# Patient Record
Sex: FEMALE | Race: OTHER | HISPANIC OR LATINO
[De-identification: names, ages, dates, MRNs, and addresses within clinical notes are randomized per-mention and may not be internally consistent; named-entity substitution may affect disease eponyms.]

---

## 2017-01-06 ENCOUNTER — APPOINTMENT (OUTPATIENT)
Dept: ORTHOPEDIC SURGERY | Facility: HOSPITAL | Age: 62
End: 2017-01-06

## 2017-01-23 ENCOUNTER — APPOINTMENT (OUTPATIENT)
Dept: ORTHOPEDIC SURGERY | Facility: CLINIC | Age: 62
End: 2017-01-23

## 2017-01-30 ENCOUNTER — LABORATORY RESULT (OUTPATIENT)
Age: 62
End: 2017-01-30

## 2017-01-30 ENCOUNTER — APPOINTMENT (OUTPATIENT)
Dept: RHEUMATOLOGY | Facility: CLINIC | Age: 62
End: 2017-01-30

## 2017-01-31 ENCOUNTER — APPOINTMENT (OUTPATIENT)
Dept: OPHTHALMOLOGY | Facility: CLINIC | Age: 62
End: 2017-01-31

## 2017-01-31 ENCOUNTER — OUTPATIENT (OUTPATIENT)
Dept: OUTPATIENT SERVICES | Facility: HOSPITAL | Age: 62
LOS: 1 days | End: 2017-01-31

## 2017-03-24 ENCOUNTER — APPOINTMENT (OUTPATIENT)
Dept: OPHTHALMOLOGY | Facility: CLINIC | Age: 62
End: 2017-03-24

## 2017-04-03 ENCOUNTER — APPOINTMENT (OUTPATIENT)
Dept: RHEUMATOLOGY | Facility: CLINIC | Age: 62
End: 2017-04-03

## 2017-04-06 ENCOUNTER — OUTPATIENT (OUTPATIENT)
Dept: OUTPATIENT SERVICES | Facility: HOSPITAL | Age: 62
LOS: 1 days | End: 2017-04-06

## 2017-04-07 ENCOUNTER — APPOINTMENT (OUTPATIENT)
Dept: OPHTHALMOLOGY | Facility: CLINIC | Age: 62
End: 2017-04-07

## 2017-04-13 ENCOUNTER — LABORATORY RESULT (OUTPATIENT)
Age: 62
End: 2017-04-13

## 2017-04-13 ENCOUNTER — APPOINTMENT (OUTPATIENT)
Dept: RHEUMATOLOGY | Facility: CLINIC | Age: 62
End: 2017-04-13

## 2017-05-08 ENCOUNTER — APPOINTMENT (OUTPATIENT)
Dept: OPHTHALMOLOGY | Facility: CLINIC | Age: 62
End: 2017-05-08

## 2017-05-18 ENCOUNTER — RX RENEWAL (OUTPATIENT)
Age: 62
End: 2017-05-18

## 2017-06-08 ENCOUNTER — APPOINTMENT (OUTPATIENT)
Dept: RHEUMATOLOGY | Facility: CLINIC | Age: 62
End: 2017-06-08

## 2017-06-08 ENCOUNTER — LABORATORY RESULT (OUTPATIENT)
Age: 62
End: 2017-06-08

## 2017-06-13 ENCOUNTER — LABORATORY RESULT (OUTPATIENT)
Age: 62
End: 2017-06-13

## 2017-07-18 DIAGNOSIS — H04.123 DRY EYE SYNDROME OF BILATERAL LACRIMAL GLANDS: ICD-10-CM

## 2017-08-03 ENCOUNTER — APPOINTMENT (OUTPATIENT)
Dept: RHEUMATOLOGY | Facility: CLINIC | Age: 62
End: 2017-08-03
Payer: MEDICAID

## 2017-08-03 PROCEDURE — 96415 CHEMO IV INFUSION ADDL HR: CPT

## 2017-08-03 PROCEDURE — 96375 TX/PRO/DX INJ NEW DRUG ADDON: CPT

## 2017-08-03 PROCEDURE — 96413 CHEMO IV INFUSION 1 HR: CPT

## 2017-08-22 ENCOUNTER — OUTPATIENT (OUTPATIENT)
Dept: OUTPATIENT SERVICES | Facility: HOSPITAL | Age: 62
LOS: 1 days | End: 2017-08-22
Payer: MEDICAID

## 2017-08-22 ENCOUNTER — APPOINTMENT (OUTPATIENT)
Dept: MAMMOGRAPHY | Facility: IMAGING CENTER | Age: 62
End: 2017-08-22
Payer: MEDICAID

## 2017-08-22 ENCOUNTER — APPOINTMENT (OUTPATIENT)
Dept: RADIOLOGY | Facility: IMAGING CENTER | Age: 62
End: 2017-08-22
Payer: MEDICAID

## 2017-08-22 DIAGNOSIS — Z00.8 ENCOUNTER FOR OTHER GENERAL EXAMINATION: ICD-10-CM

## 2017-08-22 PROCEDURE — 77063 BREAST TOMOSYNTHESIS BI: CPT | Mod: 26

## 2017-08-22 PROCEDURE — 77063 BREAST TOMOSYNTHESIS BI: CPT

## 2017-08-22 PROCEDURE — 77080 DXA BONE DENSITY AXIAL: CPT | Mod: 26

## 2017-08-22 PROCEDURE — G0202: CPT | Mod: 26

## 2017-08-22 PROCEDURE — 77067 SCR MAMMO BI INCL CAD: CPT

## 2017-08-22 PROCEDURE — 77080 DXA BONE DENSITY AXIAL: CPT

## 2017-09-20 ENCOUNTER — EMERGENCY (EMERGENCY)
Facility: HOSPITAL | Age: 62
LOS: 1 days | Discharge: HOME | End: 2017-09-20
Attending: EMERGENCY MEDICINE | Admitting: EMERGENCY MEDICINE
Payer: MEDICAID

## 2017-09-20 VITALS
OXYGEN SATURATION: 96 % | SYSTOLIC BLOOD PRESSURE: 131 MMHG | DIASTOLIC BLOOD PRESSURE: 87 MMHG | HEART RATE: 100 BPM | RESPIRATION RATE: 18 BRPM | WEIGHT: 151.9 LBS | TEMPERATURE: 99 F

## 2017-09-20 VITALS
TEMPERATURE: 98 F | HEART RATE: 98 BPM | DIASTOLIC BLOOD PRESSURE: 75 MMHG | RESPIRATION RATE: 18 BRPM | OXYGEN SATURATION: 98 % | SYSTOLIC BLOOD PRESSURE: 112 MMHG

## 2017-09-20 LAB
ALBUMIN SERPL ELPH-MCNC: 4.4 G/DL — SIGNIFICANT CHANGE UP (ref 3.3–5)
ALP SERPL-CCNC: 78 U/L — SIGNIFICANT CHANGE UP (ref 40–120)
ALT FLD-CCNC: 12 U/L RC — SIGNIFICANT CHANGE UP (ref 10–45)
ANION GAP SERPL CALC-SCNC: 15 MMOL/L — SIGNIFICANT CHANGE UP (ref 5–17)
APTT BLD: 28.7 SEC — SIGNIFICANT CHANGE UP (ref 27.5–37.4)
AST SERPL-CCNC: 18 U/L — SIGNIFICANT CHANGE UP (ref 10–40)
BASOPHILS # BLD AUTO: 0.1 K/UL — SIGNIFICANT CHANGE UP (ref 0–0.2)
BASOPHILS NFR BLD AUTO: 0.7 % — SIGNIFICANT CHANGE UP (ref 0–2)
BILIRUB SERPL-MCNC: 0.3 MG/DL — SIGNIFICANT CHANGE UP (ref 0.2–1.2)
BUN SERPL-MCNC: 14 MG/DL — SIGNIFICANT CHANGE UP (ref 7–23)
CALCIUM SERPL-MCNC: 10.2 MG/DL — SIGNIFICANT CHANGE UP (ref 8.4–10.5)
CHLORIDE SERPL-SCNC: 100 MMOL/L — SIGNIFICANT CHANGE UP (ref 96–108)
CO2 SERPL-SCNC: 26 MMOL/L — SIGNIFICANT CHANGE UP (ref 22–31)
CREAT SERPL-MCNC: 0.81 MG/DL — SIGNIFICANT CHANGE UP (ref 0.5–1.3)
EOSINOPHIL # BLD AUTO: 0.2 K/UL — SIGNIFICANT CHANGE UP (ref 0–0.5)
EOSINOPHIL NFR BLD AUTO: 3.3 % — SIGNIFICANT CHANGE UP (ref 0–6)
GAS PNL BLDV: SIGNIFICANT CHANGE UP
GLUCOSE SERPL-MCNC: 87 MG/DL — SIGNIFICANT CHANGE UP (ref 70–99)
HCT VFR BLD CALC: 42.3 % — SIGNIFICANT CHANGE UP (ref 34.5–45)
HGB BLD-MCNC: 14.2 G/DL — SIGNIFICANT CHANGE UP (ref 11.5–15.5)
INR BLD: 1.05 RATIO — SIGNIFICANT CHANGE UP (ref 0.88–1.16)
LYMPHOCYTES # BLD AUTO: 2.3 K/UL — SIGNIFICANT CHANGE UP (ref 1–3.3)
LYMPHOCYTES # BLD AUTO: 31.2 % — SIGNIFICANT CHANGE UP (ref 13–44)
MCHC RBC-ENTMCNC: 30.3 PG — SIGNIFICANT CHANGE UP (ref 27–34)
MCHC RBC-ENTMCNC: 33.5 GM/DL — SIGNIFICANT CHANGE UP (ref 32–36)
MCV RBC AUTO: 90.3 FL — SIGNIFICANT CHANGE UP (ref 80–100)
MONOCYTES # BLD AUTO: 0.4 K/UL — SIGNIFICANT CHANGE UP (ref 0–0.9)
MONOCYTES NFR BLD AUTO: 5.5 % — SIGNIFICANT CHANGE UP (ref 2–14)
NEUTROPHILS # BLD AUTO: 4.4 K/UL — SIGNIFICANT CHANGE UP (ref 1.8–7.4)
NEUTROPHILS NFR BLD AUTO: 59.2 % — SIGNIFICANT CHANGE UP (ref 43–77)
PLATELET # BLD AUTO: 245 K/UL — SIGNIFICANT CHANGE UP (ref 150–400)
POTASSIUM SERPL-MCNC: 3.8 MMOL/L — SIGNIFICANT CHANGE UP (ref 3.5–5.3)
POTASSIUM SERPL-SCNC: 3.8 MMOL/L — SIGNIFICANT CHANGE UP (ref 3.5–5.3)
PROT SERPL-MCNC: 8.7 G/DL — HIGH (ref 6–8.3)
PROTHROM AB SERPL-ACNC: 11.5 SEC — SIGNIFICANT CHANGE UP (ref 9.8–12.7)
RAPID RVP RESULT: SIGNIFICANT CHANGE UP
RBC # BLD: 4.69 M/UL — SIGNIFICANT CHANGE UP (ref 3.8–5.2)
RBC # FLD: 11.4 % — SIGNIFICANT CHANGE UP (ref 10.3–14.5)
SODIUM SERPL-SCNC: 141 MMOL/L — SIGNIFICANT CHANGE UP (ref 135–145)
WBC # BLD: 7.5 K/UL — SIGNIFICANT CHANGE UP (ref 3.8–10.5)
WBC # FLD AUTO: 7.5 K/UL — SIGNIFICANT CHANGE UP (ref 3.8–10.5)

## 2017-09-20 PROCEDURE — 82435 ASSAY OF BLOOD CHLORIDE: CPT

## 2017-09-20 PROCEDURE — 87633 RESP VIRUS 12-25 TARGETS: CPT

## 2017-09-20 PROCEDURE — 84295 ASSAY OF SERUM SODIUM: CPT

## 2017-09-20 PROCEDURE — 87798 DETECT AGENT NOS DNA AMP: CPT

## 2017-09-20 PROCEDURE — 99284 EMERGENCY DEPT VISIT MOD MDM: CPT | Mod: 25

## 2017-09-20 PROCEDURE — 87486 CHLMYD PNEUM DNA AMP PROBE: CPT

## 2017-09-20 PROCEDURE — 87581 M.PNEUMON DNA AMP PROBE: CPT

## 2017-09-20 PROCEDURE — 85027 COMPLETE CBC AUTOMATED: CPT

## 2017-09-20 PROCEDURE — 71046 X-RAY EXAM CHEST 2 VIEWS: CPT

## 2017-09-20 PROCEDURE — 83605 ASSAY OF LACTIC ACID: CPT

## 2017-09-20 PROCEDURE — 85610 PROTHROMBIN TIME: CPT

## 2017-09-20 PROCEDURE — 80053 COMPREHEN METABOLIC PANEL: CPT

## 2017-09-20 PROCEDURE — 71020: CPT | Mod: 26

## 2017-09-20 PROCEDURE — 82330 ASSAY OF CALCIUM: CPT

## 2017-09-20 PROCEDURE — 82947 ASSAY GLUCOSE BLOOD QUANT: CPT

## 2017-09-20 PROCEDURE — 99284 EMERGENCY DEPT VISIT MOD MDM: CPT

## 2017-09-20 PROCEDURE — 84132 ASSAY OF SERUM POTASSIUM: CPT

## 2017-09-20 PROCEDURE — 85730 THROMBOPLASTIN TIME PARTIAL: CPT

## 2017-09-20 PROCEDURE — 82803 BLOOD GASES ANY COMBINATION: CPT

## 2017-09-20 PROCEDURE — 85014 HEMATOCRIT: CPT

## 2017-09-20 RX ORDER — AZITHROMYCIN 500 MG/1
1 TABLET, FILM COATED ORAL
Qty: 1 | Refills: 0 | OUTPATIENT
Start: 2017-09-20

## 2017-09-20 RX ORDER — SODIUM CHLORIDE 9 MG/ML
1000 INJECTION INTRAMUSCULAR; INTRAVENOUS; SUBCUTANEOUS ONCE
Qty: 0 | Refills: 0 | Status: COMPLETED | OUTPATIENT
Start: 2017-09-20 | End: 2017-09-20

## 2017-09-20 RX ORDER — AZITHROMYCIN 500 MG/1
500 TABLET, FILM COATED ORAL ONCE
Qty: 0 | Refills: 0 | Status: COMPLETED | OUTPATIENT
Start: 2017-09-20 | End: 2017-09-20

## 2017-09-20 RX ADMIN — SODIUM CHLORIDE 1000 MILLILITER(S): 9 INJECTION INTRAMUSCULAR; INTRAVENOUS; SUBCUTANEOUS at 17:29

## 2017-09-20 RX ADMIN — AZITHROMYCIN 500 MILLIGRAM(S): 500 TABLET, FILM COATED ORAL at 19:31

## 2017-09-20 NOTE — ED PROVIDER NOTE - MEDICAL DECISION MAKING DETAILS
61 y/o F pmhx RA on immunosuppression therapy presenting with worsening cough x1mo with mucous production now subjective fevers, chills and myalgias. Sent in by PMD for infectious work-up. PE unremarkable. Will obtain labs, cxr, rvp and reassess. 63 y/o F pmhx RA on immunosuppression therapy presenting with worsening cough x1mo with mucous production now subjective fevers, chills and myalgias. Sent in by PMD for infectious work-up. PE unremarkable. Will obtain labs, cxr, rvp and IVF,  reassess.

## 2017-09-20 NOTE — ED ADULT NURSE NOTE - OBJECTIVE STATEMENT
63 y/o female PMH RA on methotrexate, prednisone, remicade presents to ED c/o cough x 1 month, worsening since Friday. Pt had fever and chills since Friday as well (did not take temp but felt hot). Pt c/o headache and left sided chest discomfort that radiates to back when she coughs. Pt saw primary care today who told her to come to ED for labs and chest x ray. Pt states she has green phlegm when she coughs. Denies n/v/d. Pt lungs clear b/l. Breathing even, unlabored. Skin warm, dry, intact. Gross motor and neuro intact. Ambulatory without assistance. Pt safety and comfort provided. 63 y/o female PMH RA on methotrexate, prednisone, remicade presents to ED c/o cough x 1 month, worsening since Friday. Pt had fever and chills since Friday as well (did not take temp but felt hot). Pt c/o headache and left sided chest discomfort that radiates to back when she coughs. Pt saw primary care today who told her to come to ED for labs and chest x ray. Pt states she has green phlegm when she coughs. Pt coughs upon deep inspiration and c/o MCGINNIS. Denies n/v/d. Pt lungs clear b/l. Breathing even. Skin warm, dry, intact. Gross motor and neuro intact. Ambulatory without assistance. Pt safety and comfort provided.

## 2017-09-20 NOTE — ED PROVIDER NOTE - ATTENDING CONTRIBUTION TO CARE
63 y/o F pmhx RA on immunosuppression therapy presenting with worsening cough x1mo with mucous production now subjective fevers, chills and myalgias. Sent in by PMD for infectious work-up. Nontoxic on exam, PE unremarkable. Will obtain labs, cxr, rvp and IVF,  reassess.

## 2017-09-20 NOTE — ED PROVIDER NOTE - PROGRESS NOTE DETAILS
xray negative for pneumonia. given patient's immunocompromised status will rx zpak and d/c with instructions on importance of follow-up. -Eli Stoner PA-C

## 2017-09-20 NOTE — ED PROVIDER NOTE - OBJECTIVE STATEMENT
63 y/o F pmhx RA on infliximab, methotrexate and prednisone, presenting to the ED for cough worsening for the last month. Pt states that her cough has been getting progressively more severe, now with mucous production for the last week. She states that she has been having intermittent chills for the last 2 days and last night felt very warm. Did not check her temperature. Reports that she went to her PMD this morning as she has been having body aches along with these chills/feeling warm and was concerned. She reports that her PMD sent her in because he is concerned that she is immunocompromised secondary to her RA and the associated treatments. She states she has some shortness of breath associated with her cough, but denies any chest pain, nausea, vomiting, recent travel.

## 2017-09-20 NOTE — ED PROVIDER NOTE - CARE PLAN
Principal Discharge DX:	Bronchitis  Secondary Diagnosis:	Rheumatoid arthritis Principal Discharge DX:	Bronchitis  Instructions for follow-up, activity and diet:	1. Take Z-arianna as directed to completion   2. Take over the counter cough medicine as needed for cough   3. Follow-up with your primary physician this week for reevaluation   4. Return to the ER for any new or worsening symptoms.  Secondary Diagnosis:	Rheumatoid arthritis

## 2017-09-20 NOTE — ED PROVIDER NOTE - PLAN OF CARE
1. Take Z-arianna as directed to completion   2. Take over the counter cough medicine as needed for cough   3. Follow-up with your primary physician this week for reevaluation   4. Return to the ER for any new or worsening symptoms.

## 2017-09-25 ENCOUNTER — APPOINTMENT (OUTPATIENT)
Dept: RHEUMATOLOGY | Facility: CLINIC | Age: 62
End: 2017-09-25

## 2017-09-26 ENCOUNTER — APPOINTMENT (OUTPATIENT)
Dept: RADIOLOGY | Facility: CLINIC | Age: 62
End: 2017-09-26
Payer: MEDICAID

## 2017-09-26 ENCOUNTER — OUTPATIENT (OUTPATIENT)
Dept: OUTPATIENT SERVICES | Facility: HOSPITAL | Age: 62
LOS: 1 days | End: 2017-09-26
Payer: MEDICAID

## 2017-09-26 DIAGNOSIS — H04.123 DRY EYE SYNDROME OF BILATERAL LACRIMAL GLANDS: ICD-10-CM

## 2017-09-26 DIAGNOSIS — M25.561 PAIN IN RIGHT KNEE: ICD-10-CM

## 2017-09-26 PROCEDURE — 73522 X-RAY EXAM HIPS BI 3-4 VIEWS: CPT | Mod: 26

## 2017-09-26 PROCEDURE — 73564 X-RAY EXAM KNEE 4 OR MORE: CPT

## 2017-09-26 PROCEDURE — 73564 X-RAY EXAM KNEE 4 OR MORE: CPT | Mod: 26,50

## 2017-09-26 PROCEDURE — 73522 X-RAY EXAM HIPS BI 3-4 VIEWS: CPT

## 2017-10-02 ENCOUNTER — APPOINTMENT (OUTPATIENT)
Dept: RHEUMATOLOGY | Facility: CLINIC | Age: 62
End: 2017-10-02
Payer: MEDICAID

## 2017-10-02 VITALS
SYSTOLIC BLOOD PRESSURE: 132 MMHG | HEART RATE: 73 BPM | BODY MASS INDEX: 27.19 KG/M2 | WEIGHT: 144 LBS | DIASTOLIC BLOOD PRESSURE: 84 MMHG | OXYGEN SATURATION: 98 % | TEMPERATURE: 98.2 F | HEIGHT: 61 IN

## 2017-10-02 DIAGNOSIS — Z79.899 OTHER LONG TERM (CURRENT) DRUG THERAPY: ICD-10-CM

## 2017-10-02 PROCEDURE — 99214 OFFICE O/P EST MOD 30 MIN: CPT

## 2017-10-06 ENCOUNTER — LABORATORY RESULT (OUTPATIENT)
Age: 62
End: 2017-10-06

## 2017-10-06 ENCOUNTER — APPOINTMENT (OUTPATIENT)
Dept: RHEUMATOLOGY | Facility: CLINIC | Age: 62
End: 2017-10-06
Payer: MEDICAID

## 2017-10-06 PROCEDURE — 96413 CHEMO IV INFUSION 1 HR: CPT

## 2017-10-06 PROCEDURE — 96415 CHEMO IV INFUSION ADDL HR: CPT

## 2017-10-06 PROCEDURE — 36415 COLL VENOUS BLD VENIPUNCTURE: CPT

## 2017-12-01 ENCOUNTER — LABORATORY RESULT (OUTPATIENT)
Age: 62
End: 2017-12-01

## 2017-12-01 ENCOUNTER — APPOINTMENT (OUTPATIENT)
Dept: RHEUMATOLOGY | Facility: CLINIC | Age: 62
End: 2017-12-01
Payer: MEDICAID

## 2017-12-01 PROCEDURE — 96415 CHEMO IV INFUSION ADDL HR: CPT

## 2017-12-01 PROCEDURE — 96413 CHEMO IV INFUSION 1 HR: CPT

## 2017-12-01 PROCEDURE — 96375 TX/PRO/DX INJ NEW DRUG ADDON: CPT

## 2018-01-17 ENCOUNTER — APPOINTMENT (OUTPATIENT)
Dept: RHEUMATOLOGY | Facility: CLINIC | Age: 63
End: 2018-01-17

## 2018-01-24 ENCOUNTER — LABORATORY RESULT (OUTPATIENT)
Age: 63
End: 2018-01-24

## 2018-01-24 ENCOUNTER — APPOINTMENT (OUTPATIENT)
Dept: RHEUMATOLOGY | Facility: CLINIC | Age: 63
End: 2018-01-24
Payer: MEDICAID

## 2018-01-24 PROCEDURE — 96415 CHEMO IV INFUSION ADDL HR: CPT

## 2018-01-24 PROCEDURE — 96375 TX/PRO/DX INJ NEW DRUG ADDON: CPT

## 2018-01-24 PROCEDURE — 96413 CHEMO IV INFUSION 1 HR: CPT

## 2018-01-24 PROCEDURE — 36415 COLL VENOUS BLD VENIPUNCTURE: CPT

## 2018-03-01 ENCOUNTER — APPOINTMENT (OUTPATIENT)
Dept: RHEUMATOLOGY | Facility: CLINIC | Age: 63
End: 2018-03-01
Payer: MEDICAID

## 2018-03-01 ENCOUNTER — LABORATORY RESULT (OUTPATIENT)
Age: 63
End: 2018-03-01

## 2018-03-01 PROCEDURE — 36415 COLL VENOUS BLD VENIPUNCTURE: CPT

## 2018-03-01 PROCEDURE — 96413 CHEMO IV INFUSION 1 HR: CPT

## 2018-03-28 ENCOUNTER — LABORATORY RESULT (OUTPATIENT)
Age: 63
End: 2018-03-28

## 2018-03-28 ENCOUNTER — APPOINTMENT (OUTPATIENT)
Dept: RADIOLOGY | Facility: CLINIC | Age: 63
End: 2018-03-28
Payer: MEDICAID

## 2018-03-28 ENCOUNTER — OUTPATIENT (OUTPATIENT)
Dept: OUTPATIENT SERVICES | Facility: HOSPITAL | Age: 63
LOS: 1 days | End: 2018-03-28
Payer: MEDICAID

## 2018-03-28 ENCOUNTER — APPOINTMENT (OUTPATIENT)
Dept: RHEUMATOLOGY | Facility: CLINIC | Age: 63
End: 2018-03-28
Payer: MEDICAID

## 2018-03-28 DIAGNOSIS — M06.9 RHEUMATOID ARTHRITIS, UNSPECIFIED: ICD-10-CM

## 2018-03-28 PROCEDURE — 71046 X-RAY EXAM CHEST 2 VIEWS: CPT | Mod: 26

## 2018-03-28 PROCEDURE — 71046 X-RAY EXAM CHEST 2 VIEWS: CPT

## 2018-03-28 PROCEDURE — 36415 COLL VENOUS BLD VENIPUNCTURE: CPT

## 2018-03-28 PROCEDURE — 96413 CHEMO IV INFUSION 1 HR: CPT

## 2018-05-23 ENCOUNTER — APPOINTMENT (OUTPATIENT)
Dept: RHEUMATOLOGY | Facility: CLINIC | Age: 63
End: 2018-05-23

## 2018-06-01 ENCOUNTER — OUTPATIENT (OUTPATIENT)
Dept: OUTPATIENT SERVICES | Facility: HOSPITAL | Age: 63
LOS: 1 days | End: 2018-06-01
Payer: MEDICAID

## 2018-06-01 PROCEDURE — G9001: CPT

## 2018-06-07 DIAGNOSIS — R69 ILLNESS, UNSPECIFIED: ICD-10-CM

## 2018-06-12 ENCOUNTER — FORM ENCOUNTER (OUTPATIENT)
Age: 63
End: 2018-06-12

## 2018-06-13 ENCOUNTER — APPOINTMENT (OUTPATIENT)
Dept: RADIOLOGY | Facility: CLINIC | Age: 63
End: 2018-06-13
Payer: MEDICAID

## 2018-06-13 ENCOUNTER — APPOINTMENT (OUTPATIENT)
Dept: RHEUMATOLOGY | Facility: CLINIC | Age: 63
End: 2018-06-13
Payer: MEDICAID

## 2018-06-13 ENCOUNTER — OUTPATIENT (OUTPATIENT)
Dept: OUTPATIENT SERVICES | Facility: HOSPITAL | Age: 63
LOS: 1 days | End: 2018-06-13
Payer: MEDICAID

## 2018-06-13 DIAGNOSIS — Z00.8 ENCOUNTER FOR OTHER GENERAL EXAMINATION: ICD-10-CM

## 2018-06-13 PROCEDURE — 71046 X-RAY EXAM CHEST 2 VIEWS: CPT | Mod: 26

## 2018-06-13 PROCEDURE — 73110 X-RAY EXAM OF WRIST: CPT

## 2018-06-13 PROCEDURE — 73610 X-RAY EXAM OF ANKLE: CPT | Mod: 26,LT,RT

## 2018-06-13 PROCEDURE — 71046 X-RAY EXAM CHEST 2 VIEWS: CPT

## 2018-06-13 PROCEDURE — 73630 X-RAY EXAM OF FOOT: CPT

## 2018-06-13 PROCEDURE — 73110 X-RAY EXAM OF WRIST: CPT | Mod: 26,LT,RT

## 2018-06-13 PROCEDURE — 73630 X-RAY EXAM OF FOOT: CPT | Mod: 26,LT,RT

## 2018-06-13 PROCEDURE — 99215 OFFICE O/P EST HI 40 MIN: CPT | Mod: 25

## 2018-06-13 PROCEDURE — 73130 X-RAY EXAM OF HAND: CPT | Mod: 26,LT,RT

## 2018-06-13 PROCEDURE — 73610 X-RAY EXAM OF ANKLE: CPT

## 2018-06-13 PROCEDURE — 73130 X-RAY EXAM OF HAND: CPT

## 2018-06-13 PROCEDURE — 96372 THER/PROPH/DIAG INJ SC/IM: CPT

## 2018-06-13 RX ORDER — METHYLPRED ACET/NACL,ISO-OS/PF 40 MG/ML
40 VIAL (ML) INJECTION
Qty: 1 | Refills: 0 | Status: COMPLETED | OUTPATIENT
Start: 2018-06-13

## 2018-06-13 RX ORDER — AMPICILLIN TRIHYDRATE 500 MG
25 MCG CAPSULE ORAL DAILY
Qty: 90 | Refills: 3 | Status: DISCONTINUED | COMMUNITY
Start: 2017-05-18 | End: 2018-06-13

## 2018-06-13 RX ORDER — PRAVASTATIN SODIUM 20 MG/1
20 TABLET ORAL
Qty: 30 | Refills: 0 | Status: COMPLETED | COMMUNITY
Start: 2018-02-27

## 2018-06-13 RX ORDER — CELECOXIB 200 MG/1
200 CAPSULE ORAL
Qty: 30 | Refills: 0 | Status: COMPLETED | COMMUNITY
Start: 2018-03-15

## 2018-06-13 RX ADMIN — METHYLPREDNISOLONE ACETATE 0 MG/ML: 40 INJECTION, SUSPENSION INTRA-ARTICULAR; INTRALESIONAL; INTRAMUSCULAR; SOFT TISSUE at 00:00

## 2018-06-18 LAB
25(OH)D3 SERPL-MCNC: 45.9 NG/ML
ADJUSTED MITOGEN: 0.76 IU/ML
ADJUSTED TB AG: 0.02 IU/ML
ALBUMIN SERPL ELPH-MCNC: 4 G/DL
ALP BLD-CCNC: 67 U/L
ALT SERPL-CCNC: 10 U/L
ANION GAP SERPL CALC-SCNC: 17 MMOL/L
AST SERPL-CCNC: 15 U/L
BASOPHILS # BLD AUTO: 0.03 K/UL
BASOPHILS NFR BLD AUTO: 0.4 %
BILIRUB SERPL-MCNC: 0.3 MG/DL
BUN SERPL-MCNC: 13 MG/DL
CALCIUM SERPL-MCNC: 9.7 MG/DL
CHLORIDE SERPL-SCNC: 101 MMOL/L
CO2 SERPL-SCNC: 24 MMOL/L
CREAT SERPL-MCNC: 0.71 MG/DL
CRP SERPL-MCNC: 1.3 MG/DL
EOSINOPHIL # BLD AUTO: 0.22 K/UL
EOSINOPHIL NFR BLD AUTO: 2.8 %
ERYTHROCYTE [SEDIMENTATION RATE] IN BLOOD BY WESTERGREN METHOD: 63 MM/HR
GLUCOSE SERPL-MCNC: 96 MG/DL
HAV IGM SER QL: NONREACTIVE
HBV CORE IGG+IGM SER QL: NONREACTIVE
HBV CORE IGM SER QL: NONREACTIVE
HBV SURFACE AG SER QL: NONREACTIVE
HCT VFR BLD CALC: 39.7 %
HCV AB SER QL: NONREACTIVE
HCV S/CO RATIO: 0.18 S/CO
HGB BLD-MCNC: 13.3 G/DL
IMM GRANULOCYTES NFR BLD AUTO: 0.3 %
LYMPHOCYTES # BLD AUTO: 1.85 K/UL
LYMPHOCYTES NFR BLD AUTO: 23.5 %
M TB IFN-G BLD-IMP: NEGATIVE
MAN DIFF?: NORMAL
MCHC RBC-ENTMCNC: 28.7 PG
MCHC RBC-ENTMCNC: 33.5 GM/DL
MCV RBC AUTO: 85.6 FL
MONOCYTES # BLD AUTO: 0.62 K/UL
MONOCYTES NFR BLD AUTO: 7.9 %
NEUTROPHILS # BLD AUTO: 5.13 K/UL
NEUTROPHILS NFR BLD AUTO: 65.1 %
PLATELET # BLD AUTO: 281 K/UL
POTASSIUM SERPL-SCNC: 4.1 MMOL/L
PROT SERPL-MCNC: 7.9 G/DL
QUANTIFERON GOLD NIL: 0.01 IU/ML
RBC # BLD: 4.64 M/UL
RBC # FLD: 13.2 %
SODIUM SERPL-SCNC: 142 MMOL/L
WBC # FLD AUTO: 7.87 K/UL

## 2018-07-01 ENCOUNTER — OUTPATIENT (OUTPATIENT)
Dept: OUTPATIENT SERVICES | Facility: HOSPITAL | Age: 63
LOS: 1 days | End: 2018-07-01

## 2018-07-06 ENCOUNTER — CLINICAL ADVICE (OUTPATIENT)
Age: 63
End: 2018-07-06

## 2018-07-18 DIAGNOSIS — Z71.89 OTHER SPECIFIED COUNSELING: ICD-10-CM

## 2018-07-27 ENCOUNTER — APPOINTMENT (OUTPATIENT)
Dept: RHEUMATOLOGY | Facility: CLINIC | Age: 63
End: 2018-07-27
Payer: MEDICAID

## 2018-07-27 VITALS
BODY MASS INDEX: 25.49 KG/M2 | WEIGHT: 135 LBS | TEMPERATURE: 98 F | OXYGEN SATURATION: 98 % | RESPIRATION RATE: 16 BRPM | HEIGHT: 61 IN | DIASTOLIC BLOOD PRESSURE: 76 MMHG | HEART RATE: 70 BPM | SYSTOLIC BLOOD PRESSURE: 107 MMHG

## 2018-07-27 PROCEDURE — 99214 OFFICE O/P EST MOD 30 MIN: CPT

## 2018-08-05 ENCOUNTER — FORM ENCOUNTER (OUTPATIENT)
Age: 63
End: 2018-08-05

## 2018-08-06 ENCOUNTER — OUTPATIENT (OUTPATIENT)
Dept: OUTPATIENT SERVICES | Facility: HOSPITAL | Age: 63
LOS: 1 days | End: 2018-08-06
Payer: MEDICAID

## 2018-08-06 ENCOUNTER — APPOINTMENT (OUTPATIENT)
Dept: ULTRASOUND IMAGING | Facility: IMAGING CENTER | Age: 63
End: 2018-08-06
Payer: MEDICAID

## 2018-08-06 DIAGNOSIS — M85.80 OTHER SPECIFIED DISORDERS OF BONE DENSITY AND STRUCTURE, UNSPECIFIED SITE: ICD-10-CM

## 2018-08-06 DIAGNOSIS — M06.30 RHEUMATOID NODULE, UNSPECIFIED SITE: ICD-10-CM

## 2018-08-06 DIAGNOSIS — M06.9 RHEUMATOID ARTHRITIS, UNSPECIFIED: ICD-10-CM

## 2018-08-06 DIAGNOSIS — R13.10 DYSPHAGIA, UNSPECIFIED: ICD-10-CM

## 2018-08-06 LAB
ALBUMIN SERPL ELPH-MCNC: 4 G/DL
ALP BLD-CCNC: 62 U/L
ALT SERPL-CCNC: 7 U/L
ANION GAP SERPL CALC-SCNC: 16 MMOL/L
AST SERPL-CCNC: 13 U/L
BASOPHILS # BLD AUTO: 0.03 K/UL
BASOPHILS NFR BLD AUTO: 0.4 %
BILIRUB SERPL-MCNC: 0.2 MG/DL
BUN SERPL-MCNC: 12 MG/DL
CALCIUM SERPL-MCNC: 9.3 MG/DL
CHLORIDE SERPL-SCNC: 103 MMOL/L
CO2 SERPL-SCNC: 24 MMOL/L
CREAT SERPL-MCNC: 0.76 MG/DL
EOSINOPHIL # BLD AUTO: 0.27 K/UL
EOSINOPHIL NFR BLD AUTO: 3.3 %
ERYTHROCYTE [SEDIMENTATION RATE] IN BLOOD BY WESTERGREN METHOD: 44 MM/HR
GLUCOSE SERPL-MCNC: 106 MG/DL
HCT VFR BLD CALC: 39 %
HGB BLD-MCNC: 12.6 G/DL
IMM GRANULOCYTES NFR BLD AUTO: 0.2 %
LYMPHOCYTES # BLD AUTO: 2.37 K/UL
LYMPHOCYTES NFR BLD AUTO: 28.8 %
MAN DIFF?: NORMAL
MCHC RBC-ENTMCNC: 28.4 PG
MCHC RBC-ENTMCNC: 32.3 GM/DL
MCV RBC AUTO: 87.8 FL
MONOCYTES # BLD AUTO: 0.71 K/UL
MONOCYTES NFR BLD AUTO: 8.6 %
NEUTROPHILS # BLD AUTO: 4.82 K/UL
NEUTROPHILS NFR BLD AUTO: 58.7 %
PLATELET # BLD AUTO: 306 K/UL
POTASSIUM SERPL-SCNC: 3.8 MMOL/L
PROT SERPL-MCNC: 7.4 G/DL
RBC # BLD: 4.44 M/UL
RBC # FLD: 13.6 %
SODIUM SERPL-SCNC: 143 MMOL/L
WBC # FLD AUTO: 8.22 K/UL

## 2018-08-06 PROCEDURE — 76536 US EXAM OF HEAD AND NECK: CPT | Mod: 26

## 2018-08-06 PROCEDURE — 76536 US EXAM OF HEAD AND NECK: CPT

## 2018-08-07 ENCOUNTER — FORM ENCOUNTER (OUTPATIENT)
Age: 63
End: 2018-08-07

## 2018-08-08 ENCOUNTER — OUTPATIENT (OUTPATIENT)
Dept: OUTPATIENT SERVICES | Facility: HOSPITAL | Age: 63
LOS: 1 days | End: 2018-08-08
Payer: MEDICAID

## 2018-08-08 ENCOUNTER — APPOINTMENT (OUTPATIENT)
Dept: MRI IMAGING | Facility: IMAGING CENTER | Age: 63
End: 2018-08-08
Payer: MEDICAID

## 2018-08-08 DIAGNOSIS — M85.80 OTHER SPECIFIED DISORDERS OF BONE DENSITY AND STRUCTURE, UNSPECIFIED SITE: ICD-10-CM

## 2018-08-08 DIAGNOSIS — R13.10 DYSPHAGIA, UNSPECIFIED: ICD-10-CM

## 2018-08-08 PROCEDURE — 72141 MRI NECK SPINE W/O DYE: CPT

## 2018-08-08 PROCEDURE — 72141 MRI NECK SPINE W/O DYE: CPT | Mod: 26

## 2018-08-23 ENCOUNTER — OUTPATIENT (OUTPATIENT)
Dept: OUTPATIENT SERVICES | Facility: HOSPITAL | Age: 63
LOS: 1 days | End: 2018-08-23
Payer: MEDICAID

## 2018-08-23 ENCOUNTER — APPOINTMENT (OUTPATIENT)
Dept: MAMMOGRAPHY | Facility: IMAGING CENTER | Age: 63
End: 2018-08-23
Payer: MEDICAID

## 2018-08-23 ENCOUNTER — APPOINTMENT (OUTPATIENT)
Dept: RADIOLOGY | Facility: IMAGING CENTER | Age: 63
End: 2018-08-23
Payer: MEDICAID

## 2018-08-23 DIAGNOSIS — Z00.8 ENCOUNTER FOR OTHER GENERAL EXAMINATION: ICD-10-CM

## 2018-08-23 PROCEDURE — 77067 SCR MAMMO BI INCL CAD: CPT | Mod: 26

## 2018-08-23 PROCEDURE — 77063 BREAST TOMOSYNTHESIS BI: CPT | Mod: 26

## 2018-08-23 PROCEDURE — 77080 DXA BONE DENSITY AXIAL: CPT | Mod: 26

## 2018-08-23 PROCEDURE — 77063 BREAST TOMOSYNTHESIS BI: CPT

## 2018-08-23 PROCEDURE — 77067 SCR MAMMO BI INCL CAD: CPT

## 2018-08-23 PROCEDURE — 77080 DXA BONE DENSITY AXIAL: CPT

## 2018-09-04 ENCOUNTER — APPOINTMENT (OUTPATIENT)
Dept: GASTROENTEROLOGY | Facility: CLINIC | Age: 63
End: 2018-09-04
Payer: MEDICAID

## 2018-09-04 VITALS
TEMPERATURE: 98.4 F | SYSTOLIC BLOOD PRESSURE: 116 MMHG | HEIGHT: 61 IN | DIASTOLIC BLOOD PRESSURE: 70 MMHG | BODY MASS INDEX: 27 KG/M2 | WEIGHT: 143 LBS

## 2018-09-04 PROCEDURE — 99214 OFFICE O/P EST MOD 30 MIN: CPT

## 2018-09-25 ENCOUNTER — APPOINTMENT (OUTPATIENT)
Dept: RHEUMATOLOGY | Facility: CLINIC | Age: 63
End: 2018-09-25
Payer: MEDICAID

## 2018-09-25 VITALS
SYSTOLIC BLOOD PRESSURE: 114 MMHG | TEMPERATURE: 98.1 F | BODY MASS INDEX: 27 KG/M2 | DIASTOLIC BLOOD PRESSURE: 72 MMHG | WEIGHT: 143 LBS | HEART RATE: 74 BPM | RESPIRATION RATE: 16 BRPM | HEIGHT: 61 IN | OXYGEN SATURATION: 98 %

## 2018-09-25 DIAGNOSIS — S62.366A NONDISPLACED FRACTURE OF NECK OF FIFTH METACARPAL BONE, RIGHT HAND, INITIAL ENCOUNTER FOR CLOSED FRACTURE: ICD-10-CM

## 2018-09-25 PROCEDURE — 99215 OFFICE O/P EST HI 40 MIN: CPT

## 2018-09-26 LAB
25(OH)D3 SERPL-MCNC: 27.3 NG/ML
ALBUMIN SERPL ELPH-MCNC: 3.9 G/DL
ALP BLD-CCNC: 62 U/L
ALT SERPL-CCNC: 11 U/L
ANION GAP SERPL CALC-SCNC: 13 MMOL/L
AST SERPL-CCNC: 16 U/L
BASOPHILS # BLD AUTO: 0.02 K/UL
BASOPHILS NFR BLD AUTO: 0.2 %
BILIRUB SERPL-MCNC: 0.2 MG/DL
BUN SERPL-MCNC: 10 MG/DL
CALCIUM SERPL-MCNC: 9 MG/DL
CHLORIDE SERPL-SCNC: 103 MMOL/L
CO2 SERPL-SCNC: 26 MMOL/L
CREAT SERPL-MCNC: 0.73 MG/DL
CRP SERPL-MCNC: 1.5 MG/DL
EOSINOPHIL # BLD AUTO: 0.28 K/UL
EOSINOPHIL NFR BLD AUTO: 3.4 %
ERYTHROCYTE [SEDIMENTATION RATE] IN BLOOD BY WESTERGREN METHOD: 48 MM/HR
GLUCOSE SERPL-MCNC: 81 MG/DL
HCT VFR BLD CALC: 38.6 %
HGB BLD-MCNC: 12.7 G/DL
IMM GRANULOCYTES NFR BLD AUTO: 0.2 %
LYMPHOCYTES # BLD AUTO: 1.17 K/UL
LYMPHOCYTES NFR BLD AUTO: 14.4 %
MAN DIFF?: NORMAL
MCHC RBC-ENTMCNC: 29.3 PG
MCHC RBC-ENTMCNC: 32.9 GM/DL
MCV RBC AUTO: 89.1 FL
MONOCYTES # BLD AUTO: 0.75 K/UL
MONOCYTES NFR BLD AUTO: 9.2 %
NEUTROPHILS # BLD AUTO: 5.9 K/UL
NEUTROPHILS NFR BLD AUTO: 72.6 %
PLATELET # BLD AUTO: 218 K/UL
POTASSIUM SERPL-SCNC: 4.3 MMOL/L
PROT SERPL-MCNC: 7.3 G/DL
RBC # BLD: 4.33 M/UL
RBC # FLD: 14.8 %
SODIUM SERPL-SCNC: 142 MMOL/L
WBC # FLD AUTO: 8.14 K/UL

## 2018-10-02 ENCOUNTER — LABORATORY RESULT (OUTPATIENT)
Age: 63
End: 2018-10-02

## 2018-10-02 ENCOUNTER — APPOINTMENT (OUTPATIENT)
Dept: GASTROENTEROLOGY | Facility: CLINIC | Age: 63
End: 2018-10-02
Payer: MEDICAID

## 2018-10-02 PROCEDURE — 45380 COLONOSCOPY AND BIOPSY: CPT

## 2018-10-02 PROCEDURE — 43239 EGD BIOPSY SINGLE/MULTIPLE: CPT

## 2018-11-01 ENCOUNTER — OUTPATIENT (OUTPATIENT)
Dept: OUTPATIENT SERVICES | Facility: HOSPITAL | Age: 63
LOS: 1 days | End: 2018-11-01
Payer: MEDICAID

## 2018-11-26 ENCOUNTER — EMERGENCY (EMERGENCY)
Facility: HOSPITAL | Age: 63
LOS: 1 days | End: 2018-11-26
Attending: EMERGENCY MEDICINE
Payer: MEDICAID

## 2018-11-26 ENCOUNTER — APPOINTMENT (OUTPATIENT)
Dept: RHEUMATOLOGY | Facility: CLINIC | Age: 63
End: 2018-11-26
Payer: MEDICAID

## 2018-11-26 VITALS
WEIGHT: 141.98 LBS | DIASTOLIC BLOOD PRESSURE: 61 MMHG | TEMPERATURE: 98 F | HEIGHT: 64 IN | OXYGEN SATURATION: 97 % | HEART RATE: 75 BPM | SYSTOLIC BLOOD PRESSURE: 147 MMHG | RESPIRATION RATE: 18 BRPM

## 2018-11-26 VITALS
OXYGEN SATURATION: 98 % | TEMPERATURE: 98.4 F | RESPIRATION RATE: 16 BRPM | DIASTOLIC BLOOD PRESSURE: 79 MMHG | HEIGHT: 61 IN | BODY MASS INDEX: 26.81 KG/M2 | WEIGHT: 142 LBS | SYSTOLIC BLOOD PRESSURE: 120 MMHG | HEART RATE: 70 BPM

## 2018-11-26 LAB
ALBUMIN SERPL ELPH-MCNC: 4.1 G/DL — SIGNIFICANT CHANGE UP (ref 3.3–5)
ALP SERPL-CCNC: 63 U/L — SIGNIFICANT CHANGE UP (ref 40–120)
ALT FLD-CCNC: 9 U/L — LOW (ref 10–45)
ANION GAP SERPL CALC-SCNC: 13 MMOL/L — SIGNIFICANT CHANGE UP (ref 5–17)
APTT BLD: 28.6 SEC — SIGNIFICANT CHANGE UP (ref 27.5–36.3)
AST SERPL-CCNC: 17 U/L — SIGNIFICANT CHANGE UP (ref 10–40)
BASOPHILS # BLD AUTO: 0 K/UL — SIGNIFICANT CHANGE UP (ref 0–0.2)
BASOPHILS NFR BLD AUTO: 0.5 % — SIGNIFICANT CHANGE UP (ref 0–2)
BILIRUB SERPL-MCNC: 0.2 MG/DL — SIGNIFICANT CHANGE UP (ref 0.2–1.2)
BUN SERPL-MCNC: 15 MG/DL — SIGNIFICANT CHANGE UP (ref 7–23)
CALCIUM SERPL-MCNC: 9.6 MG/DL — SIGNIFICANT CHANGE UP (ref 8.4–10.5)
CHLORIDE SERPL-SCNC: 102 MMOL/L — SIGNIFICANT CHANGE UP (ref 96–108)
CO2 SERPL-SCNC: 26 MMOL/L — SIGNIFICANT CHANGE UP (ref 22–31)
CREAT SERPL-MCNC: 0.79 MG/DL — SIGNIFICANT CHANGE UP (ref 0.5–1.3)
EOSINOPHIL # BLD AUTO: 0.2 K/UL — SIGNIFICANT CHANGE UP (ref 0–0.5)
EOSINOPHIL NFR BLD AUTO: 2.9 % — SIGNIFICANT CHANGE UP (ref 0–6)
GLUCOSE SERPL-MCNC: 92 MG/DL — SIGNIFICANT CHANGE UP (ref 70–99)
HCT VFR BLD CALC: 37.4 % — SIGNIFICANT CHANGE UP (ref 34.5–45)
HGB BLD-MCNC: 12.5 G/DL — SIGNIFICANT CHANGE UP (ref 11.5–15.5)
INR BLD: 1.03 RATIO — SIGNIFICANT CHANGE UP (ref 0.88–1.16)
LYMPHOCYTES # BLD AUTO: 2.6 K/UL — SIGNIFICANT CHANGE UP (ref 1–3.3)
LYMPHOCYTES # BLD AUTO: 35.5 % — SIGNIFICANT CHANGE UP (ref 13–44)
MCHC RBC-ENTMCNC: 28.8 PG — SIGNIFICANT CHANGE UP (ref 27–34)
MCHC RBC-ENTMCNC: 33.3 GM/DL — SIGNIFICANT CHANGE UP (ref 32–36)
MCV RBC AUTO: 86.5 FL — SIGNIFICANT CHANGE UP (ref 80–100)
MONOCYTES # BLD AUTO: 0.5 K/UL — SIGNIFICANT CHANGE UP (ref 0–0.9)
MONOCYTES NFR BLD AUTO: 6.8 % — SIGNIFICANT CHANGE UP (ref 2–14)
NEUTROPHILS # BLD AUTO: 3.9 K/UL — SIGNIFICANT CHANGE UP (ref 1.8–7.4)
NEUTROPHILS NFR BLD AUTO: 54.3 % — SIGNIFICANT CHANGE UP (ref 43–77)
PLATELET # BLD AUTO: 242 K/UL — SIGNIFICANT CHANGE UP (ref 150–400)
POTASSIUM SERPL-MCNC: 3.6 MMOL/L — SIGNIFICANT CHANGE UP (ref 3.5–5.3)
POTASSIUM SERPL-SCNC: 3.6 MMOL/L — SIGNIFICANT CHANGE UP (ref 3.5–5.3)
PROT SERPL-MCNC: 7.6 G/DL — SIGNIFICANT CHANGE UP (ref 6–8.3)
PROTHROM AB SERPL-ACNC: 11.8 SEC — SIGNIFICANT CHANGE UP (ref 10–12.9)
RBC # BLD: 4.33 M/UL — SIGNIFICANT CHANGE UP (ref 3.8–5.2)
RBC # FLD: 12.7 % — SIGNIFICANT CHANGE UP (ref 10.3–14.5)
SODIUM SERPL-SCNC: 141 MMOL/L — SIGNIFICANT CHANGE UP (ref 135–145)
TROPONIN T, HIGH SENSITIVITY RESULT: <6 NG/L — SIGNIFICANT CHANGE UP (ref 0–51)
TROPONIN T, HIGH SENSITIVITY RESULT: <6 NG/L — SIGNIFICANT CHANGE UP (ref 0–51)
WBC # BLD: 7.2 K/UL — SIGNIFICANT CHANGE UP (ref 3.8–10.5)
WBC # FLD AUTO: 7.2 K/UL — SIGNIFICANT CHANGE UP (ref 3.8–10.5)

## 2018-11-26 PROCEDURE — 99218: CPT

## 2018-11-26 PROCEDURE — 99215 OFFICE O/P EST HI 40 MIN: CPT

## 2018-11-26 PROCEDURE — 71046 X-RAY EXAM CHEST 2 VIEWS: CPT | Mod: 26

## 2018-11-26 PROCEDURE — 93010 ELECTROCARDIOGRAM REPORT: CPT

## 2018-11-26 RX ORDER — AZITHROMYCIN 250 MG/1
250 TABLET, FILM COATED ORAL
Qty: 1 | Refills: 0 | Status: DISCONTINUED | COMMUNITY
Start: 2018-09-25 | End: 2018-11-26

## 2018-11-26 RX ORDER — ASPIRIN/CALCIUM CARB/MAGNESIUM 324 MG
325 TABLET ORAL DAILY
Qty: 0 | Refills: 0 | Status: DISCONTINUED | OUTPATIENT
Start: 2018-11-26 | End: 2018-11-27

## 2018-11-26 RX ORDER — PANTOPRAZOLE SODIUM 20 MG/1
40 TABLET, DELAYED RELEASE ORAL
Qty: 0 | Refills: 0 | Status: DISCONTINUED | OUTPATIENT
Start: 2018-11-26 | End: 2018-11-27

## 2018-11-26 RX ORDER — ATORVASTATIN CALCIUM 80 MG/1
20 TABLET, FILM COATED ORAL AT BEDTIME
Qty: 0 | Refills: 0 | Status: DISCONTINUED | OUTPATIENT
Start: 2018-11-26 | End: 2018-11-30

## 2018-11-26 RX ORDER — CHOLECALCIFEROL (VITAMIN D3) 125 MCG
1000 CAPSULE ORAL DAILY
Qty: 0 | Refills: 0 | Status: DISCONTINUED | OUTPATIENT
Start: 2018-11-26 | End: 2018-11-30

## 2018-11-26 RX ORDER — RANITIDINE HYDROCHLORIDE 150 MG/1
300 TABLET, FILM COATED ORAL AT BEDTIME
Qty: 0 | Refills: 0 | Status: DISCONTINUED | OUTPATIENT
Start: 2018-11-26 | End: 2018-11-27

## 2018-11-26 RX ORDER — FOLIC ACID 0.8 MG
1 TABLET ORAL DAILY
Qty: 0 | Refills: 0 | Status: DISCONTINUED | OUTPATIENT
Start: 2018-11-26 | End: 2018-11-30

## 2018-11-26 RX ORDER — SODIUM CHLORIDE 9 MG/ML
3 INJECTION INTRAMUSCULAR; INTRAVENOUS; SUBCUTANEOUS EVERY 8 HOURS
Qty: 0 | Refills: 0 | Status: DISCONTINUED | OUTPATIENT
Start: 2018-11-26 | End: 2018-11-30

## 2018-11-26 RX ORDER — ASPIRIN/CALCIUM CARB/MAGNESIUM 324 MG
81 TABLET ORAL DAILY
Qty: 0 | Refills: 0 | Status: DISCONTINUED | OUTPATIENT
Start: 2018-11-26 | End: 2018-11-30

## 2018-11-26 RX ADMIN — SODIUM CHLORIDE 3 MILLILITER(S): 9 INJECTION INTRAMUSCULAR; INTRAVENOUS; SUBCUTANEOUS at 22:24

## 2018-11-26 RX ADMIN — Medication 325 MILLIGRAM(S): at 19:20

## 2018-11-26 NOTE — ED ADULT NURSE NOTE - NSIMPLEMENTINTERV_GEN_ALL_ED
Implemented All Universal Safety Interventions:  [place] to call system. Call bell, personal items and telephone within reach. Instruct patient to call for assistance. Room bathroom lighting operational. Non-slip footwear when patient is off stretcher. Physically safe environment: no spills, clutter or unnecessary equipment. Stretcher in lowest position, wheels locked, appropriate side rails in place.

## 2018-11-26 NOTE — ED PROVIDER NOTE - MEDICAL DECISION MAKING DETAILS
63 year old female with longstanding RA currently on methotrexate and simponi, prednisone 10 mg presented today with chest pain, probably musculoskeletal, EKG no acute changes, will obtain CE, chest xray and possibly CDU admission for stress test. ZR

## 2018-11-26 NOTE — ED ADULT NURSE NOTE - OBJECTIVE STATEMENT
pt has been having these symptoms for 2 to 3 months.  Pt has epigastric pan radiating to the left arm and left upper back Pt when to her arthritis md and was refereed to come to the ed for evaluation Pt states the pain is when she takes a deep breath and the are left shoulder and arm pina also for 3 months.  Pt today felt dizzy and has a headache.  IVL placed and pending md evaluation Sunday

## 2018-11-26 NOTE — ED PROVIDER NOTE - OBJECTIVE STATEMENT
63 year old Iraqi speaking female sent from Rheumatology clinic today for chest pain, which increased to touch and back pain. She also c/o B/L shoulder pain feeling weak and dizzy. Last night ,she had SOB and 1 episode of dizziness. PMHx of RA since 89 with treatment of DMARD, methotrexate- last time took 2 months ago, remicade and now currently on xelijanz. No history of heart disease.

## 2018-11-26 NOTE — ED ADULT TRIAGE NOTE - CHIEF COMPLAINT QUOTE
Pt woke up feeling dizzy today, later on in the morning developed headache, weakness, abdominal pain, nausea.  Pt has hx bronchitis, has had a mild cough x 3 weeks that worsened today with associated intermittent chest pain, worse at night when laying down.  Pt currently reports mild chest discomfort radiating from lower epigastric area through to back, pt coughing intermittently in Triage. Pt denies cardiac hx.

## 2018-11-26 NOTE — ED ADULT TRIAGE NOTE - PAIN RATING/NUMBER SCALE (0-10): REST
[id number] [address] [place] Patient: Peter Gray MRN: [id number] MGQ:6[birth year]6/[id number] About your hospitalization You were admitted on:  July 9, 2018 You last received care in the:  [facility] You were discharged on:  July 12, 2018 Why you were hospitalized Your primary diagnosis was:  Hyponatremia Follow-up Information Follow up With Details Comments Contact Info Joan Rg MD In 1 week Patient being discharged to SNF. [address] [facility] [address] [facility] [address] [phone] [facility]   [address] [place] [address] 
[phone] Your Scheduled Appointments Monday July 30, 2018 10:30 AM EDT  
POST HOSPITAL with Michelle Dowling NP [facility] ([facility]) [place] [id number] [phone] Discharge Orders None A check juan manuel indicates which time of day the medication should be taken. My Medications START taking these medications Instructions Each Dose to Equal  
 Morning Noon Evening Bedtime  
 lisinopril 20 mg tablet Commonly known as:  Alysa [place] Start taking on:  7/13/2018 Your last dose was: Your next dose is: Take 1 Tab by mouth daily. 20 mg CHANGE how you take these medications Instructions Each Dose to Equal  
 Morning Noon Evening Bedtime  
 warfarin 2.5 mg tablet Commonly known as:  COUMADIN What changed:  additional instructions Your last dose was: Your next dose is: Take 1 Tab by mouth daily. Take 2 pills (5 mg) Mon, Tues, Wed, Thur, Sat, Sun; and 1 pill only (2.5 mg) Fri  
 2.5 mg  
    
   
   
   
  
  
CONTINUE taking these medications  Instructions Each Dose to Equal  
 Morning Noon Evening Bedtime  
 albuterol 2.5 mg /3 mL (0.083 %) nebulizer solution Commonly known as:  PROVENTIL VENTOLIN Your last dose was: Your next dose is:    
   
   
 3 mL by Nebulization route every four (4) hours as needed for Wheezing. 2.5 mg  
    
   
   
   
  
 amLODIPine 10 mg tablet Commonly known as:  Avelino Garner Your last dose was: Your next dose is: Take 10 mg by mouth daily. 10 mg  
    
   
   
   
  
 aspirin delayed-release 81 mg tablet Your last dose was: Your next dose is: Take 81 mg by mouth daily. 81 mg  
    
   
   
   
  
 COLACE 100 mg capsule Generic drug:  docusate sodium Your last dose was: Your next dose is: Take 100 mg by mouth two (2) times daily as needed for Constipation. 100 mg  
    
   
   
   
  
 digoxin 0.125 mg tablet Commonly known as:  Brandijanice Walsh Your last dose was: Your next dose is:    
   
   
 take 1 tablet by mouth once daily  
     
   
   
   
  
 melatonin 3 mg tablet Your last dose was: Your next dose is: Take 1 Tab by mouth nightly as needed. 3 mg MIRALAX 17 gram packet Generic drug:  polyethylene glycol Your last dose was: Your next dose is: Take 17 g by mouth daily as needed. 17 g OXYGEN-AIR DELIVERY SYSTEMS Your last dose was: Your next dose is:    
   
   
 2 L/min by Does Not Apply route daily. Continuous. Company is [employer] 2 L/min  
    
   
   
   
  
 pravastatin 40 mg tablet Commonly known as:  PRAVACHOL Your last dose was: Your next dose is: Take 40 mg by mouth nightly. 40 mg  
    
   
   
   
  
 tiotropium-olodaterol 2.5-2.5 mcg/actuation Mist  
Commonly known as:  Brunilda Gilbert Your last dose was: Your next dose is: Take 2 Inhalation by inhalation daily. 2 Inhalation STOP taking these medications   
 chlorthalidone 25 mg tablet Commonly known as:  HYGROTEN  
   
  
 levoFLOXacin 250 mg tablet Commonly known as:  LEVAQUIN  
   
  
 ondansetron hcl 4 mg tablet Commonly known as:  ZOFRAN  
   
  
 potassium chloride 20 mEq packet Commonly known as:  KLOR-CON Where to Get Your Medications Information on where to get these meds will be given to you by the nurse or doctor. ! Ask your nurse or doctor about these medications  
  lisinopril 20 mg tablet Discharge Instructions MyChart Activation Thank you for requesting access to [facility]. Please follow the instructions below to securely access and download your online medical record. [facility] allows you to send messages to your doctor, view your test results, renew your prescriptions, schedule appointments, and more. How Do I Sign Up? 1. In your internet browser, go to [email or web address] 
2. Click on the First Time User? Click Here link in the Sign In box. You will be redirect to the New Member Sign Up page. 3. Enter your [facility] Access Code exactly as it appears below. You will not need to use this code after youve completed the sign-up process. If you do not sign up before the expiration date, you must request a new code. [facility] Access Code: [email or web address] Expires: 2018 11:42 AM (This is the date your [facility] access code will ) 4. Enter the last four digits of your Social Security Number (xxxx) and Date of Birth (mm/dd/yyyy) as indicated and click Submit. You will be taken to the next sign-up page. 5. Create a Petcube ID. This will be your [facility] login ID and cannot be changed, so think of one that is secure and easy to remember. 6. Create a Petcube password. You can change your password at any time. 7. Enter your Password Reset Question and Answer. This can be used at a later time if you forget your password. 8. Enter your e-mail address. You will receive e-mail notification when new information is available in [address] 9. Click Sign Up. You can now view and download portions of your medical record. 10. Click the Download Summary menu link to download a portable copy of your medical information. Additional Information If you have questions, please visit the Frequently Asked Questions section of the [facility] website at [email or web address] Remember, [facility] is NOT to be used for urgent needs. For medical emergencies, dial 911. MyChart Activation Thank you for requesting access to [facility]. Please follow the instructions below to securely access and download your online medical record. [facility] allows you to send messages to your doctor, view your test results, renew your prescriptions, schedule appointments, and more. How Do I Sign Up? 
 
11. In your internet browser, go to [email or web address] 
12. Click on the First Time User? Click Here link in the Sign In box. You will be redirect to the New Member Sign Up page. 15. Enter your [facility] Access Code exactly as it appears below. You will not need to use this code after youve completed the sign-up process. If you do not sign up before the expiration date, you must request a new code. [facility] Access Code: [email or web address] Expires: 2018 11:42 AM (This is the date your [facility] access code will ) 14. Enter the last four digits of your Social Security Number (xxxx) and Date of Birth (mm/dd/yyyy) as indicated and click Submit. You will be taken to the next sign-up page. 15. Create a [facility] ID. This will be your [facility] login ID and cannot be changed, so think of one that is secure and easy to remember. 12. Create a [facility] password. You can change your password at any time. 16. Enter your Password Reset Question and Answer. This can be used at a later time if you forget your password. 25. Enter your e-mail address. You will receive e-mail notification when new information is available in [address] 19. Click Sign Up. You can now view and download portions of your medical record. 20. Click the Download Summary menu link to download a portable copy of your medical information. Additional Information If you have questions, please visit the Frequently Asked Questions section of the [facility] website at [email or web address] Remember, [facility] is NOT to be used for urgent needs. For medical emergencies, dial 911. Patient armband removed and shredded Hypokalemia: Care Instructions Your Care Instructions Hypokalemia (say \"lh-fg-rzh-LUTHER-leonid-uh\") is a low level of potassium. The heart, muscles, kidneys, and nervous system all need potassium to work well. This problem has many different causes. Kidney problems, diet, and medicines like diuretics and laxatives can cause it. So can vomiting or diarrhea. In some cases, cancer is the cause. Your doctor may do tests to find the cause of your low potassium levels. You may need medicines to bring your potassium levels back to normal. You may also need regular blood tests to check your potassium. If you have very low potassium, you may need intravenous (IV) medicines. You also may need tests to check the electrical activity of your heart. Heart problems caused by low potassium levels can be very serious. Follow-up care is a key part of your treatment and safety. Be sure to make and go to all appointments, and call your doctor if you are having problems. It's also a good idea to know your test results and keep a list of the medicines you take. How can you care for yourself at home? · If your doctor recommends it, eat foods that have a lot of potassium. These include fresh fruits, juices, and vegetables. They also include nuts, beans, and milk. · Be safe with medicines. If your doctor prescribes medicines or potassium supplements, take them exactly as directed. Call your doctor if you have any problems with your medicines. · Get your potassium levels tested as often as your doctor tells you. When should you call for help? Call 911 anytime you think you may need emergency care. For example, call if: 
  · You feel like your heart is missing beats. Heart problems caused by low potassium can cause death.  
  · You passed out (lost consciousness).  
  · You have a seizure.  
 Call your doctor now or seek immediate medical care if: 
  · You feel weak or unusually tired.  
  · You have severe arm or leg cramps.  
  · You have tingling or numbness.  
  · You feel sick to your stomach, or you vomit.  
  · You have belly cramps.  
  · You feel bloated or constipated.  
  · You have to urinate a lot.  
  · You feel very thirsty most of the time.  
  · You are dizzy or lightheaded, or you feel like you may faint.  
  · You feel depressed, or you lose touch with reality.  
 Watch closely for changes in your health, and be sure to contact your doctor if: 
  · You do not get better as expected. Where can you learn more? Go to http://brittney-eliseo.info/. Enter G358 in the search box to learn more about \"Hypokalemia: Care Instructions. \" Current as of: May 12, 2017 Content Version: 11.7 © 9728-1261 Healthwise, Incorporated. Care instructions adapted under license by [employer] (which disclaims liability or warranty for this information). If you have questions about a medical condition or this instruction, always ask your healthcare professional. [place] [address] any warranty or liability for your use of this information. RouterShare Announcement We are excited to announce that we are making your provider's discharge notes available to you in Riidr. You will see these notes when they are completed and signed by the physician that discharged you from your recent hospital stay. If you have any questions or concerns about any information you see in Riidr, please call the Health Information Department where you were seen or reach out to your Primary Care Provider for more information about your plan of care. Introducing [facility]! [employer] introduces Riidr patient portal. Now you can access parts of your medical record, email your doctor's office, and request medication refills online. 1. In your internet browser, go to [email or web address] 2. Click on the First Time User? Click Here link in the Sign In box. You will see the New Member Sign Up page. 3. Enter your Riidr Access Code exactly as it appears below. You will not need to use this code after youve completed the sign-up process. If you do not sign up before the expiration date, you must request a new code. · Riidr Access Code: [email or web address] Expires: 9/4/2018 11:42 AM 
 
4. Enter the last four digits of your Social Security Number (xxxx) and Date of Birth (mm/dd/yyyy) as indicated and click Submit. You will be taken to the next sign-up page. 5. Create a Riidr ID. This will be your Riidr login ID and cannot be changed, so think of one that is secure and easy to remember. 6. Create a Riidr password. You can change your password at any time. 7. Enter your Password Reset Question and Answer. This can be used at a later time if you forget your password. 8. Enter your e-mail address. You will receive e-mail notification when new information is available in [address] 9. Click Sign Up. You can now view and download portions of your medical record.  
10. Click the Download Summary menu link to download a portable copy of your medical information. If you have questions, please visit the Frequently Asked Questions section of the FluTrends Internationalhart website. Remember, [employer] is NOT to be used for urgent needs. For medical emergencies, dial 911. Now available from your iPhone and Android! Introducing Ryan Still As a [employer] patient, I wanted to make you aware of our electronic visit tool called Ryan Still. [employer] 24/7 allows you to connect within minutes with a medical provider 24 hours a day, seven days a week via a mobile device or tablet or logging into a secure website from your computer. You can access Ryan Still from anywhere in the [place]. A virtual visit might be right for you when you have a simple condition and feel like you just dont want to get out of bed, or cant get away from work for an appointment, when your regular [employer] provider is not available (evenings, weekends or holidays), or when youre out of town and need minor care. Electronic visits cost only $49 and if the [employer] 24/7 provider determines a prescription is needed to treat your condition, one can be electronically transmitted to a nearby pharmacy*. Please take a moment to enroll today if you have not already done so. The enrollment process is free and takes just a few minutes. To enroll, please download the [employer] 24/7 todd to your tablet or phone, or visit [email or web address] to enroll on your computer. And, as an [address] [place] [address] patient with a [employer] account, the results of your visits will be scanned into your electronic medical record and your primary care provider will be able to view the scanned results. We urge you to continue to see your regular [employer] provider for your ongoing medical care.   And while your primary care provider may not be the one available when you seek a Ryan Still virtual visit, the peace of mind you get from getting a real diagnosis real time can be priceless. For more information on Ryan Bipinsabine, view our Frequently Asked Questions (FAQs) at [email or web address]. Sincerely, 
 
Preethi Kerr MD 
Chief Medical Officer [employer] *:  certain medications cannot be prescribed via Ryan Still Providers Seen During Your Hospitalization Provider Specialty Primary office phone Mammie Felty, DO Emergency Medicine [phone] Jesus Lechuga MD Family Practice [phone] Your Primary Care Physician (PCP) Primary Care Physician Office Phone Office Fax Nuris Harrischeryle [phone] You are allergic to the following Allergen Reactions Codeine Swelling Morphine Itching Sulfa (Sulfonamide Antibiotics) Other (comments) Kidney problems. Recent Documentation Height Weight BMI Smoking Status 1.676 m 74.6 kg 26.55 kg/m2 Former Smoker Emergency Contacts Name Discharge Info Relation Home Work Mobile [address] [facility] CAREGIVER [3] Daughter [21] [phone] [facility] DISCHARGE CAREGIVER [3] Daughter [21] [phone] [facility] New  Child [2] [phone] Patient Belongings The following personal items are in your possession at time of discharge: 
  Dental Appliances: None         Home Medications: None   Jewelry: None  Clothing: At bedside    Other Valuables: None Please provide this summary of care documentation to your next provider. Signatures-by signing, you are acknowledging that this After Visit Summary has been reviewed with you and you have received a copy. Patient Signature:  ____________________________________________________________ Date:  ____________________________________________________________  
  
Regina Wen  Provider Signature: ____________________________________________________________ Date:  ____________________________________________________________ 7

## 2018-11-27 VITALS
OXYGEN SATURATION: 98 % | TEMPERATURE: 98 F | HEART RATE: 62 BPM | RESPIRATION RATE: 18 BRPM | SYSTOLIC BLOOD PRESSURE: 121 MMHG | DIASTOLIC BLOOD PRESSURE: 77 MMHG

## 2018-11-27 LAB
CHOLEST SERPL-MCNC: 221 MG/DL — HIGH (ref 10–199)
HBA1C BLD-MCNC: 5.8 % — HIGH (ref 4–5.6)
HDLC SERPL-MCNC: 67 MG/DL — SIGNIFICANT CHANGE UP
LIPID PNL WITH DIRECT LDL SERPL: 133 MG/DL — HIGH
TOTAL CHOLESTEROL/HDL RATIO MEASUREMENT: 3.3 RATIO — SIGNIFICANT CHANGE UP (ref 3.3–7.1)
TRIGL SERPL-MCNC: 106 MG/DL — SIGNIFICANT CHANGE UP (ref 10–149)

## 2018-11-27 PROCEDURE — G0378: CPT

## 2018-11-27 PROCEDURE — 80053 COMPREHEN METABOLIC PANEL: CPT

## 2018-11-27 PROCEDURE — 85027 COMPLETE CBC AUTOMATED: CPT

## 2018-11-27 PROCEDURE — A9500: CPT

## 2018-11-27 PROCEDURE — 75571 CT HRT W/O DYE W/CA TEST: CPT

## 2018-11-27 PROCEDURE — 78452 HT MUSCLE IMAGE SPECT MULT: CPT

## 2018-11-27 PROCEDURE — 93018 CV STRESS TEST I&R ONLY: CPT

## 2018-11-27 PROCEDURE — 99217: CPT

## 2018-11-27 PROCEDURE — 93017 CV STRESS TEST TRACING ONLY: CPT

## 2018-11-27 PROCEDURE — 96374 THER/PROPH/DIAG INJ IV PUSH: CPT | Mod: XU

## 2018-11-27 PROCEDURE — 96375 TX/PRO/DX INJ NEW DRUG ADDON: CPT

## 2018-11-27 PROCEDURE — 93016 CV STRESS TEST SUPVJ ONLY: CPT

## 2018-11-27 PROCEDURE — 93005 ELECTROCARDIOGRAM TRACING: CPT | Mod: 76

## 2018-11-27 PROCEDURE — 83036 HEMOGLOBIN GLYCOSYLATED A1C: CPT

## 2018-11-27 PROCEDURE — 71046 X-RAY EXAM CHEST 2 VIEWS: CPT

## 2018-11-27 PROCEDURE — 99285 EMERGENCY DEPT VISIT HI MDM: CPT | Mod: 25

## 2018-11-27 PROCEDURE — 85610 PROTHROMBIN TIME: CPT

## 2018-11-27 PROCEDURE — 75571 CT HRT W/O DYE W/CA TEST: CPT | Mod: 26

## 2018-11-27 PROCEDURE — 84484 ASSAY OF TROPONIN QUANT: CPT

## 2018-11-27 PROCEDURE — 80061 LIPID PANEL: CPT

## 2018-11-27 PROCEDURE — 85730 THROMBOPLASTIN TIME PARTIAL: CPT

## 2018-11-27 PROCEDURE — 78452 HT MUSCLE IMAGE SPECT MULT: CPT | Mod: 26

## 2018-11-27 RX ORDER — ACETAMINOPHEN 500 MG
500 TABLET ORAL ONCE
Qty: 0 | Refills: 0 | Status: COMPLETED | OUTPATIENT
Start: 2018-11-27 | End: 2018-11-27

## 2018-11-27 RX ORDER — DIPHENHYDRAMINE HCL 50 MG
25 CAPSULE ORAL ONCE
Qty: 0 | Refills: 0 | Status: COMPLETED | OUTPATIENT
Start: 2018-11-27 | End: 2018-11-27

## 2018-11-27 RX ORDER — INFLIXIMAB-DYYB 120 MG/ML
0 INJECTION SUBCUTANEOUS
Qty: 0 | Refills: 0 | COMMUNITY

## 2018-11-27 RX ORDER — METOCLOPRAMIDE HCL 10 MG
10 TABLET ORAL ONCE
Qty: 0 | Refills: 0 | Status: COMPLETED | OUTPATIENT
Start: 2018-11-27 | End: 2018-11-27

## 2018-11-27 RX ADMIN — Medication 500 MILLIGRAM(S): at 05:05

## 2018-11-27 RX ADMIN — SODIUM CHLORIDE 3 MILLILITER(S): 9 INJECTION INTRAMUSCULAR; INTRAVENOUS; SUBCUTANEOUS at 05:49

## 2018-11-27 RX ADMIN — Medication 25 MILLIGRAM(S): at 08:57

## 2018-11-27 RX ADMIN — Medication 10 MILLIGRAM(S): at 08:57

## 2018-11-27 NOTE — ED CDU PROVIDER INITIAL DAY NOTE - OBJECTIVE STATEMENT
Patient is 63 year old female hx of RA, HLD presented to ED sent from Rheumatology clinic today for chest pain which increased radiating towards the back pain. She also c/o B/L shoulder pain feeling weak and dizzy. Last night ,she had SOB and 1 episode of dizziness. Patient reported that at night she wakes up feeling she can't breath and she has tightness in the center of the chest. PMHx of RA since 89 with treatment of DMARD, methotrexate- last time took 2 months ago, remicade and now currently on xelijanz. No history of heart disease.  In ED, Patient had Troponin x 2 negative, EKG no signs of acute ischemia and chest x ray no signs of acute pathology. Pt was sent to CDU for telemetry monitoring, frequent evaluations, and Nuclear stress testing.

## 2018-11-27 NOTE — ED ADULT NURSE REASSESSMENT NOTE - NS ED NURSE REASSESS COMMENT FT1
17.30 hrs Pt was reviewed by DR Owen SCHWARZ MD & FERNANDO Ramos with all the  results . Pt is Discharged Ml Out . Has stable vitals  A&OX4 PERRLA Steady gait  Pt denies N/V/D fever chills CP SOB Stable to go home  FERNANDO Ramos Explained the follow up care & gave the discharge summary  Pt verbalized the understanding on follow up care pt stable to go home
1906 Labs sent as ordered Oscar
Pt received from SAVANAH Murry. Pt oriented to CDU & plan of care was discussed. Pt A&O x 4. Pt in CDU for cardiac monitoring. Pt denies chest pain as of now. Pt c/o headache 7/10, but states she just got pain medications before she came over from the ED. Pt on cardiac monitor in NSR, HR in 70's. Safety & comfort measures maintained. Call bell in reach. Will continue to monitor.
07.00 am   received the patient from  SAVANAH Briones  Pt is observed for CP Awaiting for Nuc stress test   07.30  Am reassessment  Pt is A&OX 4 Resting Pt denies CP N/V/D fever chills CP SOB  Pt A&O x 4. C/O Headache Pt has already received tylenol @ 5 am  Pt denies any chest pain, SOB, or palpations as of now. Pt on a cardiac monitor in NSR, HR in 60's.Safety & comfort measures maintained. Call bell in reach. IV site looks clean & dry No signs of infiltration Noted  Pt denies pain at IV site Will continue to monitor.

## 2018-11-27 NOTE — ED CDU PROVIDER INITIAL DAY NOTE - DETAILS
CHEST PAIN R/O ACS  -[facility]  -[facility]  -[facility] EVAL  -CT CORONARY OR STRESS TEST  -CASE D/W ATTENDING DR CEDEÑO CHEST PAIN R/O ACS  -TELE  -[facility]AMI  -FREQ EVAL  -STRESS TEST  -CASE D/W ATTENDING DR CEDEÑO

## 2018-11-27 NOTE — ED CDU PROVIDER SUBSEQUENT DAY NOTE - MEDICAL DECISION MAKING DETAILS
Attending Shon Weaver DO: 64 yo female admitted to observation for chest pain pending CT coronary. Pt denies chest pain at present but endorses left sided neck pain radiating up to left side of head, worse with head movement. No vision changes. headache came on gradually. No acute events overnight on telemetry. Pt in mild discomfort due to headache but nontoxic appearing, A&Ox3, neck supple, no focal neuro deficits. Pt being given reglan and benadryl for headache, pending CTC and reevaluation for disposition.

## 2018-11-27 NOTE — ED CDU PROVIDER INITIAL DAY NOTE - MEDICAL DECISION MAKING DETAILS
63 year old female with longstanding RA currently on methotrexate and simponi, prednisone 10 mg presented today with chest pain, probably musculoskeletal, EKG no acute changes, troponin neg in ED, will admit to CDU for stress thallium test tomorrow . ZR

## 2018-11-27 NOTE — ED ADULT NURSE REASSESSMENT NOTE - NSIMPLEMENTINTERV_GEN_ALL_ED
Implemented All Universal Safety Interventions:  [place] to call system. Call bell, personal items and telephone within reach. Instruct patient to call for assistance. Room bathroom lighting operational. Non-slip footwear when patient is off stretcher. Physically safe environment: no spills, clutter or unnecessary equipment. Stretcher in lowest position, wheels locked, appropriate side rails in place.

## 2018-11-27 NOTE — ED CDU PROVIDER INITIAL DAY NOTE - MUSCULOSKELETAL, MLM
(+) Multiple deformity of joints, of wrist, MCP and PAPs, + nodules in left achilles and left olecranon. Spine appears normal

## 2018-11-27 NOTE — ED CDU PROVIDER DISPOSITION NOTE - PLAN OF CARE
1. Follow up with your PMD within 48-72 hours. You may call to schedule appointment this week in Cardiology clinic by calling [phone].  2. Show copies of your reports given to you. Recommend Aspirin 81mg over the counter daily until further evaluation.  Take all of your other medications as previously prescribed.   3. Worsening or continued chest pain, shortness of breath, weakness, return to ED. 1. Stay hydrated.  2. Continue Current Home Medications, including Aspirin 81mg daily.   3. Follow up with your PCP Dr. Pacheco in 1-2 days, your cholesterol and Hgb A1c are high, as well as abnormality in lungs found on CT scan, please have followed up by your Doctor. Also, follow up with Cardiology #[phone] in 3-5 days. (Bring printed results to your doctor visits).  4. Return if symptoms, worsen, fever, weakness, chest pain, difficulty breathing, dizziness and all other concerns.

## 2018-11-27 NOTE — ED CDU PROVIDER SUBSEQUENT DAY NOTE - MUSCULOSKELETAL, MLM
pt has bony deformities of hands consistent with her RA. chest wall- ttp, no rash. back- paraspinal L side ttp, no rash. no spinal tenderness. no ecchymosis. L side straight leg raise elicits pain to L side lower back.

## 2018-11-27 NOTE — ED CDU PROVIDER DISPOSITION NOTE - CLINICAL COURSE
Patient is 63 year old female hx of RA, HLD presented to ED sent from Rheumatology clinic today for chest pain which increased radiating towards the back pain. She also c/o B/L shoulder pain feeling weak and dizzy. Last night ,she had SOB and 1 episode of dizziness. Patient reported that at night she wakes up feeling she can't breath and she has tightness in the center of the chest. PMHx of RA since 89 with treatment of DMARD, methotrexate- last time took 2 months ago, remicade and now currently on xelijanz. No history of heart disease.  In ED, Patient had Troponin x 2 negative, EKG no signs of acute ischemia and chest x ray no signs of acute pathology. Pt was sent to CDU for telemetry monitoring, frequent evaluations, and Nuclear stress testing. Patient is 63 year old female hx of RA, HLD presented to ED sent from Rheumatology clinic today for chest pain which increased radiating towards the back pain. She also c/o B/L shoulder pain feeling weak and dizzy. Last night ,she had SOB and 1 episode of dizziness. Patient reported that at night she wakes up feeling she can't breath and she has tightness in the center of the chest. PMHx of RA since 89 with treatment of DMARD, methotrexate- last time took 2 months ago, remicade and now currently on xelijanz. No history of heart disease.  In ED, Patient had Troponin x 2 negative, EKG no signs of acute ischemia and chest x ray no signs of acute pathology. Pt was sent to CDU for telemetry monitoring, frequent evaluations, and Nuclear stress testing. pt was switched to CTC but calcium score high- test equivocal. pt then had Nuc stress test- no evidence of ischemia. pt d/c to f/up outpt.

## 2018-11-27 NOTE — ED CDU PROVIDER SUBSEQUENT DAY NOTE - PROGRESS NOTE DETAILS
CDU PROGRESS NOTE FERNANDO FRYE: Pt resting comfortably, feeling well without complaint. NAD, VSS. No events on telemetry. Patient states her  will bring her Xeljanz xr 11mg in the morning to take. CDU PROGRESS NOTE FERNANDO FRYE: Pt c/o headache, requesting medication for pain. Pt is aox3, nad, vss, no neuro deficit. Will order Tylenol 500mg po and continue to monitor. CDU NOTE FERNANDO Roberts: pt resting comfortably, feels well except for HA-moderate despite Tylenol, no other complaints. no other neurologic complaints. pt still reports mild weakness to b/l legs. NAD recent VSS. no events on tele. reglan and benadryl ordered. denies numbness, bowel/urine incontinence. chest wall- ttp L side, no rash. back- L side paraspinal tenderness, no midline tenderness. +straight leg raise to ipsilateral L side back. pt with deformities to hands/fingers consistent with RA, but strength intact x 4, sensory intact x 4   pt good candidate for CTC, stress testing is backed up with multiple cases, will switch to CTC. CDU NOTE FERNANDO Roberts: pt's calcium score high, so Cardiologist did not inject with contrast. recommend pt have cardiology evaluation to discuss next step. CDU NOTE FERNANDO Roberts: Cardiologist Dr. Rain will come evaluate pt. CDU NOTE FERNANDO Roberts: per Dr. Rain, will get nuc stress test. CDU NOTE PA Armando: pt in stress test. CDU NOTE FERNANDO Roberts: pt resting comfortably, feels well without complaint. NAD recent VSS. no events on tele. nuc stress- no evidence of ischemia.   discussed with Dr. Rain and Dr. Weaver, pt stable for d/c

## 2018-11-27 NOTE — ED CDU PROVIDER SUBSEQUENT DAY NOTE - HISTORY
CDU PROGRESS NOTE FERNANDO FRYE: No interval change from previous note. Pt resting comfortably, feeling well without complaint. NAD, VSS. No events on telemetry.

## 2018-11-27 NOTE — ED CDU PROVIDER DISPOSITION NOTE - ATTENDING CONTRIBUTION TO CARE
Attending Shon Weaver DO: 64 yo female admitted to observation for chest pain pending CT coronary. Pt denies chest pain at present but endorses left sided neck pain radiating up to left side of head, worse with head movement. No vision changes. headache came on gradually. No acute events overnight on telemetry. Pt in mild discomfort due to headache but nontoxic appearing, A&Ox3, neck supple, no focal neuro deficits. CTC with high calcium score, stress performed, no ischemic areas, possible scar. Pt asymptomatic at this time, headache improved. Pt to be discharged with outpatient follow up.

## 2018-11-27 NOTE — ED CDU PROVIDER INITIAL DAY NOTE - PROGRESS NOTE DETAILS
CDU PROGRESS NOTE PA MELVA: Pt resting comfortably, feeling well without complaint. NAD, VSS. No events on telemetry.

## 2018-12-03 DIAGNOSIS — Z71.89 OTHER SPECIFIED COUNSELING: ICD-10-CM

## 2018-12-03 DIAGNOSIS — Z76.89 PERSONS ENCOUNTERING HEALTH SERVICES IN OTHER SPECIFIED CIRCUMSTANCES: ICD-10-CM

## 2018-12-11 ENCOUNTER — RX RENEWAL (OUTPATIENT)
Age: 63
End: 2018-12-11

## 2019-02-06 ENCOUNTER — APPOINTMENT (OUTPATIENT)
Dept: RHEUMATOLOGY | Facility: CLINIC | Age: 64
End: 2019-02-06
Payer: MEDICAID

## 2019-02-06 VITALS
DIASTOLIC BLOOD PRESSURE: 71 MMHG | BODY MASS INDEX: 26.06 KG/M2 | HEIGHT: 61 IN | OXYGEN SATURATION: 98 % | WEIGHT: 138 LBS | HEART RATE: 73 BPM | TEMPERATURE: 98.1 F | SYSTOLIC BLOOD PRESSURE: 108 MMHG

## 2019-02-06 DIAGNOSIS — M54.5 LOW BACK PAIN: ICD-10-CM

## 2019-02-06 PROCEDURE — 99215 OFFICE O/P EST HI 40 MIN: CPT

## 2019-02-08 LAB
25(OH)D3 SERPL-MCNC: 27 NG/ML
ALBUMIN SERPL ELPH-MCNC: 4 G/DL
ALP BLD-CCNC: 67 U/L
ALT SERPL-CCNC: 10 U/L
ANION GAP SERPL CALC-SCNC: 15 MMOL/L
AST SERPL-CCNC: 17 U/L
BASOPHILS # BLD AUTO: 0.02 K/UL
BASOPHILS NFR BLD AUTO: 0.3 %
BILIRUB SERPL-MCNC: 0.2 MG/DL
BUN SERPL-MCNC: 13 MG/DL
CALCIUM SERPL-MCNC: 9.2 MG/DL
CHLORIDE SERPL-SCNC: 103 MMOL/L
CO2 SERPL-SCNC: 24 MMOL/L
CREAT SERPL-MCNC: 0.76 MG/DL
CRP SERPL-MCNC: 0.37 MG/DL
EOSINOPHIL # BLD AUTO: 0.21 K/UL
EOSINOPHIL NFR BLD AUTO: 3.1 %
ERYTHROCYTE [SEDIMENTATION RATE] IN BLOOD BY WESTERGREN METHOD: 48 MM/HR
GLUCOSE SERPL-MCNC: 124 MG/DL
HCT VFR BLD CALC: 38 %
HGB BLD-MCNC: 12 G/DL
IMM GRANULOCYTES NFR BLD AUTO: 0.1 %
LYMPHOCYTES # BLD AUTO: 1.42 K/UL
LYMPHOCYTES NFR BLD AUTO: 20.9 %
MAN DIFF?: NORMAL
MCHC RBC-ENTMCNC: 28.2 PG
MCHC RBC-ENTMCNC: 31.6 GM/DL
MCV RBC AUTO: 89.2 FL
MONOCYTES # BLD AUTO: 0.45 K/UL
MONOCYTES NFR BLD AUTO: 6.6 %
NEUTROPHILS # BLD AUTO: 4.68 K/UL
NEUTROPHILS NFR BLD AUTO: 69 %
PLATELET # BLD AUTO: 257 K/UL
POTASSIUM SERPL-SCNC: 3.9 MMOL/L
PROT SERPL-MCNC: 7.3 G/DL
RBC # BLD: 4.26 M/UL
RBC # FLD: 13.7 %
SODIUM SERPL-SCNC: 142 MMOL/L
WBC # FLD AUTO: 6.79 K/UL

## 2019-02-21 ENCOUNTER — FORM ENCOUNTER (OUTPATIENT)
Age: 64
End: 2019-02-21

## 2019-02-22 ENCOUNTER — APPOINTMENT (OUTPATIENT)
Dept: RADIOLOGY | Facility: CLINIC | Age: 64
End: 2019-02-22

## 2019-02-22 ENCOUNTER — OUTPATIENT (OUTPATIENT)
Dept: OUTPATIENT SERVICES | Facility: HOSPITAL | Age: 64
LOS: 1 days | End: 2019-02-22
Payer: MEDICAID

## 2019-02-22 ENCOUNTER — APPOINTMENT (OUTPATIENT)
Dept: ULTRASOUND IMAGING | Facility: CLINIC | Age: 64
End: 2019-02-22
Payer: MEDICAID

## 2019-02-22 DIAGNOSIS — R10.9 UNSPECIFIED ABDOMINAL PAIN: ICD-10-CM

## 2019-02-22 PROCEDURE — 76700 US EXAM ABDOM COMPLETE: CPT

## 2019-02-22 PROCEDURE — 72120 X-RAY BEND ONLY L-S SPINE: CPT | Mod: 26

## 2019-02-22 PROCEDURE — 76700 US EXAM ABDOM COMPLETE: CPT | Mod: 26

## 2019-02-22 PROCEDURE — 72070 X-RAY EXAM THORAC SPINE 2VWS: CPT | Mod: 26

## 2019-02-22 PROCEDURE — 72070 X-RAY EXAM THORAC SPINE 2VWS: CPT

## 2019-02-22 PROCEDURE — 72120 X-RAY BEND ONLY L-S SPINE: CPT

## 2019-03-01 PROCEDURE — G9005: CPT

## 2019-03-05 ENCOUNTER — NON-APPOINTMENT (OUTPATIENT)
Age: 64
End: 2019-03-05

## 2019-03-05 ENCOUNTER — APPOINTMENT (OUTPATIENT)
Dept: CARDIOLOGY | Facility: HOSPITAL | Age: 64
End: 2019-03-05

## 2019-03-05 VITALS
WEIGHT: 139.25 LBS | DIASTOLIC BLOOD PRESSURE: 82 MMHG | HEART RATE: 64 BPM | HEIGHT: 61 IN | OXYGEN SATURATION: 99 % | SYSTOLIC BLOOD PRESSURE: 126 MMHG | BODY MASS INDEX: 26.29 KG/M2

## 2019-03-05 NOTE — PHYSICAL EXAM
[General Appearance - Well Developed] : well developed [Normal Appearance] : normal appearance [General Appearance - Well Nourished] : well nourished [Normal Oral Mucosa] : normal oral mucosa [Normal Oropharynx] : normal oropharynx [] : no respiratory distress [Respiration, Rhythm And Depth] : normal respiratory rhythm and effort [Auscultation Breath Sounds / Voice Sounds] : lungs were clear to auscultation bilaterally [Heart Rate And Rhythm] : heart rate and rhythm were normal [Heart Sounds] : normal S1 and S2 [Murmurs] : no murmurs present [Arterial Pulses Normal] : the arterial pulses were normal [Edema] : no peripheral edema present [Bowel Sounds] : normal bowel sounds [Skin Color & Pigmentation] : normal skin color and pigmentation [No Venous Stasis] : no venous stasis [Affect] : the affect was normal [FreeTextEntry1] : Joint deformities noted in bilateral hands

## 2019-03-05 NOTE — DISCUSSION/SUMMARY
[FreeTextEntry1] : A/P: 64 year old F pt with PMH of long-standing RA p/w mild chest discomfort starting 10/2018.\par \par - coronary calcium score elevated followed by nuclear stress test showing attenuation with possible apical scar\par - clinical presentation stable since the onset of sx in 10/2018 per pt\par - check 2D echo to assess for wall motion abnormalities\par - pt already on aspirin 81 and atorvastatin 40, c/w both meds\par - RTC in 3 months, will contact pt earlier if echo abnormal\par \par [place] Loera

## 2019-03-05 NOTE — HISTORY OF PRESENT ILLNESS
[FreeTextEntry1] : 64 year old F pt with PMH of long-standing RA p/w mild chest discomfort starting 10/2018. Pt was evaluated at [facility] ED with coronary CT, which showed calcium score of 470. She then had nuclear stress test, which showed perfusion defect with normal wall motion suggestive of attenuation with possible apical scar. Pt did not see a cardiologist during the hospital stay and was discharged. Her sx persisted and she was referred from rheumatology office for further cardiac evaluation.\par \par Pt describes her chest discomfort as pressure-like sensation across her chest with no radiation. Each episode lasts few seconds at a time and occurs randomly whether she's exerting herself or not. She has mild SOB as well with each episode but resting makes her sx better. Pt has been compliant with her RA medications and otherwise denied any new onset of symptoms. She also denied any recent changes in meds, recent travels, and sick contacts.\par \par Cardiac studies:\par 11/27/2018 - Coronary CT\par Coronary arteries: \par Coronary artery calcium Agatston score:  \par Left main (LM) coronary artery:  0\par Left anterior descending (LAD) coronary artery:  371\par Left circumflex (LCX) coronary artery:  47\par Right coronary artery (RCA):  0\par Total:  418\par \par 11/27/2018 - Nuc stress test\par The left ventricle was small in size. There are mild\par defects in the distal anterior, apical, and distal\par inferolateral walls that are fixed and demonstrate\par preserved wall motion suggestive of attenuation artifact.\par As these defects did not correct with prone imaging, an\par area of scar in this territory cannot be excluded.\par Post-stress gated wall motion analysis was performed\par (LVEF > 70%;LVEDV = 49 ml.), revealing normal LV function.\par

## 2019-03-18 ENCOUNTER — APPOINTMENT (OUTPATIENT)
Dept: PULMONOLOGY | Facility: CLINIC | Age: 64
End: 2019-03-18
Payer: MEDICAID

## 2019-03-18 VITALS
WEIGHT: 144 LBS | OXYGEN SATURATION: 99 % | BODY MASS INDEX: 27.19 KG/M2 | DIASTOLIC BLOOD PRESSURE: 68 MMHG | TEMPERATURE: 97.5 F | HEART RATE: 64 BPM | HEIGHT: 61 IN | SYSTOLIC BLOOD PRESSURE: 110 MMHG | RESPIRATION RATE: 18 BRPM

## 2019-03-18 PROCEDURE — ZZZZZ: CPT

## 2019-03-18 PROCEDURE — 99204 OFFICE O/P NEW MOD 45 MIN: CPT | Mod: 25

## 2019-03-18 PROCEDURE — 94726 PLETHYSMOGRAPHY LUNG VOLUMES: CPT

## 2019-03-18 PROCEDURE — 94060 EVALUATION OF WHEEZING: CPT

## 2019-03-18 PROCEDURE — 94729 DIFFUSING CAPACITY: CPT

## 2019-03-18 NOTE — HISTORY OF PRESENT ILLNESS
[FreeTextEntry1] : Patient with hx of RA x 19 years, one year of progressive increase in dyspnea. She notes when she climbs flight of steps or walks quickly, she develops dyspnea. She also notes chronic cough. She notes occasional GERD symptoms, and has been evaluated for atypical chest pain by cardiology. No pets at home, no birds. Worked as .

## 2019-03-18 NOTE — REVIEW OF SYSTEMS
[Postnasal Drip] : postnasal drip [As Noted in HPI] : as noted in HPI [Heartburn] : heartburn [Myalgias] : myalgias [Arthralgias] : arthralgias [Headache] : headache [Depression] : depression [Negative] : Dermatologic

## 2019-03-18 NOTE — ASSESSMENT
[FreeTextEntry1] : 1. ILD: Coronary artery scan showed evidence of ILD, although PFTs are normal. Will obtain HRCT of chest. Probably secondary to RA, and would not offer any specific treatment at this time in view of normal PFTs. Will reconsider if symptoms, PFTS or imaging worsen in future. HRCT now, and repeat PFTs in 6 months. Protonix for possible GERD symptoms and to possibly help ILD.

## 2019-03-19 ENCOUNTER — RX RENEWAL (OUTPATIENT)
Age: 64
End: 2019-03-19

## 2019-03-21 ENCOUNTER — APPOINTMENT (OUTPATIENT)
Dept: CV DIAGNOSITCS | Facility: HOSPITAL | Age: 64
End: 2019-03-21

## 2019-03-22 ENCOUNTER — APPOINTMENT (OUTPATIENT)
Dept: RHEUMATOLOGY | Facility: CLINIC | Age: 64
End: 2019-03-22
Payer: MEDICAID

## 2019-03-22 VITALS
HEART RATE: 68 BPM | BODY MASS INDEX: 27.19 KG/M2 | DIASTOLIC BLOOD PRESSURE: 70 MMHG | HEIGHT: 61 IN | SYSTOLIC BLOOD PRESSURE: 112 MMHG | TEMPERATURE: 97.8 F | RESPIRATION RATE: 16 BRPM | WEIGHT: 144 LBS | OXYGEN SATURATION: 99 %

## 2019-03-22 LAB
ALBUMIN SERPL ELPH-MCNC: 4.2 G/DL
ALP BLD-CCNC: 62 U/L
ALT SERPL-CCNC: 10 U/L
ANION GAP SERPL CALC-SCNC: 12 MMOL/L
AST SERPL-CCNC: 15 U/L
BASOPHILS # BLD AUTO: 0.03 K/UL
BASOPHILS NFR BLD AUTO: 0.5 %
BILIRUB SERPL-MCNC: 0.3 MG/DL
BUN SERPL-MCNC: 13 MG/DL
CALCIUM SERPL-MCNC: 9.5 MG/DL
CHLORIDE SERPL-SCNC: 103 MMOL/L
CO2 SERPL-SCNC: 28 MMOL/L
CREAT SERPL-MCNC: 0.73 MG/DL
EOSINOPHIL # BLD AUTO: 0.18 K/UL
EOSINOPHIL NFR BLD AUTO: 2.8 %
ERYTHROCYTE [SEDIMENTATION RATE] IN BLOOD BY WESTERGREN METHOD: 47 MM/HR
GLUCOSE SERPL-MCNC: 107 MG/DL
HCT VFR BLD CALC: 41 %
HGB BLD-MCNC: 12.9 G/DL
IMM GRANULOCYTES NFR BLD AUTO: 0.2 %
LYMPHOCYTES # BLD AUTO: 1.75 K/UL
LYMPHOCYTES NFR BLD AUTO: 27 %
MAN DIFF?: NORMAL
MCHC RBC-ENTMCNC: 28.6 PG
MCHC RBC-ENTMCNC: 31.5 GM/DL
MCV RBC AUTO: 90.9 FL
MONOCYTES # BLD AUTO: 0.45 K/UL
MONOCYTES NFR BLD AUTO: 6.9 %
NEUTROPHILS # BLD AUTO: 4.06 K/UL
NEUTROPHILS NFR BLD AUTO: 62.6 %
PLATELET # BLD AUTO: 238 K/UL
POTASSIUM SERPL-SCNC: 3.8 MMOL/L
PROT SERPL-MCNC: 7.4 G/DL
RBC # BLD: 4.51 M/UL
RBC # FLD: 13.5 %
SODIUM SERPL-SCNC: 142 MMOL/L
WBC # FLD AUTO: 6.48 K/UL

## 2019-03-22 PROCEDURE — 99214 OFFICE O/P EST MOD 30 MIN: CPT

## 2019-03-27 NOTE — ED PROVIDER NOTE - TOBACCO USE
DVT PPX:HSQ    - Advanced Directive: Pt is DNR/DNI, MOLST in chart    The patient is considered to have High risk for a intermediate risk surgery  pt is optimized as much possible for this time sensitive surgery and may proceed to OR Unknown if ever smoked

## 2019-04-03 ENCOUNTER — FORM ENCOUNTER (OUTPATIENT)
Age: 64
End: 2019-04-03

## 2019-04-04 ENCOUNTER — OUTPATIENT (OUTPATIENT)
Dept: OUTPATIENT SERVICES | Facility: HOSPITAL | Age: 64
LOS: 1 days | End: 2019-04-04
Payer: MEDICAID

## 2019-04-04 ENCOUNTER — APPOINTMENT (OUTPATIENT)
Dept: CT IMAGING | Facility: CLINIC | Age: 64
End: 2019-04-04
Payer: MEDICAID

## 2019-04-04 ENCOUNTER — APPOINTMENT (OUTPATIENT)
Dept: CT IMAGING | Facility: CLINIC | Age: 64
End: 2019-04-04

## 2019-04-04 DIAGNOSIS — J84.9 INTERSTITIAL PULMONARY DISEASE, UNSPECIFIED: ICD-10-CM

## 2019-04-04 DIAGNOSIS — M06.9 RHEUMATOID ARTHRITIS, UNSPECIFIED: ICD-10-CM

## 2019-04-04 PROCEDURE — 74176 CT ABD & PELVIS W/O CONTRAST: CPT | Mod: 26

## 2019-04-04 PROCEDURE — 71250 CT THORAX DX C-: CPT | Mod: 26

## 2019-04-04 PROCEDURE — 71250 CT THORAX DX C-: CPT

## 2019-04-04 PROCEDURE — 74176 CT ABD & PELVIS W/O CONTRAST: CPT

## 2019-06-05 ENCOUNTER — APPOINTMENT (OUTPATIENT)
Dept: PULMONOLOGY | Facility: CLINIC | Age: 64
End: 2019-06-05

## 2019-06-11 ENCOUNTER — APPOINTMENT (OUTPATIENT)
Dept: CARDIOLOGY | Facility: HOSPITAL | Age: 64
End: 2019-06-11

## 2019-06-21 ENCOUNTER — APPOINTMENT (OUTPATIENT)
Dept: RHEUMATOLOGY | Facility: CLINIC | Age: 64
End: 2019-06-21
Payer: MEDICAID

## 2019-06-21 VITALS
RESPIRATION RATE: 16 BRPM | BODY MASS INDEX: 28.13 KG/M2 | HEART RATE: 70 BPM | HEIGHT: 61 IN | TEMPERATURE: 98 F | DIASTOLIC BLOOD PRESSURE: 72 MMHG | WEIGHT: 149 LBS | OXYGEN SATURATION: 99 % | SYSTOLIC BLOOD PRESSURE: 116 MMHG

## 2019-06-21 DIAGNOSIS — M25.562 PAIN IN LEFT KNEE: ICD-10-CM

## 2019-06-21 PROCEDURE — 20610 DRAIN/INJ JOINT/BURSA W/O US: CPT | Mod: LT

## 2019-06-21 PROCEDURE — 99215 OFFICE O/P EST HI 40 MIN: CPT | Mod: 25

## 2019-06-21 RX ORDER — CHOLECALCIFEROL (VITAMIN D3) 1250 MCG
1.25 MG CAPSULE ORAL
Qty: 4 | Refills: 0 | Status: DISCONTINUED | COMMUNITY
Start: 2017-10-10 | End: 2019-06-21

## 2019-06-21 RX ORDER — METHYLPRED ACET/NACL,ISO-OS/PF 40 MG/ML
40 VIAL (ML) INJECTION
Qty: 1 | Refills: 0 | Status: COMPLETED | OUTPATIENT
Start: 2019-06-21

## 2019-06-21 RX ORDER — LIDOCAINE HYDROCHLORIDE 10 MG/ML
1 INJECTION, SOLUTION INFILTRATION; PERINEURAL
Refills: 0 | Status: COMPLETED | OUTPATIENT
Start: 2019-06-21

## 2019-06-21 RX ADMIN — LIDOCAINE HYDROCHLORIDE %: 10 INJECTION, SOLUTION INFILTRATION; PERINEURAL at 00:00

## 2019-06-21 RX ADMIN — METHYLPREDNISOLONE ACETATE 1 MG/ML: 40 INJECTION, SUSPENSION INTRA-ARTICULAR; INTRALESIONAL; INTRAMUSCULAR; SOFT TISSUE at 00:00

## 2019-06-24 LAB
25(OH)D3 SERPL-MCNC: 24.2 NG/ML
ALBUMIN SERPL ELPH-MCNC: 4.1 G/DL
ALP BLD-CCNC: 71 U/L
ALT SERPL-CCNC: 11 U/L
ANION GAP SERPL CALC-SCNC: 12 MMOL/L
AST SERPL-CCNC: 16 U/L
BASOPHILS # BLD AUTO: 0.03 K/UL
BASOPHILS NFR BLD AUTO: 0.5 %
BILIRUB SERPL-MCNC: 0.2 MG/DL
BUN SERPL-MCNC: 11 MG/DL
CALCIUM SERPL-MCNC: 9.4 MG/DL
CHLORIDE SERPL-SCNC: 106 MMOL/L
CO2 SERPL-SCNC: 23 MMOL/L
CREAT SERPL-MCNC: 0.68 MG/DL
CRP SERPL-MCNC: 0.11 MG/DL
EOSINOPHIL # BLD AUTO: 0.21 K/UL
EOSINOPHIL NFR BLD AUTO: 3.4 %
ERYTHROCYTE [SEDIMENTATION RATE] IN BLOOD BY WESTERGREN METHOD: 51 MM/HR
GLUCOSE SERPL-MCNC: 121 MG/DL
HAV IGM SER QL: NONREACTIVE
HBV CORE IGG+IGM SER QL: NONREACTIVE
HBV CORE IGM SER QL: NONREACTIVE
HBV SURFACE AG SER QL: NONREACTIVE
HCT VFR BLD CALC: 38.8 %
HCV AB SER QL: NONREACTIVE
HCV S/CO RATIO: 0.13 S/CO
HGB BLD-MCNC: 12.6 G/DL
IMM GRANULOCYTES NFR BLD AUTO: 0.3 %
LYMPHOCYTES # BLD AUTO: 2.15 K/UL
LYMPHOCYTES NFR BLD AUTO: 35.1 %
MAN DIFF?: NORMAL
MCHC RBC-ENTMCNC: 28.9 PG
MCHC RBC-ENTMCNC: 32.5 GM/DL
MCV RBC AUTO: 89 FL
MONOCYTES # BLD AUTO: 0.43 K/UL
MONOCYTES NFR BLD AUTO: 7 %
NEUTROPHILS # BLD AUTO: 3.28 K/UL
NEUTROPHILS NFR BLD AUTO: 53.7 %
PLATELET # BLD AUTO: 251 K/UL
POTASSIUM SERPL-SCNC: 4 MMOL/L
PROT SERPL-MCNC: 7.4 G/DL
RBC # BLD: 4.36 M/UL
RBC # FLD: 12.6 %
SODIUM SERPL-SCNC: 141 MMOL/L
WBC # FLD AUTO: 6.12 K/UL

## 2019-06-25 ENCOUNTER — APPOINTMENT (OUTPATIENT)
Dept: OPHTHALMOLOGY | Facility: CLINIC | Age: 64
End: 2019-06-25

## 2019-06-26 ENCOUNTER — APPOINTMENT (OUTPATIENT)
Dept: PULMONOLOGY | Facility: CLINIC | Age: 64
End: 2019-06-26
Payer: MEDICAID

## 2019-06-26 VITALS
SYSTOLIC BLOOD PRESSURE: 115 MMHG | RESPIRATION RATE: 17 BRPM | HEART RATE: 69 BPM | TEMPERATURE: 97.3 F | WEIGHT: 143 LBS | DIASTOLIC BLOOD PRESSURE: 74 MMHG | BODY MASS INDEX: 27 KG/M2 | HEIGHT: 61 IN | OXYGEN SATURATION: 98 %

## 2019-06-26 LAB
M TB IFN-G BLD-IMP: NEGATIVE
QUANTIFERON TB PLUS MITOGEN MINUS NIL: >10 IU/ML
QUANTIFERON TB PLUS NIL: 0.03 IU/ML
QUANTIFERON TB PLUS TB1 MINUS NIL: 0.03 IU/ML
QUANTIFERON TB PLUS TB2 MINUS NIL: 0.02 IU/ML

## 2019-06-26 PROCEDURE — 99214 OFFICE O/P EST MOD 30 MIN: CPT

## 2019-06-26 NOTE — REVIEW OF SYSTEMS
[Postnasal Drip] : postnasal drip [Myalgias] : myalgias [As Noted in HPI] : as noted in HPI [Heartburn] : heartburn [Headache] : headache [Depression] : depression [Arthralgias] : arthralgias [Negative] : Genitourinary/GYN

## 2019-06-26 NOTE — HISTORY OF PRESENT ILLNESS
[FreeTextEntry1] : Patient notes continued cough, occasional shortness of breath, and an unusual 'sponge feeling' in chest. Also notes fleeting pains in chest.

## 2019-06-26 NOTE — PHYSICAL EXAM
[General Appearance - Well Developed] : well developed [Well Groomed] : well groomed [Normal Appearance] : normal appearance [General Appearance - Well Nourished] : well nourished [No Deformities] : no deformities [Eyelids - No Xanthelasma] : the eyelids demonstrated no xanthelasmas [General Appearance - In No Acute Distress] : no acute distress [Normal Conjunctiva] : the conjunctiva exhibited no abnormalities [Normal Oropharynx] : normal oropharynx [Neck Appearance] : the appearance of the neck was normal [Neck Cervical Mass (___cm)] : no neck mass was observed [Jugular Venous Distention Increased] : there was no jugular-venous distention [Thyroid Diffuse Enlargement] : the thyroid was not enlarged [Thyroid Nodule] : there were no palpable thyroid nodules [Murmurs] : no murmurs present [Heart Sounds] : normal S1 and S2 [Heart Rate And Rhythm] : heart rate and rhythm were normal [Respiration, Rhythm And Depth] : normal respiratory rhythm and effort [Exaggerated Use Of Accessory Muscles For Inspiration] : no accessory muscle use [Abdomen Soft] : soft [Abdomen Tenderness] : non-tender [Auscultation Breath Sounds / Voice Sounds] : lungs were clear to auscultation bilaterally [Abdomen Mass (___ Cm)] : no abdominal mass palpated [Abnormal Walk] : normal gait [Cyanosis, Localized] : no localized cyanosis [Gait - Sufficient For Exercise Testing] : the gait was sufficient for exercise testing [Nail Clubbing] : no clubbing of the fingernails [Petechial Hemorrhages (___cm)] : no petechial hemorrhages [] : no ischemic changes

## 2019-06-26 NOTE — ASSESSMENT
[FreeTextEntry1] : 1. ILD: Secondary to RA. PFTs were normal when last checked. CT from 4/2019 was worse than 2016. Will continue to observe for now. Repeat PFTS in 3 months.

## 2019-07-11 ENCOUNTER — RX RENEWAL (OUTPATIENT)
Age: 64
End: 2019-07-11

## 2019-07-23 ENCOUNTER — APPOINTMENT (OUTPATIENT)
Dept: CARDIOLOGY | Facility: HOSPITAL | Age: 64
End: 2019-07-23

## 2019-07-23 ENCOUNTER — NON-APPOINTMENT (OUTPATIENT)
Age: 64
End: 2019-07-23

## 2019-07-23 VITALS
SYSTOLIC BLOOD PRESSURE: 119 MMHG | WEIGHT: 144 LBS | HEIGHT: 64 IN | RESPIRATION RATE: 16 BRPM | DIASTOLIC BLOOD PRESSURE: 78 MMHG | OXYGEN SATURATION: 96 % | BODY MASS INDEX: 24.59 KG/M2 | HEART RATE: 60 BPM

## 2019-07-24 ENCOUNTER — APPOINTMENT (OUTPATIENT)
Dept: OBGYN | Facility: CLINIC | Age: 64
End: 2019-07-24

## 2019-07-24 NOTE — PHYSICAL EXAM
[General Appearance - Well Developed] : well developed [General Appearance - Well Nourished] : well nourished [Normal Appearance] : normal appearance [Normal Oral Mucosa] : normal oral mucosa [Normal Oropharynx] : normal oropharynx [] : no respiratory distress [Respiration, Rhythm And Depth] : normal respiratory rhythm and effort [Heart Rate And Rhythm] : heart rate and rhythm were normal [Auscultation Breath Sounds / Voice Sounds] : lungs were clear to auscultation bilaterally [Heart Sounds] : normal S1 and S2 [Murmurs] : no murmurs present [Arterial Pulses Normal] : the arterial pulses were normal [Edema] : no peripheral edema present [Bowel Sounds] : normal bowel sounds [Skin Color & Pigmentation] : normal skin color and pigmentation [No Venous Stasis] : no venous stasis [Affect] : the affect was normal [FreeTextEntry1] : Joint deformities noted in bilateral hands

## 2019-07-24 NOTE — DISCUSSION/SUMMARY
[FreeTextEntry1] : 64F with RA presents with atypical chest pain.\par Prior work up includes Coronary Calcium score which was elevated, NST with no abnormalities. Subsequent TTE with normal EF and wall motion.\par Symptoms are atypical for classic angina. Potentially could be coronary spasm, coronary microvascular disease or GERD.\par \par - continue asa\par - continue atorvastatin 40 mg\par - start coreg 3.125 mg BID, uptitrate as tolerated\par - F/U in 3 months\par \par Discussed with Dr. Montes\par \par Omar Swanson MD

## 2019-07-24 NOTE — HISTORY OF PRESENT ILLNESS
[FreeTextEntry1] : 64 year old female with PMH of long-standing RA on DMARDs presents for follow up of chest pain. \par \par Of note previously, evaluated at [facility] ED with coronary CT, which showed calcium score of 470. She then had nuclear stress test, which showed perfusion defect with normal wall motion suggestive of attenuation with possible apical scar. Pt did not see a cardiologist during the hospital stay and was discharged. Her sx persisted and she was referred from rheumatology office for further cardiac evaluation.\par \par Pt describes her chest discomfort as pressure-like sensation across her chest with no radiation. Each episode lasts few seconds at a time and occurs randomly whether she's exerting herself or not. She has mild SOB as well with each episode but resting makes her sx better. Pt has been compliant with her RA medications and otherwise denied any new onset of symptoms. She also denied any recent changes in meds, recent travels, and sick contacts.\par \par Since visit March 5th, patient had repeat TTE with no abnormalities. Patient reports ongoing symptoms. Remain atypical. \par \par Cardiac studies:\par 11/27/2018 - Coronary CT\par Coronary arteries: \par Coronary artery calcium Agatston score:  \par Left main (LM) coronary artery:  0\par Left anterior descending (LAD) coronary artery:  371\par Left circumflex (LCX) coronary artery:  47\par Right coronary artery (RCA):  0\par Total:  418\par \par 11/27/2018 - Nuc stress test\par The left ventricle was small in size. There are mild\par defects in the distal anterior, apical, and distal\par inferolateral walls that are fixed and demonstrate\par preserved wall motion suggestive of attenuation artifact.\par As these defects did not correct with prone imaging, an\par area of scar in this territory cannot be excluded.\par Post-stress gated wall motion analysis was performed\par (LVEF > 70%;LVEDV = 49 ml.), revealing normal LV function.\par

## 2019-08-21 ENCOUNTER — APPOINTMENT (OUTPATIENT)
Dept: RHEUMATOLOGY | Facility: CLINIC | Age: 64
End: 2019-08-21

## 2019-09-04 ENCOUNTER — APPOINTMENT (OUTPATIENT)
Dept: PULMONOLOGY | Facility: CLINIC | Age: 64
End: 2019-09-04
Payer: MEDICAID

## 2019-09-04 PROCEDURE — 94729 DIFFUSING CAPACITY: CPT

## 2019-09-04 PROCEDURE — 94010 BREATHING CAPACITY TEST: CPT

## 2019-09-04 PROCEDURE — 94726 PLETHYSMOGRAPHY LUNG VOLUMES: CPT

## 2019-09-05 ENCOUNTER — APPOINTMENT (OUTPATIENT)
Dept: RHEUMATOLOGY | Facility: CLINIC | Age: 64
End: 2019-09-05
Payer: MEDICAID

## 2019-09-05 VITALS
SYSTOLIC BLOOD PRESSURE: 110 MMHG | OXYGEN SATURATION: 97 % | HEART RATE: 64 BPM | BODY MASS INDEX: 25.06 KG/M2 | WEIGHT: 146 LBS | TEMPERATURE: 98 F | DIASTOLIC BLOOD PRESSURE: 72 MMHG

## 2019-09-05 PROCEDURE — 20610 DRAIN/INJ JOINT/BURSA W/O US: CPT | Mod: LT

## 2019-09-05 PROCEDURE — 99215 OFFICE O/P EST HI 40 MIN: CPT | Mod: 25

## 2019-09-05 RX ORDER — LIDOCAINE HYDROCHLORIDE 10 MG/ML
1 INJECTION, SOLUTION INFILTRATION; PERINEURAL
Qty: 0 | Refills: 0 | Status: COMPLETED | OUTPATIENT
Start: 2019-09-05

## 2019-09-05 RX ORDER — METHYLPRED ACET/NACL,ISO-OS/PF 40 MG/ML
40 VIAL (ML) INJECTION
Qty: 1 | Refills: 0 | Status: COMPLETED | OUTPATIENT
Start: 2019-09-05

## 2019-09-05 RX ADMIN — METHYLPREDNISOLONE ACETATE 1 MG/ML: 40 INJECTION, SUSPENSION INTRA-ARTICULAR; INTRALESIONAL; INTRAMUSCULAR; SOFT TISSUE at 00:00

## 2019-09-05 RX ADMIN — LIDOCAINE HYDROCHLORIDE 0 %: 10 INJECTION, SOLUTION INFILTRATION; PERINEURAL at 00:00

## 2019-09-06 ENCOUNTER — OUTPATIENT (OUTPATIENT)
Dept: OUTPATIENT SERVICES | Facility: HOSPITAL | Age: 64
LOS: 1 days | End: 2019-09-06
Payer: MEDICAID

## 2019-09-06 ENCOUNTER — APPOINTMENT (OUTPATIENT)
Dept: MAMMOGRAPHY | Facility: IMAGING CENTER | Age: 64
End: 2019-09-06
Payer: MEDICAID

## 2019-09-06 ENCOUNTER — APPOINTMENT (OUTPATIENT)
Dept: ULTRASOUND IMAGING | Facility: IMAGING CENTER | Age: 64
End: 2019-09-06
Payer: MEDICAID

## 2019-09-06 DIAGNOSIS — Z00.8 ENCOUNTER FOR OTHER GENERAL EXAMINATION: ICD-10-CM

## 2019-09-06 PROCEDURE — G0279: CPT | Mod: 26

## 2019-09-06 PROCEDURE — 77066 DX MAMMO INCL CAD BI: CPT | Mod: 26

## 2019-09-06 PROCEDURE — 77066 DX MAMMO INCL CAD BI: CPT

## 2019-09-06 PROCEDURE — 76641 ULTRASOUND BREAST COMPLETE: CPT

## 2019-09-06 PROCEDURE — G0279: CPT

## 2019-09-07 PROCEDURE — 76641 ULTRASOUND BREAST COMPLETE: CPT | Mod: 26,50

## 2019-09-11 ENCOUNTER — APPOINTMENT (OUTPATIENT)
Dept: PULMONOLOGY | Facility: CLINIC | Age: 64
End: 2019-09-11
Payer: MEDICAID

## 2019-09-11 VITALS
RESPIRATION RATE: 17 BRPM | DIASTOLIC BLOOD PRESSURE: 80 MMHG | BODY MASS INDEX: 24.75 KG/M2 | OXYGEN SATURATION: 98 % | HEIGHT: 64 IN | WEIGHT: 145 LBS | SYSTOLIC BLOOD PRESSURE: 122 MMHG | TEMPERATURE: 97.5 F | HEART RATE: 65 BPM

## 2019-09-11 PROCEDURE — 99214 OFFICE O/P EST MOD 30 MIN: CPT

## 2019-09-11 NOTE — PHYSICAL EXAM
[Normal Appearance] : normal appearance [General Appearance - Well Developed] : well developed [Well Groomed] : well groomed [General Appearance - Well Nourished] : well nourished [No Deformities] : no deformities [General Appearance - In No Acute Distress] : no acute distress [Normal Conjunctiva] : the conjunctiva exhibited no abnormalities [Eyelids - No Xanthelasma] : the eyelids demonstrated no xanthelasmas [Normal Oropharynx] : normal oropharynx [Neck Cervical Mass (___cm)] : no neck mass was observed [Neck Appearance] : the appearance of the neck was normal [Thyroid Diffuse Enlargement] : the thyroid was not enlarged [Jugular Venous Distention Increased] : there was no jugular-venous distention [Heart Rate And Rhythm] : heart rate and rhythm were normal [Thyroid Nodule] : there were no palpable thyroid nodules [Heart Sounds] : normal S1 and S2 [Murmurs] : no murmurs present [Respiration, Rhythm And Depth] : normal respiratory rhythm and effort [Exaggerated Use Of Accessory Muscles For Inspiration] : no accessory muscle use [Auscultation Breath Sounds / Voice Sounds] : lungs were clear to auscultation bilaterally [Abdomen Soft] : soft [Abdomen Tenderness] : non-tender [Abdomen Mass (___ Cm)] : no abdominal mass palpated [Abnormal Walk] : normal gait [Nail Clubbing] : no clubbing of the fingernails [Gait - Sufficient For Exercise Testing] : the gait was sufficient for exercise testing [Cyanosis, Localized] : no localized cyanosis [] : no ischemic changes [Petechial Hemorrhages (___cm)] : no petechial hemorrhages

## 2019-09-11 NOTE — END OF VISIT
[FreeTextEntry3] : I directly supervised nurse practitioner Reji Mahan and was present during key points of his history and physical. I agree with his history, physical and assessment.\par

## 2019-09-11 NOTE — HISTORY OF PRESENT ILLNESS
[FreeTextEntry1] : Patient notes improved cough, occasional shortness of breath. She notes dyspnea mostly on exertion, when walking more than 4 blocks or going up stairs. She also notes it at time while she's lying down. She states that it has stayed the same since last visit.\par \par PFT today is normal, no change from prior PFT.

## 2019-09-11 NOTE — REVIEW OF SYSTEMS
[Postnasal Drip] : postnasal drip [As Noted in HPI] : as noted in HPI [Heartburn] : heartburn [Arthralgias] : arthralgias [Myalgias] : myalgias [Headache] : headache [Depression] : depression [Negative] : Dermatologic

## 2019-09-11 NOTE — ASSESSMENT
[FreeTextEntry1] : 1. ILD: Secondary to RA. PFT on 9/4 same as March 2019, no decline in function, PFTs normal. Symptoms stable. Will continue to monitor, return to office in 6 months. Patient instructed to call office if dyspnea or cough worsen prior to next visit.

## 2019-10-07 ENCOUNTER — RX RENEWAL (OUTPATIENT)
Age: 64
End: 2019-10-07

## 2019-10-29 ENCOUNTER — APPOINTMENT (OUTPATIENT)
Dept: CARDIOLOGY | Facility: HOSPITAL | Age: 64
End: 2019-10-29

## 2019-12-01 ENCOUNTER — RX RENEWAL (OUTPATIENT)
Age: 64
End: 2019-12-01

## 2019-12-05 ENCOUNTER — APPOINTMENT (OUTPATIENT)
Dept: RHEUMATOLOGY | Facility: CLINIC | Age: 64
End: 2019-12-05

## 2020-02-20 ENCOUNTER — APPOINTMENT (OUTPATIENT)
Dept: OBGYN | Facility: CLINIC | Age: 65
End: 2020-02-20

## 2020-03-18 ENCOUNTER — RX RENEWAL (OUTPATIENT)
Age: 65
End: 2020-03-18

## 2020-03-30 ENCOUNTER — APPOINTMENT (OUTPATIENT)
Dept: PULMONOLOGY | Facility: CLINIC | Age: 65
End: 2020-03-30

## 2020-03-31 ENCOUNTER — APPOINTMENT (OUTPATIENT)
Dept: OPHTHALMOLOGY | Facility: CLINIC | Age: 65
End: 2020-03-31

## 2020-05-20 ENCOUNTER — APPOINTMENT (OUTPATIENT)
Dept: RHEUMATOLOGY | Facility: CLINIC | Age: 65
End: 2020-05-20

## 2020-07-06 ENCOUNTER — RX RENEWAL (OUTPATIENT)
Age: 65
End: 2020-07-06

## 2020-10-14 ENCOUNTER — APPOINTMENT (OUTPATIENT)
Dept: ULTRASOUND IMAGING | Facility: CLINIC | Age: 65
End: 2020-10-14
Payer: MEDICARE

## 2020-10-14 ENCOUNTER — OUTPATIENT (OUTPATIENT)
Dept: OUTPATIENT SERVICES | Facility: HOSPITAL | Age: 65
LOS: 1 days | End: 2020-10-14
Payer: COMMERCIAL

## 2020-10-14 DIAGNOSIS — Z00.8 ENCOUNTER FOR OTHER GENERAL EXAMINATION: ICD-10-CM

## 2020-10-14 PROCEDURE — 76700 US EXAM ABDOM COMPLETE: CPT | Mod: 26

## 2020-10-14 PROCEDURE — 76700 US EXAM ABDOM COMPLETE: CPT

## 2020-10-28 ENCOUNTER — APPOINTMENT (OUTPATIENT)
Dept: RHEUMATOLOGY | Facility: CLINIC | Age: 65
End: 2020-10-28
Payer: MEDICARE

## 2020-10-28 VITALS
SYSTOLIC BLOOD PRESSURE: 121 MMHG | DIASTOLIC BLOOD PRESSURE: 79 MMHG | TEMPERATURE: 97.9 F | WEIGHT: 153 LBS | HEART RATE: 66 BPM | BODY MASS INDEX: 26.12 KG/M2 | OXYGEN SATURATION: 98 % | RESPIRATION RATE: 16 BRPM | HEIGHT: 64 IN

## 2020-10-28 PROCEDURE — 99215 OFFICE O/P EST HI 40 MIN: CPT | Mod: 25

## 2020-10-28 PROCEDURE — 99072 ADDL SUPL MATRL&STAF TM PHE: CPT

## 2020-10-28 RX ORDER — PANTOPRAZOLE 40 MG/1
40 TABLET, DELAYED RELEASE ORAL DAILY
Qty: 30 | Refills: 5 | Status: DISCONTINUED | COMMUNITY
Start: 2019-03-18 | End: 2020-10-28

## 2020-10-29 LAB
25(OH)D3 SERPL-MCNC: 49.9 NG/ML
ALBUMIN SERPL ELPH-MCNC: 4.6 G/DL
ALP BLD-CCNC: 65 U/L
ALT SERPL-CCNC: 13 U/L
ANION GAP SERPL CALC-SCNC: 13 MMOL/L
AST SERPL-CCNC: 18 U/L
BASOPHILS # BLD AUTO: 0.03 K/UL
BASOPHILS NFR BLD AUTO: 0.5 %
BILIRUB SERPL-MCNC: 0.3 MG/DL
BUN SERPL-MCNC: 11 MG/DL
CALCIUM SERPL-MCNC: 9.6 MG/DL
CELLS.CD3-CD19+/CELLS IN BLOOD: 10 %
CHLORIDE SERPL-SCNC: 103 MMOL/L
CO2 SERPL-SCNC: 26 MMOL/L
CREAT SERPL-MCNC: 0.74 MG/DL
CRP SERPL-MCNC: <0.1 MG/DL
EOSINOPHIL # BLD AUTO: 0.2 K/UL
EOSINOPHIL NFR BLD AUTO: 3.2 %
ERYTHROCYTE [SEDIMENTATION RATE] IN BLOOD BY WESTERGREN METHOD: 37 MM/HR
ESTIMATED AVERAGE GLUCOSE: 111 MG/DL
FOLATE SERPL-MCNC: >20 NG/ML
GLUCOSE SERPL-MCNC: 82 MG/DL
HAV IGM SER QL: NONREACTIVE
HBA1C MFR BLD HPLC: 5.5 %
HBV CORE IGG+IGM SER QL: NONREACTIVE
HBV CORE IGM SER QL: NONREACTIVE
HBV SURFACE AG SER QL: NONREACTIVE
HCT VFR BLD CALC: 41.9 %
HCV AB SER QL: NONREACTIVE
HCV S/CO RATIO: 0.11 S/CO
HGB BLD-MCNC: 13.2 G/DL
IGA SER QL IEP: 581 MG/DL
IGG SER QL IEP: 1576 MG/DL
IGM SER QL IEP: 59 MG/DL
IMM GRANULOCYTES NFR BLD AUTO: 0.3 %
LYMPHOCYTES # BLD AUTO: 1.9 K/UL
LYMPHOCYTES NFR BLD AUTO: 30.4 %
MAN DIFF?: NORMAL
MCHC RBC-ENTMCNC: 29.7 PG
MCHC RBC-ENTMCNC: 31.5 GM/DL
MCV RBC AUTO: 94.2 FL
MONOCYTES # BLD AUTO: 0.45 K/UL
MONOCYTES NFR BLD AUTO: 7.2 %
NEUTROPHILS # BLD AUTO: 3.66 K/UL
NEUTROPHILS NFR BLD AUTO: 58.4 %
PLATELET # BLD AUTO: 237 K/UL
POTASSIUM SERPL-SCNC: 4.2 MMOL/L
PROT SERPL-MCNC: 7.6 G/DL
RBC # BLD: 4.45 M/UL
RBC # FLD: 12.9 %
SODIUM SERPL-SCNC: 141 MMOL/L
VIT B12 SERPL-MCNC: 446 PG/ML
WBC # FLD AUTO: 6.26 K/UL

## 2020-11-02 LAB
M TB IFN-G BLD-IMP: NEGATIVE
QUANTIFERON TB PLUS MITOGEN MINUS NIL: 8.43 IU/ML
QUANTIFERON TB PLUS NIL: 0.03 IU/ML
QUANTIFERON TB PLUS TB1 MINUS NIL: 0.02 IU/ML
QUANTIFERON TB PLUS TB2 MINUS NIL: 0.02 IU/ML

## 2020-11-19 ENCOUNTER — RESULT REVIEW (OUTPATIENT)
Age: 65
End: 2020-11-19

## 2020-11-19 ENCOUNTER — APPOINTMENT (OUTPATIENT)
Dept: RADIOLOGY | Facility: IMAGING CENTER | Age: 65
End: 2020-11-19
Payer: MEDICARE

## 2020-11-19 ENCOUNTER — OUTPATIENT (OUTPATIENT)
Dept: OUTPATIENT SERVICES | Facility: HOSPITAL | Age: 65
LOS: 1 days | End: 2020-11-19
Payer: COMMERCIAL

## 2020-11-19 ENCOUNTER — APPOINTMENT (OUTPATIENT)
Dept: CT IMAGING | Facility: IMAGING CENTER | Age: 65
End: 2020-11-19
Payer: MEDICARE

## 2020-11-19 DIAGNOSIS — G62.9 POLYNEUROPATHY, UNSPECIFIED: ICD-10-CM

## 2020-11-19 DIAGNOSIS — Z00.8 ENCOUNTER FOR OTHER GENERAL EXAMINATION: ICD-10-CM

## 2020-11-19 DIAGNOSIS — J84.9 INTERSTITIAL PULMONARY DISEASE, UNSPECIFIED: ICD-10-CM

## 2020-11-19 DIAGNOSIS — M06.9 RHEUMATOID ARTHRITIS, UNSPECIFIED: ICD-10-CM

## 2020-11-19 PROCEDURE — 72040 X-RAY EXAM NECK SPINE 2-3 VW: CPT | Mod: 26

## 2020-11-19 PROCEDURE — 72040 X-RAY EXAM NECK SPINE 2-3 VW: CPT

## 2020-11-19 PROCEDURE — 73110 X-RAY EXAM OF WRIST: CPT | Mod: 26,LT,RT

## 2020-11-19 PROCEDURE — 73630 X-RAY EXAM OF FOOT: CPT | Mod: 26,LT,RT

## 2020-11-19 PROCEDURE — 77080 DXA BONE DENSITY AXIAL: CPT

## 2020-11-19 PROCEDURE — 73130 X-RAY EXAM OF HAND: CPT | Mod: 26,LT,RT

## 2020-11-19 PROCEDURE — 77080 DXA BONE DENSITY AXIAL: CPT | Mod: 26

## 2020-11-19 PROCEDURE — 71250 CT THORAX DX C-: CPT | Mod: 26

## 2020-11-19 PROCEDURE — 73610 X-RAY EXAM OF ANKLE: CPT

## 2020-11-19 PROCEDURE — 73610 X-RAY EXAM OF ANKLE: CPT | Mod: 26,LT,RT

## 2020-11-19 PROCEDURE — 73130 X-RAY EXAM OF HAND: CPT

## 2020-11-19 PROCEDURE — 73110 X-RAY EXAM OF WRIST: CPT

## 2020-11-19 PROCEDURE — 73630 X-RAY EXAM OF FOOT: CPT

## 2020-11-19 PROCEDURE — 71250 CT THORAX DX C-: CPT

## 2020-11-20 ENCOUNTER — APPOINTMENT (OUTPATIENT)
Dept: DISASTER EMERGENCY | Facility: CLINIC | Age: 65
End: 2020-11-20

## 2020-11-21 LAB — SARS-COV-2 N GENE NPH QL NAA+PROBE: NOT DETECTED

## 2020-11-23 ENCOUNTER — APPOINTMENT (OUTPATIENT)
Dept: PULMONOLOGY | Facility: CLINIC | Age: 65
End: 2020-11-23
Payer: MEDICARE

## 2020-11-23 VITALS
RESPIRATION RATE: 18 BRPM | WEIGHT: 131 LBS | DIASTOLIC BLOOD PRESSURE: 82 MMHG | OXYGEN SATURATION: 98 % | TEMPERATURE: 97.3 F | BODY MASS INDEX: 24.73 KG/M2 | SYSTOLIC BLOOD PRESSURE: 154 MMHG | HEART RATE: 73 BPM | HEIGHT: 61 IN

## 2020-11-23 PROCEDURE — 94729 DIFFUSING CAPACITY: CPT

## 2020-11-23 PROCEDURE — 94010 BREATHING CAPACITY TEST: CPT

## 2020-11-23 PROCEDURE — ZZZZZ: CPT

## 2020-11-23 PROCEDURE — 94726 PLETHYSMOGRAPHY LUNG VOLUMES: CPT

## 2020-11-23 PROCEDURE — 99214 OFFICE O/P EST MOD 30 MIN: CPT | Mod: 25

## 2020-11-23 NOTE — ASSESSMENT
[FreeTextEntry1] : 1. ILD: PFTs and CT scan are unchanged. PFTs actually normal. No specific treatment needed. Patient advised to f/u with cardiology for chest pain. Continue albuterol prn for possible mild asthma. RTC 6 months

## 2020-11-23 NOTE — PHYSICAL EXAM
[No Acute Distress] : no acute distress [No Neck Mass] : no neck mass [Normal Rate/Rhythm] : normal rate/rhythm [Normal S1, S2] : normal s1, s2 [No Murmurs] : no murmurs [No Resp Distress] : no resp distress [Clear to Auscultation Bilaterally] : clear to auscultation bilaterally [No Abnormalities] : no abnormalities [Benign] : benign [Normal Gait] : normal gait [No Clubbing] : no clubbing [No Cyanosis] : no cyanosis [No Edema] : no edema [FROM] : FROM [Normal Color/ Pigmentation] : normal color/ pigmentation [No Focal Deficits] : no focal deficits [Oriented x3] : oriented x3 [Normal Affect] : normal affect [General Appearance - Well Developed] : well developed [Normal Appearance] : normal appearance [Well Groomed] : well groomed [General Appearance - Well Nourished] : well nourished [No Deformities] : no deformities [General Appearance - In No Acute Distress] : no acute distress [Normal Conjunctiva] : the conjunctiva exhibited no abnormalities [Eyelids - No Xanthelasma] : the eyelids demonstrated no xanthelasmas [Normal Oropharynx] : normal oropharynx [Neck Appearance] : the appearance of the neck was normal [Neck Cervical Mass (___cm)] : no neck mass was observed [Jugular Venous Distention Increased] : there was no jugular-venous distention [Thyroid Diffuse Enlargement] : the thyroid was not enlarged [Thyroid Nodule] : there were no palpable thyroid nodules [Heart Rate And Rhythm] : heart rate and rhythm were normal [Heart Sounds] : normal S1 and S2 [Murmurs] : no murmurs present [Respiration, Rhythm And Depth] : normal respiratory rhythm and effort [Exaggerated Use Of Accessory Muscles For Inspiration] : no accessory muscle use [Auscultation Breath Sounds / Voice Sounds] : lungs were clear to auscultation bilaterally [Abdomen Soft] : soft [Abdomen Tenderness] : non-tender [Abdomen Mass (___ Cm)] : no abdominal mass palpated [Abnormal Walk] : normal gait [Gait - Sufficient For Exercise Testing] : the gait was sufficient for exercise testing [Nail Clubbing] : no clubbing of the fingernails [Cyanosis, Localized] : no localized cyanosis [Petechial Hemorrhages (___cm)] : no petechial hemorrhages [] : no ischemic changes

## 2020-11-23 NOTE — HISTORY OF PRESENT ILLNESS
[FreeTextEntry1] : Patient notes continued cough, occasional shortness of breath, and an unusual 'sponge feeling' in chest. Also notes fleeting pains in chest. Some worsening of symptoms since last visit. Feels a bit depressed. Notes some chest pain, cough, dyspnea, occasional wheeze, difficulty sleeping.

## 2020-11-23 NOTE — REVIEW OF SYSTEMS
[Fatigue] : fatigue [Chest Tightness] : chest tightness [Pleuritic Pain] : pleuritic pain [Postnasal Drip] : postnasal drip [As Noted in HPI] : as noted in HPI [Heartburn] : heartburn [Myalgias] : myalgias [Arthralgias] : arthralgias [Headache] : headache [Depression] : depression [Negative] : Dermatologic

## 2020-11-24 ENCOUNTER — TRANSCRIPTION ENCOUNTER (OUTPATIENT)
Age: 65
End: 2020-11-24

## 2020-11-25 ENCOUNTER — OUTPATIENT (OUTPATIENT)
Dept: OUTPATIENT SERVICES | Facility: HOSPITAL | Age: 65
LOS: 1 days | End: 2020-11-25
Payer: MEDICAID

## 2020-11-25 ENCOUNTER — APPOINTMENT (OUTPATIENT)
Dept: ULTRASOUND IMAGING | Facility: IMAGING CENTER | Age: 65
End: 2020-11-25
Payer: MEDICARE

## 2020-11-25 ENCOUNTER — APPOINTMENT (OUTPATIENT)
Dept: MAMMOGRAPHY | Facility: IMAGING CENTER | Age: 65
End: 2020-11-25
Payer: MEDICARE

## 2020-11-25 DIAGNOSIS — Z00.8 ENCOUNTER FOR OTHER GENERAL EXAMINATION: ICD-10-CM

## 2020-11-25 PROCEDURE — 77067 SCR MAMMO BI INCL CAD: CPT | Mod: 26

## 2020-11-25 PROCEDURE — 77067 SCR MAMMO BI INCL CAD: CPT

## 2020-11-25 PROCEDURE — 77063 BREAST TOMOSYNTHESIS BI: CPT | Mod: 26

## 2020-11-25 PROCEDURE — 77063 BREAST TOMOSYNTHESIS BI: CPT

## 2020-12-15 ENCOUNTER — APPOINTMENT (OUTPATIENT)
Dept: GASTROENTEROLOGY | Facility: CLINIC | Age: 65
End: 2020-12-15

## 2020-12-18 ENCOUNTER — NON-APPOINTMENT (OUTPATIENT)
Age: 65
End: 2020-12-18

## 2021-01-12 ENCOUNTER — APPOINTMENT (OUTPATIENT)
Dept: OPHTHALMOLOGY | Facility: CLINIC | Age: 66
End: 2021-01-12

## 2021-01-15 ENCOUNTER — APPOINTMENT (OUTPATIENT)
Dept: RHEUMATOLOGY | Facility: CLINIC | Age: 66
End: 2021-01-15
Payer: MEDICARE

## 2021-01-15 VITALS
SYSTOLIC BLOOD PRESSURE: 126 MMHG | WEIGHT: 131 LBS | HEIGHT: 61 IN | DIASTOLIC BLOOD PRESSURE: 78 MMHG | HEART RATE: 76 BPM | TEMPERATURE: 97.7 F | RESPIRATION RATE: 17 BRPM | OXYGEN SATURATION: 98 % | BODY MASS INDEX: 24.73 KG/M2

## 2021-01-15 DIAGNOSIS — Z00.00 ENCOUNTER FOR GENERAL ADULT MEDICAL EXAMINATION W/OUT ABNORMAL FINDINGS: ICD-10-CM

## 2021-01-15 PROCEDURE — 99072 ADDL SUPL MATRL&STAF TM PHE: CPT

## 2021-01-15 PROCEDURE — 99215 OFFICE O/P EST HI 40 MIN: CPT

## 2021-01-19 ENCOUNTER — RX RENEWAL (OUTPATIENT)
Age: 66
End: 2021-01-19

## 2021-01-19 LAB
25(OH)D3 SERPL-MCNC: 59.7 NG/ML
ALBUMIN SERPL ELPH-MCNC: 4.5 G/DL
ALP BLD-CCNC: 62 U/L
ALT SERPL-CCNC: 17 U/L
ANION GAP SERPL CALC-SCNC: 14 MMOL/L
AST SERPL-CCNC: 22 U/L
BASOPHILS # BLD AUTO: 0.04 K/UL
BASOPHILS NFR BLD AUTO: 0.6 %
BILIRUB SERPL-MCNC: 0.3 MG/DL
BUN SERPL-MCNC: 15 MG/DL
CALCIUM SERPL-MCNC: 9.9 MG/DL
CHLORIDE SERPL-SCNC: 100 MMOL/L
CO2 SERPL-SCNC: 26 MMOL/L
CREAT SERPL-MCNC: 0.85 MG/DL
CRP SERPL-MCNC: <0.1 MG/DL
EOSINOPHIL # BLD AUTO: 0.16 K/UL
EOSINOPHIL NFR BLD AUTO: 2.4 %
ERYTHROCYTE [SEDIMENTATION RATE] IN BLOOD BY WESTERGREN METHOD: 41 MM/HR
GLUCOSE SERPL-MCNC: 84 MG/DL
HCT VFR BLD CALC: 40.7 %
HGB BLD-MCNC: 13 G/DL
IMM GRANULOCYTES NFR BLD AUTO: 0.3 %
LYMPHOCYTES # BLD AUTO: 2.27 K/UL
LYMPHOCYTES NFR BLD AUTO: 33.9 %
MAN DIFF?: NORMAL
MCHC RBC-ENTMCNC: 29.2 PG
MCHC RBC-ENTMCNC: 31.9 GM/DL
MCV RBC AUTO: 91.5 FL
MONOCYTES # BLD AUTO: 0.5 K/UL
MONOCYTES NFR BLD AUTO: 7.5 %
NEUTROPHILS # BLD AUTO: 3.71 K/UL
NEUTROPHILS NFR BLD AUTO: 55.3 %
PLATELET # BLD AUTO: 256 K/UL
POTASSIUM SERPL-SCNC: 4.1 MMOL/L
PROT SERPL-MCNC: 7.3 G/DL
RBC # BLD: 4.45 M/UL
RBC # FLD: 12.9 %
SODIUM SERPL-SCNC: 141 MMOL/L
WBC # FLD AUTO: 6.7 K/UL

## 2021-01-29 ENCOUNTER — APPOINTMENT (OUTPATIENT)
Dept: GASTROENTEROLOGY | Facility: CLINIC | Age: 66
End: 2021-01-29

## 2021-01-31 ENCOUNTER — RX RENEWAL (OUTPATIENT)
Age: 66
End: 2021-01-31

## 2021-02-11 ENCOUNTER — APPOINTMENT (OUTPATIENT)
Dept: CT IMAGING | Facility: CLINIC | Age: 66
End: 2021-02-11
Payer: MEDICARE

## 2021-02-11 ENCOUNTER — OUTPATIENT (OUTPATIENT)
Dept: OUTPATIENT SERVICES | Facility: HOSPITAL | Age: 66
LOS: 1 days | End: 2021-02-11
Payer: MEDICARE

## 2021-02-11 DIAGNOSIS — M06.9 RHEUMATOID ARTHRITIS, UNSPECIFIED: ICD-10-CM

## 2021-02-11 PROCEDURE — 74176 CT ABD & PELVIS W/O CONTRAST: CPT

## 2021-02-11 PROCEDURE — 74176 CT ABD & PELVIS W/O CONTRAST: CPT | Mod: 26

## 2021-02-20 ENCOUNTER — APPOINTMENT (OUTPATIENT)
Dept: RHEUMATOLOGY | Facility: CLINIC | Age: 66
End: 2021-02-20

## 2021-02-24 ENCOUNTER — APPOINTMENT (OUTPATIENT)
Dept: INTERNAL MEDICINE | Facility: CLINIC | Age: 66
End: 2021-02-24
Payer: MEDICARE

## 2021-02-24 VITALS
WEIGHT: 132 LBS | HEART RATE: 99 BPM | BODY MASS INDEX: 24.92 KG/M2 | DIASTOLIC BLOOD PRESSURE: 75 MMHG | SYSTOLIC BLOOD PRESSURE: 126 MMHG | HEIGHT: 61 IN | TEMPERATURE: 98 F | OXYGEN SATURATION: 98 %

## 2021-02-24 VITALS — DIASTOLIC BLOOD PRESSURE: 72 MMHG | SYSTOLIC BLOOD PRESSURE: 122 MMHG

## 2021-02-24 DIAGNOSIS — R21 RASH AND OTHER NONSPECIFIC SKIN ERUPTION: ICD-10-CM

## 2021-02-24 DIAGNOSIS — R05 COUGH: ICD-10-CM

## 2021-02-24 DIAGNOSIS — Z87.898 PERSONAL HISTORY OF OTHER SPECIFIED CONDITIONS: ICD-10-CM

## 2021-02-24 DIAGNOSIS — Z87.19 PERSONAL HISTORY OF OTHER DISEASES OF THE DIGESTIVE SYSTEM: ICD-10-CM

## 2021-02-24 DIAGNOSIS — Z01.818 ENCOUNTER FOR OTHER PREPROCEDURAL EXAMINATION: ICD-10-CM

## 2021-02-24 PROCEDURE — 99072 ADDL SUPL MATRL&STAF TM PHE: CPT

## 2021-02-24 PROCEDURE — 99213 OFFICE O/P EST LOW 20 MIN: CPT | Mod: 25

## 2021-02-24 PROCEDURE — G0439: CPT

## 2021-02-24 RX ORDER — ZOSTER VACCINE RECOMBINANT, ADJUVANTED 50 MCG/0.5
50 KIT INTRAMUSCULAR
Qty: 1 | Refills: 0 | Status: COMPLETED | COMMUNITY
Start: 2021-01-15 | End: 2021-02-24

## 2021-02-24 RX ORDER — RANITIDINE HYDROCHLORIDE 150 MG/1
150 CAPSULE ORAL
Qty: 60 | Refills: 2 | Status: COMPLETED | COMMUNITY
Start: 2018-09-04 | End: 2019-02-24

## 2021-02-24 RX ORDER — ZOSTER VACCINE RECOMBINANT, ADJUVANTED 50 MCG/0.5
50 KIT INTRAMUSCULAR
Qty: 1 | Refills: 0 | Status: COMPLETED | COMMUNITY
Start: 2020-10-28 | End: 2021-02-24

## 2021-02-24 RX ORDER — CYCLOBENZAPRINE HYDROCHLORIDE 5 MG/1
5 TABLET, FILM COATED ORAL
Qty: 10 | Refills: 0 | Status: COMPLETED | COMMUNITY
Start: 2019-06-21 | End: 2020-02-24

## 2021-02-24 NOTE — HISTORY OF PRESENT ILLNESS
[de-identified] : 67 yo woman\par \par Chest pain since Oct 2018, ED \par 27Nov2018 [facility]      LCX  47   total score  418\par 27Nov2018   Nuclear stress perfusion defect  defect distal ant, apical  and distal inferolateral FIXED  preserved wall motion = ARTIFACT   \par area of apical SCAR CANNOT BE EXCLUDED  Post stress gated wall motion analysis NORMAL LV FN\par \par Chest pressure seconds\par unrelated to exertion\par \par 67 yo RA with ILD   CT pulm fibrosis no change from 4April 2019  CORONARY A CA++\par But PFT 23 Nov 2020 NORMAL STUDY\par Up to date Prev and pneumo 23  and FLU\par \par NEEDS TdaP\par Last chest pain 2 weeks ago. Stopped Coreg on her own 2019 because she felt fine\par \par Osetopenia Zoledronic

## 2021-03-15 ENCOUNTER — APPOINTMENT (OUTPATIENT)
Dept: RHEUMATOLOGY | Facility: CLINIC | Age: 66
End: 2021-03-15
Payer: MEDICARE

## 2021-03-15 PROCEDURE — 99072 ADDL SUPL MATRL&STAF TM PHE: CPT

## 2021-03-15 PROCEDURE — 96374 THER/PROPH/DIAG INJ IV PUSH: CPT

## 2021-03-23 ENCOUNTER — APPOINTMENT (OUTPATIENT)
Dept: GASTROENTEROLOGY | Facility: CLINIC | Age: 66
End: 2021-03-23

## 2021-04-12 ENCOUNTER — APPOINTMENT (OUTPATIENT)
Dept: RHEUMATOLOGY | Facility: CLINIC | Age: 66
End: 2021-04-12

## 2021-04-16 PROBLEM — Z00.00 ENCOUNTER FOR PREVENTIVE HEALTH EXAMINATION: Noted: 2021-04-16

## 2021-04-19 ENCOUNTER — APPOINTMENT (OUTPATIENT)
Dept: INTERNAL MEDICINE | Facility: CLINIC | Age: 66
End: 2021-04-19
Payer: MEDICARE

## 2021-04-19 VITALS
HEIGHT: 61 IN | DIASTOLIC BLOOD PRESSURE: 87 MMHG | HEART RATE: 68 BPM | TEMPERATURE: 97.7 F | BODY MASS INDEX: 24.92 KG/M2 | WEIGHT: 132 LBS | OXYGEN SATURATION: 98 % | SYSTOLIC BLOOD PRESSURE: 138 MMHG

## 2021-04-19 VITALS — DIASTOLIC BLOOD PRESSURE: 74 MMHG | SYSTOLIC BLOOD PRESSURE: 126 MMHG

## 2021-04-19 DIAGNOSIS — K21.00 GASTRO-ESOPHAGEAL REFLUX DISEASE WITH ESOPHAGITIS, WITHOUT BLEEDING: ICD-10-CM

## 2021-04-19 PROCEDURE — 99214 OFFICE O/P EST MOD 30 MIN: CPT | Mod: 25

## 2021-04-19 PROCEDURE — 99072 ADDL SUPL MATRL&STAF TM PHE: CPT

## 2021-04-19 PROCEDURE — 90715 TDAP VACCINE 7 YRS/> IM: CPT

## 2021-04-19 PROCEDURE — 90471 IMMUNIZATION ADMIN: CPT

## 2021-04-19 RX ORDER — OFLOXACIN OTIC 3 MG/ML
0.3 SOLUTION AURICULAR (OTIC)
Qty: 10 | Refills: 0 | Status: COMPLETED | COMMUNITY
Start: 2018-07-10 | End: 2021-04-19

## 2021-04-19 RX ORDER — PANTOPRAZOLE 40 MG/1
40 TABLET, DELAYED RELEASE ORAL
Qty: 30 | Refills: 5 | Status: COMPLETED | COMMUNITY
Start: 2019-10-07 | End: 2021-04-19

## 2021-04-19 NOTE — REVIEW OF SYSTEMS
[Fever] : no fever [Chest Pain] : no chest pain [Leg Claudication] : no leg claudication [Orthopnea] : no orthopnea [Shortness Of Breath] : no shortness of breath [Wheezing] : no wheezing [Cough] : no cough [Headache] : no headache [Dizziness] : no dizziness [Fainting] : no fainting [Confusion] : no confusion

## 2021-04-19 NOTE — HISTORY OF PRESENT ILLNESS
[FreeTextEntry1] : F/U  [de-identified] : 65 yo normal PFT  nonocclusive CAD\par RA\par TdaP TODAY\par NEED SHINGRIX\par \par Seeing CARDIO May 2021. No further CP\par Reflux\par Heavy stomach  REFLUJO\par \par EGD  H PYLORI NEGATIVE\par SH moved here for daughter school  Lives w    daughter pregnant

## 2021-04-29 ENCOUNTER — EMERGENCY (EMERGENCY)
Facility: HOSPITAL | Age: 66
LOS: 1 days | Discharge: HOME | End: 2021-04-29
Attending: EMERGENCY MEDICINE
Payer: MEDICARE

## 2021-04-29 ENCOUNTER — NON-APPOINTMENT (OUTPATIENT)
Age: 66
End: 2021-04-29

## 2021-04-29 VITALS
SYSTOLIC BLOOD PRESSURE: 164 MMHG | RESPIRATION RATE: 18 BRPM | HEART RATE: 70 BPM | DIASTOLIC BLOOD PRESSURE: 86 MMHG | TEMPERATURE: 98 F | WEIGHT: 139.99 LBS | OXYGEN SATURATION: 97 % | HEIGHT: 64 IN

## 2021-04-29 VITALS
DIASTOLIC BLOOD PRESSURE: 83 MMHG | OXYGEN SATURATION: 99 % | TEMPERATURE: 98 F | RESPIRATION RATE: 19 BRPM | HEART RATE: 59 BPM | SYSTOLIC BLOOD PRESSURE: 120 MMHG

## 2021-04-29 PROCEDURE — 99284 EMERGENCY DEPT VISIT MOD MDM: CPT | Mod: 25

## 2021-04-29 PROCEDURE — 73110 X-RAY EXAM OF WRIST: CPT

## 2021-04-29 PROCEDURE — 29125 APPL SHORT ARM SPLINT STATIC: CPT | Mod: LT

## 2021-04-29 PROCEDURE — 73130 X-RAY EXAM OF HAND: CPT | Mod: 26,LT

## 2021-04-29 PROCEDURE — 99284 EMERGENCY DEPT VISIT MOD MDM: CPT

## 2021-04-29 PROCEDURE — 73110 X-RAY EXAM OF WRIST: CPT | Mod: 26,LT

## 2021-04-29 PROCEDURE — 73090 X-RAY EXAM OF FOREARM: CPT | Mod: 26,LT

## 2021-04-29 PROCEDURE — 73090 X-RAY EXAM OF FOREARM: CPT

## 2021-04-29 PROCEDURE — 73130 X-RAY EXAM OF HAND: CPT

## 2021-04-29 RX ORDER — ACETAMINOPHEN 500 MG
650 TABLET ORAL ONCE
Refills: 0 | Status: COMPLETED | OUTPATIENT
Start: 2021-04-29 | End: 2021-04-29

## 2021-04-29 RX ADMIN — Medication 650 MILLIGRAM(S): at 13:17

## 2021-04-29 RX ADMIN — Medication 650 MILLIGRAM(S): at 14:02

## 2021-04-29 NOTE — ED PROVIDER NOTE - PROGRESS NOTE DETAILS
Gregorio Fisher, DO PGY-1: Pt splinted, sugar tong w/ thumb spica because of some tenderness to palpation of anatomic snuff box, will put in for ortho followup Gregorio Fisher, DO PGY-1: Ortho saw xrays, agreed w/ plan for f/u in 10-14 days

## 2021-04-29 NOTE — ED PROVIDER NOTE - NSFOLLOWUPINSTRUCTIONS_ED_ALL_ED_FT
You were seen in the Emergency Department for hand pain.    Follow up with an orthopedic surgeon within the week. Their information has been provided for your convenience.    For symptom control, you may use the acronym RICE:   Rest the affected area  Take Ibuprofen (Advil) as directed  Apply light Compression to the affected area  Elevate the affected area.     Keep your splint dry, keep your arm in a sling if you need it but be sure to move your arm and shoulder often.    You may take 650 mg Tylenol (acetaminophen) every eight hours as needed for pain.    If you have fever, chills, nausea, vomiting, new or worsening pain, or if you have any new symptoms return to the Emergency Department.

## 2021-04-29 NOTE — ED ADULT NURSE NOTE - CADM POA URETHRAL CATHETER
Diet For Vomiting or Diarrhea (Age 6 to Adult]    If your symptoms return or get worse after eating certain foods listed below, you should stop eating them until your symptoms discontinue and you feel better.  Once the vomiting stops, then...   During the first 12 to 24 hours  During the first 12 to 24 hours, follow the diet below:  · Beverages. Plain water, sport drinks like Gatorade, soft drinks without caffeine; mineral water (plain or flavored); clear fruit juices, decaffeinated tea and coffee.  · Soups. Clear broth, consommé, and bouillon  · Desserts. Plain gelatin, popsicles and fruit juice bars. As you feel better, you may add 6 oz to 8 oz of yogurt per day. If you are suffering from diarrhea, avoid foods and beverages that contain sugar, high-fructose corn syrup, and sugar alcohols.  During the next 24 hours  During the next 24 hours you may add the following to the above:  · Hot cereal, plain toast, bread, rolls, crackers  · Plain noodles, rice, mashed potatoes, chicken noodle or rice soup  · Unsweetened canned fruit (avoid pineapple), bananas  Limit fat intake to less than 15 grams per day by avoiding margarine, butter, oils, mayonnaise, sauces, gravies, fried foods, peanut butter, meat, poultry, and fish.  Limit fiber; avoid raw or cooked vegetables, fresh fruits (except bananas), and bran cereals.  Limit caffeine and chocolate. No spices or seasonings except salt.  During the next 24 hours  Gradually resume a normal diet, as you feel better and your symptoms lessen.  © 9515-5068 [employer]. [address] [place] [address], [place] [address]. All rights reserved. This information is not intended as a substitute for professional medical care. Always follow your healthcare professional's instructions.         No

## 2021-04-29 NOTE — ED PROVIDER NOTE - PATIENT PORTAL LINK FT
You can access the FollowMyHealth Patient Portal offered by [facility] by registering at the following website: [email or web address]. By joining [email or web address]’s FollowMyHealth portal, you will also be able to view your health information using other applications (apps) compatible with our system.

## 2021-04-29 NOTE — ED PROVIDER NOTE - OBJECTIVE STATEMENT
66F pmh rheumatoid arthritis presents to ED after falling on L wrist after falling from standing, pt states she did not land on an outstretched hand, now having L wrist pain, pt denies hitting her head denies LOC denies any other symptoms presently, able to feel distal to injury, some tenderness over L proximal hand as well. Pt endorses some hx of osteopenia as well. pain worse w/ wrist rotation, wrist flexion/extension, pt denies any new deformities of fingers or wrist.

## 2021-04-29 NOTE — ED PROVIDER NOTE - PHYSICAL EXAMINATION
CONSTITUTIONAL: well-appearing, in NAD  SKIN: L wrist ecchymosis  HEAD: NCAT  NECK: Supple; non tender. Full ROM.  CARD: RRR, no murmurs.  RESP: clear to ausculation b/l. No crackles or wheezing.  ABD: soft, non-tender, non-distended, no rebound or guarding.  EXT: L wrist rom limited by pain, bony tenderness to palpation over L wrist, dorsum of hand, tenderness to L snuff box, pain w/ rotation of wrist. pain with forearm squeeze  NEURO:  normal sensory. Normal gait.  PSYCH: Cooperative, appropriate.

## 2021-04-29 NOTE — ED PROVIDER NOTE - CLINICAL SUMMARY MEDICAL DECISION MAKING FREE TEXT BOX
66F pmh rheumatoid arthritis presents to ED after falling on L wrist after falling from standing, pt states she did not land on an outstretched hand, now having L wrist pain, pt denies hitting her head denies LOC denies any other symptoms presently, able to feel distal to injury, some tenderness over L proximal hand as well, concern for radial metaphysial fx, concern for scaphoid fx, xray hand/wrist/forearm thumb spica splint, consider additional splinting if necessary.

## 2021-04-29 NOTE — ED PROVIDER NOTE - NSPTACCESSSVCSAPPTDETAILS_ED_ALL_ED_FT
Followup w/ ortho clinic within 10-14 days please for nondisplaced distal radial metaphysial fracture, scaphoid tenderness on exam. Followup w/ Dr. Escobar or Ortho clinic  within 10-14 days please for nondisplaced distal radial metaphysial fracture, scaphoid tenderness on exam.

## 2021-04-29 NOTE — ED PROVIDER NOTE - CARE PROVIDER_API CALL
Clay Escobar)  Orthopaedic Surgery  [address]  [place] [address]  Phone: [phone]  Fax: [phone]  Follow Up Time:

## 2021-04-29 NOTE — ED ADULT NURSE REASSESSMENT NOTE - CONDITION
improved Area L Indication Text: Tumors in this location are included in Area L (trunk and extremities).  Mohs surgery is indicated for larger tumors, or tumors with aggressive histologic features, in these anatomic locations.

## 2021-04-29 NOTE — ED PROVIDER NOTE - NSFOLLOWUPCLINICS_GEN_ALL_ED_FT
[facility] Orthopedic Surgery  Orthopedic Surgery  [address], [address]rd & 4th [place] [address]  Phone: [phone]  Fax:

## 2021-04-29 NOTE — ED ADULT NURSE NOTE - OBJECTIVE STATEMENT
Patient  is  alert  and  oriented x3.  Color is  good  and  skin warm to touch.  She tripped  and  fell down at  home.  Swelling, ecchymosis  and  deformity noted  to  left  thumb  and  wrist.  Areas  are  painful  to  touch.  Patient  denies  any  lost  of  consciousness.

## 2021-05-03 ENCOUNTER — NON-APPOINTMENT (OUTPATIENT)
Age: 66
End: 2021-05-03

## 2021-05-06 ENCOUNTER — APPOINTMENT (OUTPATIENT)
Dept: ORTHOPEDIC SURGERY | Facility: CLINIC | Age: 66
End: 2021-05-06

## 2021-05-06 ENCOUNTER — RX RENEWAL (OUTPATIENT)
Age: 66
End: 2021-05-06

## 2021-05-12 ENCOUNTER — APPOINTMENT (OUTPATIENT)
Dept: ORTHOPEDIC SURGERY | Facility: CLINIC | Age: 66
End: 2021-05-12
Payer: MEDICARE

## 2021-05-12 VITALS
BODY MASS INDEX: 26.43 KG/M2 | TEMPERATURE: 97.8 F | HEART RATE: 65 BPM | SYSTOLIC BLOOD PRESSURE: 111 MMHG | DIASTOLIC BLOOD PRESSURE: 73 MMHG | WEIGHT: 140 LBS | HEIGHT: 61 IN

## 2021-05-12 DIAGNOSIS — Z87.39 PERSONAL HISTORY OF OTHER DISEASES OF THE MUSCULOSKELETAL SYSTEM AND CONNECTIVE TISSUE: ICD-10-CM

## 2021-05-12 DIAGNOSIS — Z78.9 OTHER SPECIFIED HEALTH STATUS: ICD-10-CM

## 2021-05-12 PROCEDURE — 99072 ADDL SUPL MATRL&STAF TM PHE: CPT

## 2021-05-12 PROCEDURE — 73110 X-RAY EXAM OF WRIST: CPT | Mod: LT

## 2021-05-12 PROCEDURE — 99203 OFFICE O/P NEW LOW 30 MIN: CPT

## 2021-05-12 NOTE — DISCUSSION/SUMMARY
[FreeTextEntry1] : She has findings consistent with what appears to be a nondisplaced and stable incomplete left distal radius fracture.\par \par I had a discussion regarding today's visit, the prognosis of this diagnosis, and treatment recommendations and options. At this time, she was instructed on protection with a splint. She will follow-up in 2 weeks.\par \par The patient has agreed to this plan of management and has expressed full understanding.  All questions were fully answered to the patient's satisfaction.\par \par My cumulative time spent on this patient's visit included: Preparation for the visit, review of the medical records, review of pertinent diagnostic studies, examination and counseling of the patient on the above diagnosis, treatment plan and prognosis, orders of diagnostic tests, medication and/or appropriate procedures and documentation in the medical records of today's visit.

## 2021-05-12 NOTE — ADDENDUM
[FreeTextEntry1] : I, Rosaline Calvin, acted solely as a scribe for Dr. Stevenson on this date 05/12/2021.

## 2021-05-12 NOTE — HISTORY OF PRESENT ILLNESS
[Left] : left hand dominant [FreeTextEntry1] : She comes in today for evaluation of a left wrist fracture, which occurred 13 days ago after a fall. She went to [facility] on 4/29/2021 where x-rays of the left wrist and hand demonstrated suspected distal radial metaphyseal fracture, generalized osteopenia, and soft tissue swelling. The fracture was reduced in the ER and she was fitted with a splint. She is having some pain. \par \par She has a history of rheumatoid arthritis. She is taking xeljanz. \par \par She is accompanied by her  today who is helping with translation. \par \par She was referred by Dr. Escobar, as he does not take her insurance.

## 2021-05-12 NOTE — END OF VISIT
[FreeTextEntry3] : This note was written by Rosaline Calvin on 05/12/2021 acting solely as a scribe for Dr. Glenn Stevenson.\par  \par All medical record entries made by the Scribe were at my, Dr. Glenn Stevenson, direction and personally dictated by me on 05/12/2021. I have personally reviewed the chart and agree that the record accurately reflects my personal performance of the history, physical exam, assessment and plan.

## 2021-05-12 NOTE — PHYSICAL EXAM
[de-identified] : - Constitutional: This is a female in no obvious distress.  She was accompanied by her  today.\par - Psych: Patient is alert and oriented to person, place and time.  Patient has a normal mood and affect.\par - Cardiovascular: Normal pulses throughout the upper extremities.  No significant varicosities are noted in the upper extremities. \par - Neuro: Strength and sensation are intact throughout the upper extremities.  Patient has normal coordination.\par - Respiratory:  Patient exhibits no evidence of shortness of breath or difficulty breathing.\par - Skin: No rashes, lesions, or other abnormalities are noted in the upper extremities.\par \par ---\par  \par Examination of her left wrist and hand after the splint was removed demonstrates swelling and ecchymosis.  She is tender along the distal radius dorsally.  There is no tenderness over the carpal bones.  She has evidence of rheumatologic involvement involving the digits with swan-neck deformities at the index through ring fingers.  She has a flex position of the little finger.  Her extensor tendons are intact.  She has limitation of flexion and extension of the digits.  She is neurovascularly intact distally.\par \par She also has rheumatologic involvement of the right hand with swan-neck deformities. [de-identified] : PA, lateral, and oblique radiographs of the left wrist demonstrate what appears to be a cortical disruption dorsally at the distal radius possibly representing a nondisplaced incomplete distal radius fracture.  There are degenerative changes noted at the at the CMC joint of the thumb.

## 2021-05-12 NOTE — CONSULT LETTER
[Dear  ___] : Dear  [unfilled], [Consult Letter:] : I had the pleasure of evaluating your patient, [unfilled]. [Please see my note below.] : Please see my note below. [Consult Closing:] : Thank you very much for allowing me to participate in the care of this patient.  If you have any questions, please do not hesitate to contact me. [Sincerely,] : Sincerely, [FreeTextEntry3] : Glenn Stevenson M.D.\par Surgery of the Hand & Upper Extremity\par Orthopaedic Surgery\par Chief, Hand Service, [facility]\par Director, Hand Service, [facility]\par  of Orthopedic Surgery, [facility] [employer] [facility] \par [facility] Email: [email or web address] Office Phone: [phone]

## 2021-05-17 ENCOUNTER — NON-APPOINTMENT (OUTPATIENT)
Age: 66
End: 2021-05-17

## 2021-05-19 PROBLEM — S52.502A DISTAL RADIUS FRACTURE, LEFT: Status: ACTIVE | Noted: 2021-05-12

## 2021-05-24 ENCOUNTER — APPOINTMENT (OUTPATIENT)
Dept: PULMONOLOGY | Facility: CLINIC | Age: 66
End: 2021-05-24

## 2021-05-26 ENCOUNTER — APPOINTMENT (OUTPATIENT)
Dept: ORTHOPEDIC SURGERY | Facility: CLINIC | Age: 66
End: 2021-05-26
Payer: MEDICARE

## 2021-05-26 VITALS — TEMPERATURE: 97.3 F

## 2021-05-26 DIAGNOSIS — S52.502A UNSPECIFIED FRACTURE OF THE LOWER END OF LEFT RADIUS, INITIAL ENCOUNTER FOR CLOSED FRACTURE: ICD-10-CM

## 2021-05-26 PROCEDURE — 99213 OFFICE O/P EST LOW 20 MIN: CPT

## 2021-05-26 PROCEDURE — 73110 X-RAY EXAM OF WRIST: CPT | Mod: LT

## 2021-05-26 NOTE — DISCUSSION/SUMMARY
[FreeTextEntry1] : I had a discussion regarding today's visit, the diagnosis and treatment recommendations and options.  We also discussed changes since the last visit.  At this time, she can discontinue using the splint.  She will follow-up on an as-needed basis.  I told her that if she is having issues with her hands or wrist regarding the rheumatoid, then she can see me in the future.\par \par The patient has agreed to the above plan of management and has expressed full understanding.  All questions were fully answered to the patient's satisfaction.\par \par My cumulative time spent on today's visit was greater than 30 minutes and included: Preparation for the visit, review of the medical records, review of pertinent diagnostic studies, examination and counseling of the patient on the above diagnosis, treatment plan and prognosis, orders of diagnostic tests, medications and/or appropriate procedures and documentation in the medical records of today's visit.

## 2021-05-26 NOTE — ADDENDUM
[FreeTextEntry1] : I, Rosalnie Calvin, acted solely as a scribe for Dr. Stevenson on this date 05/26/2021.

## 2021-05-26 NOTE — HISTORY OF PRESENT ILLNESS
[Left] : left hand dominant [FreeTextEntry1] : 27 days status post probable nondisplaced left distal radius fracture.\par \par She is doing well.  She denies pain.\par \par She has a history of rheumatoid arthritis. She is taking xeljanz. \par \par She is accompanied by her  today who is helping with translation.

## 2021-05-26 NOTE — END OF VISIT
[FreeTextEntry3] : This note was written by Rosaline Calvin on 05/26/2021 acting solely as a scribe for Dr. Glenn Stevenson.\par  \par All medical record entries made by the Scribe were at my, Dr. Glenn Stevenson, direction and personally dictated by me on 05/26/2021. I have personally reviewed the chart and agree that the record accurately reflects my personal performance of the history, physical exam, assessment and plan.

## 2021-05-26 NOTE — PHYSICAL EXAM
[de-identified] : - Constitutional: This is a female in no obvious distress.  She was accompanied by her  today.\par - Psych: Patient is alert and oriented to person, place and time.  Patient has a normal mood and affect.\par - Cardiovascular: Normal pulses throughout the upper extremities.  No significant varicosities are noted in the upper extremities. \par - Neuro: Strength and sensation are intact throughout the upper extremities.  Patient has normal coordination.\par - Respiratory:  Patient exhibits no evidence of shortness of breath or difficulty breathing.\par - Skin: No rashes, lesions, or other abnormalities are noted in the upper extremities.\par \par ---\par  \par Examination of her left wrist and hand after the splint was removed demonstrates decreased swelling.  She has evidence of rheumatologic involvement involving the digits with swan-neck deformities at the index through ring fingers.  She has a flex position of the little finger.  Her extensor tendons are intact.  She has limitation of flexion and extension of the digits.  She is neurovascularly intact distally.\par \par She also has rheumatologic involvement of the right hand with swan-neck deformities. [de-identified] : PA, lateral, and oblique radiographs of the left wrist demonstrate no definitive fracture..  There are degenerative changes noted at the at the CMC joint of the thumb.

## 2021-06-04 LAB — H PYLORI AG STL QL: NOT DETECTED

## 2021-07-02 ENCOUNTER — APPOINTMENT (OUTPATIENT)
Dept: RHEUMATOLOGY | Facility: CLINIC | Age: 66
End: 2021-07-02
Payer: MEDICARE

## 2021-07-02 VITALS
HEART RATE: 66 BPM | DIASTOLIC BLOOD PRESSURE: 69 MMHG | TEMPERATURE: 97.4 F | SYSTOLIC BLOOD PRESSURE: 114 MMHG | HEIGHT: 61 IN | RESPIRATION RATE: 17 BRPM | BODY MASS INDEX: 25.3 KG/M2 | WEIGHT: 134 LBS | OXYGEN SATURATION: 98 %

## 2021-07-02 PROCEDURE — 99214 OFFICE O/P EST MOD 30 MIN: CPT

## 2021-07-02 RX ORDER — PREDNISONE 5 MG/1
5 TABLET ORAL
Refills: 0 | Status: DISCONTINUED | COMMUNITY
Start: 2018-06-13 | End: 2021-07-02

## 2021-07-03 LAB
25(OH)D3 SERPL-MCNC: 39.4 NG/ML
ALBUMIN SERPL ELPH-MCNC: 4.4 G/DL
ALP BLD-CCNC: 59 U/L
ALT SERPL-CCNC: 18 U/L
ANION GAP SERPL CALC-SCNC: 12 MMOL/L
AST SERPL-CCNC: 25 U/L
BASOPHILS # BLD AUTO: 0.03 K/UL
BASOPHILS NFR BLD AUTO: 0.5 %
BILIRUB SERPL-MCNC: 0.4 MG/DL
BUN SERPL-MCNC: 16 MG/DL
CALCIUM SERPL-MCNC: 9.5 MG/DL
CHLORIDE SERPL-SCNC: 101 MMOL/L
CO2 SERPL-SCNC: 26 MMOL/L
CREAT SERPL-MCNC: 0.84 MG/DL
CRP SERPL-MCNC: <3 MG/L
EOSINOPHIL # BLD AUTO: 0.23 K/UL
EOSINOPHIL NFR BLD AUTO: 3.9 %
ERYTHROCYTE [SEDIMENTATION RATE] IN BLOOD BY WESTERGREN METHOD: 46 MM/HR
GLUCOSE SERPL-MCNC: 85 MG/DL
HCT VFR BLD CALC: 41.4 %
HGB BLD-MCNC: 13.6 G/DL
IMM GRANULOCYTES NFR BLD AUTO: 0.3 %
LYMPHOCYTES # BLD AUTO: 1.48 K/UL
LYMPHOCYTES NFR BLD AUTO: 24.8 %
MAN DIFF?: NORMAL
MCHC RBC-ENTMCNC: 30.4 PG
MCHC RBC-ENTMCNC: 32.9 GM/DL
MCV RBC AUTO: 92.6 FL
MONOCYTES # BLD AUTO: 0.47 K/UL
MONOCYTES NFR BLD AUTO: 7.9 %
NEUTROPHILS # BLD AUTO: 3.73 K/UL
NEUTROPHILS NFR BLD AUTO: 62.6 %
PLATELET # BLD AUTO: 228 K/UL
POTASSIUM SERPL-SCNC: 4.5 MMOL/L
PROT SERPL-MCNC: 8 G/DL
RBC # BLD: 4.47 M/UL
RBC # FLD: 14 %
SODIUM SERPL-SCNC: 139 MMOL/L
WBC # FLD AUTO: 5.96 K/UL

## 2021-07-13 ENCOUNTER — APPOINTMENT (OUTPATIENT)
Dept: INTERNAL MEDICINE | Facility: CLINIC | Age: 66
End: 2021-07-13
Payer: MEDICARE

## 2021-07-13 VITALS
SYSTOLIC BLOOD PRESSURE: 132 MMHG | HEART RATE: 74 BPM | BODY MASS INDEX: 25.3 KG/M2 | OXYGEN SATURATION: 84 % | TEMPERATURE: 97.3 F | WEIGHT: 134 LBS | DIASTOLIC BLOOD PRESSURE: 84 MMHG | HEIGHT: 61 IN

## 2021-07-13 VITALS — DIASTOLIC BLOOD PRESSURE: 74 MMHG | SYSTOLIC BLOOD PRESSURE: 130 MMHG

## 2021-07-13 DIAGNOSIS — Z86.59 PERSONAL HISTORY OF OTHER MENTAL AND BEHAVIORAL DISORDERS: ICD-10-CM

## 2021-07-13 PROCEDURE — 99214 OFFICE O/P EST MOD 30 MIN: CPT

## 2021-07-13 RX ORDER — FAMOTIDINE 20 MG/1
20 TABLET, FILM COATED ORAL TWICE DAILY
Qty: 120 | Refills: 5 | Status: DISCONTINUED | COMMUNITY
Start: 2021-04-19 | End: 2021-07-13

## 2021-07-13 RX ORDER — FLUTICASONE FUROATE AND VILANTEROL TRIFENATATE 200; 25 UG/1; UG/1
200-25 POWDER RESPIRATORY (INHALATION)
Qty: 1 | Refills: 8 | Status: ACTIVE | COMMUNITY

## 2021-07-13 RX ORDER — FAMOTIDINE 40 MG/1
40 TABLET, FILM COATED ORAL
Qty: 120 | Refills: 3 | Status: ACTIVE | COMMUNITY
Start: 2021-07-13 | End: 1900-01-01

## 2021-07-13 NOTE — ASSESSMENT
[FreeTextEntry1] : stop meclizine\par stop Ximena's Wort\par see Psychiatry PARNOIA and auditory hallucinations and inappropriate spending No SSRI\par Poor history\par \par Gastritis no H pylori\par Increase H2 to 40mg BID \par \par RA  Rheum\par Crohn's  See GI\par

## 2021-07-13 NOTE — PHYSICAL EXAM
[No Acute Distress] : no acute distress [Well Nourished] : well nourished [Well Developed] : well developed [Well-Appearing] : well-appearing [Normal Sclera/Conjunctiva] : normal sclera/conjunctiva [PERRL] : pupils equal round and reactive to light [EOMI] : extraocular movements intact [Normal Outer Ear/Nose] : the outer ears and nose were normal in appearance [Normal Oropharynx] : the oropharynx was normal [No JVD] : no jugular venous distention [No Lymphadenopathy] : no lymphadenopathy [Supple] : supple [Thyroid Normal, No Nodules] : the thyroid was normal and there were no nodules present [No Respiratory Distress] : no respiratory distress  [No Accessory Muscle Use] : no accessory muscle use [Clear to Auscultation] : lungs were clear to auscultation bilaterally [Normal Rate] : normal rate  [Regular Rhythm] : with a regular rhythm [Normal S1, S2] : normal S1 and S2 [No Murmur] : no murmur heard [No Carotid Bruits] : no carotid bruits [No Abdominal Bruit] : a ~M bruit was not heard ~T in the abdomen [No Varicosities] : no varicosities [Pedal Pulses Present] : the pedal pulses are present [No Edema] : there was no peripheral edema [No Palpable Aorta] : no palpable aorta [No Extremity Clubbing/Cyanosis] : no extremity clubbing/cyanosis [Soft] : abdomen soft [Non Tender] : non-tender [Non-distended] : non-distended [No Masses] : no abdominal mass palpated [No HSM] : no HSM [Normal Bowel Sounds] : normal bowel sounds [Normal Posterior Cervical Nodes] : no posterior cervical lymphadenopathy [Normal Anterior Cervical Nodes] : no anterior cervical lymphadenopathy [No CVA Tenderness] : no CVA  tenderness [No Spinal Tenderness] : no spinal tenderness [No Joint Swelling] : no joint swelling [Grossly Normal Strength/Tone] : grossly normal strength/tone [No Rash] : no rash [Coordination Grossly Intact] : coordination grossly intact [No Focal Deficits] : no focal deficits [Normal Gait] : normal gait [Deep Tendon Reflexes (DTR)] : deep tendon reflexes were 2+ and symmetric [Normal Affect] : the affect was normal [Normal Insight/Judgement] : insight and judgment were intact [de-identified] : vertical nystagmus R Jose Alicia

## 2021-07-13 NOTE — HISTORY OF PRESENT ILLNESS
[FreeTextEntry1] : Follow up Health Care [de-identified] : 67 yo nonocclusive CAD, RA  Crohn's ileitis\par \par Gastritis only PPI then H2 blocker per GI\par Taking Pepcid 20 BID \par Reports epigastric burning despite famotidine 20 BID\par \par BPPV \par Fall May 2021\par Took Ximena's Wort 2019  I ADVISED GI UPSET and numerous GI\par \par Admits to paranoia Admits to auditory hallucination sometimes buys products indiscriminantlu

## 2021-08-03 ENCOUNTER — APPOINTMENT (OUTPATIENT)
Dept: INTERNAL MEDICINE | Facility: CLINIC | Age: 66
End: 2021-08-03
Payer: MEDICARE

## 2021-08-03 VITALS
BODY MASS INDEX: 25.3 KG/M2 | HEART RATE: 70 BPM | TEMPERATURE: 97 F | SYSTOLIC BLOOD PRESSURE: 126 MMHG | WEIGHT: 134 LBS | HEIGHT: 61 IN | DIASTOLIC BLOOD PRESSURE: 76 MMHG | OXYGEN SATURATION: 96 %

## 2021-08-03 DIAGNOSIS — M26.622 ARTHRALGIA OF LEFT TEMPOROMANDIBULAR JOINT: ICD-10-CM

## 2021-08-03 LAB — VIT B12 SERPL-MCNC: 1088 PG/ML

## 2021-08-03 PROCEDURE — 99214 OFFICE O/P EST MOD 30 MIN: CPT | Mod: 25

## 2021-08-03 RX ORDER — PANTOPRAZOLE 40 MG/1
40 TABLET, DELAYED RELEASE ORAL
Refills: 0 | Status: COMPLETED | COMMUNITY
End: 2021-07-13

## 2021-08-03 NOTE — HISTORY OF PRESENT ILLNESS
[FreeTextEntry1] : CAD [de-identified] : 65 yo nonocclusive CAD, LAD Ca++ score and perfusion defect  apical and distal inferolateral\par Has not F/U CARDIOLOGY as advised since 2019  stated to see CARDIO MAY 2021\par \par Heavy stomach stool H pylori 2021 NEGATIVE  we incr H2 blocker to 40mg BID\par \par \par RA ILD  CT pulmonary fibrosis but PFT NORMAL  updated COVID, Tdap, pneumo 13 and 23 AND FLU\par \par Osteopenia\par \par RA, Crohn's ileitis, gastritis, BPPV, TMJ\par Paranoia and hallucinations and indiscriminant purchase\par gastritis NO H PYLORI   Increased H2 to 40mg BID  BETTER NOW\par \par advised see GI  Inflammatory bowel\par \par F/U rheumatology RA\par \par F/U Psychiatry states her mother  pregnant when she was 2 years old.Nervous all her life states she hears her name. Has been depressed since childhood. Hears her  yell "Katia hurry up"  but is sure that he is not saying this. The last time this happened 2019 before the Pandemic. No visual hallucination. STOPPED Ximena's Wort\par \par Given Dental Guard for TMJ\par \par States no hallucination until last year. OCcurred 2020 5 TIMES and never recurred. Spoke to someone once in [place] x 3 months in . Took medicine unknown  NO hallucinations then. No fluctuation of consciousness, no new medications, no o new forgetfulness. States hearing her  was normal last year\par \par No hallucinations at all now\par \par MRI C spine multilevel spondylosis  NO CHANGE

## 2021-08-03 NOTE — REVIEW OF SYSTEMS
[Fever] : no fever [Chest Pain] : no chest pain [Palpitations] : no palpitations [Orthopnea] : no orthopnea [Shortness Of Breath] : no shortness of breath [Wheezing] : no wheezing [Suicidal] : not suicidal [Anxiety] : anxiety [Depression] : depression [Easy Bleeding] : no easy bleeding

## 2021-08-03 NOTE — ASSESSMENT
[FreeTextEntry1] : Xelianz may cause GASTRITIS   \par Multiple musculoskeletal complaints\par Episodic abdominal gas when constipated\par States hearing her name called when stressed is NORMAL but never had this before July 2020 nor since then.\par Discussion in Guamanian\par Denies current hallucinations  Mild depression  No homicidal nor suicidal ideation\par \par Prefers a counselor Guamanian-speaking\par \par TMJ  ENT  Dental Guard improving SXS\par Cardiac  non obstr CAD F/U Cardiology\par \par Multiple complaints for years. Paresthesia intermittent MRI brain\par Neurology\par RPR B12 in case DELIRIUM but NO SXS NOW\par Neurology\par Possibly early onset mild dementia and stress. Communication given English 2nd Language and Education \par Nothing found on PE\par \par RA hand and wrist deformity Symmetric consistent with old RA\par C spine MRI 2018 SATBLE no change 2013\par \par Mixture of physiologic and pathologic symptoms  Constipation  gastritis improved on H2 blocker off PPI

## 2021-08-04 LAB — T PALLIDUM AB SER QL IA: NEGATIVE

## 2021-08-05 ENCOUNTER — TRANSCRIPTION ENCOUNTER (OUTPATIENT)
Age: 66
End: 2021-08-05

## 2021-08-06 ENCOUNTER — TRANSCRIPTION ENCOUNTER (OUTPATIENT)
Age: 66
End: 2021-08-06

## 2021-08-11 ENCOUNTER — TRANSCRIPTION ENCOUNTER (OUTPATIENT)
Age: 66
End: 2021-08-11

## 2021-08-16 ENCOUNTER — TRANSCRIPTION ENCOUNTER (OUTPATIENT)
Age: 66
End: 2021-08-16

## 2021-08-16 ENCOUNTER — NON-APPOINTMENT (OUTPATIENT)
Age: 66
End: 2021-08-16

## 2021-08-21 ENCOUNTER — RX RENEWAL (OUTPATIENT)
Age: 66
End: 2021-08-21

## 2021-08-25 ENCOUNTER — APPOINTMENT (OUTPATIENT)
Dept: CARDIOLOGY | Facility: CLINIC | Age: 66
End: 2021-08-25
Payer: MEDICARE

## 2021-08-25 ENCOUNTER — NON-APPOINTMENT (OUTPATIENT)
Age: 66
End: 2021-08-25

## 2021-08-25 VITALS
TEMPERATURE: 97.6 F | OXYGEN SATURATION: 99 % | WEIGHT: 136 LBS | BODY MASS INDEX: 25.68 KG/M2 | SYSTOLIC BLOOD PRESSURE: 106 MMHG | HEART RATE: 58 BPM | HEIGHT: 61 IN | DIASTOLIC BLOOD PRESSURE: 66 MMHG

## 2021-08-25 PROCEDURE — 93000 ELECTROCARDIOGRAM COMPLETE: CPT

## 2021-08-25 PROCEDURE — 99204 OFFICE O/P NEW MOD 45 MIN: CPT

## 2021-08-25 NOTE — DISCUSSION/SUMMARY
[FreeTextEntry1] : Assessment:\par 1.  Coronary Atherosclerosis\par 2.  Chest pressure - some improvement with coreg\par 3.  + Calcium score\par 4.  Family history of CAD\par 5.  RA\par 6.  Anxiety.\par \par \par  Plan:\par 1.  Continue medical therapy for now with:\par - antiplatlet\par - statin\par - betablocker\par 2.  She needs to have her lipid panel checked with next blood draw\par 3.  Evaluate for ongoing chest pressure with known LAD calcium with:\par - echocardiogram\par - coronary calcium score\par (her RA prohibits treadmill stress testing)\par 4.  We discussed role for coronary angiogram to see, however she favors coronary CTA \par \par Await results, call afterwards\par \par \par Elaine \par \par

## 2021-08-25 NOTE — HISTORY OF PRESENT ILLNESS
[FreeTextEntry1] : 2021\par \par Here with her .\par Complains of chest pressure that radiates to the left arm\par The pressure is intermittent. \par This morning she had some pressure.  Can be spontaneous.  Not exertional.\par She was seen by Dr. Swanson in the past was started coreg 3.125mg BID\par \par ECG today is sinus without ischemia.\par \par She is anxious about the symptoms.  \par \par Medications:\par Xeljanz\par Famotidine\par Prednisone\par Meclizine \par Coreg 3.125mg BID\par atorvastatin 40\par Folic acid\par Aspirin 81mg daily \par \par \par Never smoker.\par Paternal Uncle  with MI\par Maternal Uncles with MI.  \par \par \par \par Prior note: \par 64 year old female with PMH of long-standing RA on DMARDs presents for follow up of chest pain. \par \par Of note previously, evaluated at [facility] ED with coronary CT, which showed calcium score of 470. She then had nuclear stress test, which showed perfusion defect with normal wall motion suggestive of attenuation with possible apical scar. \par \par Pt describes her chest discomfort as pressure-like sensation across her chest with no radiation. Each episode lasts few seconds at a time and occurs randomly whether she's exerting herself or not. She has mild SOB as well with each episode but resting makes her sx better. Pt has been compliant with her RA medications and otherwise denied any new onset of symptoms. She also denied any recent changes in meds, recent travels, and sick contacts.\par \par Since visit , patient had repeat TTE with no abnormalities. Patient reports ongoing symptoms. Remain atypical. \par \par Cardiac studies:\par 2018 - Coronary CT\par Coronary arteries: \par Coronary artery calcium Agatston score:  \par Left main (LM) coronary artery:  0\par Left anterior descending (LAD) coronary artery:  371\par Left circumflex (LCX) coronary artery:  47\par Right coronary artery (RCA):  0\par Total:  418\par \par 2018 - Nuc stress test\par The left ventricle was small in size. There are mild\par defects in the distal anterior, apical, and distal\par inferolateral walls that are fixed and demonstrate\par preserved wall motion suggestive of attenuation artifact.\par As these defects did not correct with prone imaging, an\par area of scar in this territory cannot be excluded.\par Post-stress gated wall motion analysis was performed\par (LVEF > 70%;LVEDV = 49 ml.), revealing normal LV function.\par

## 2021-08-25 NOTE — PHYSICAL EXAM
[General Appearance - Well Developed] : well developed [Normal Appearance] : normal appearance [General Appearance - Well Nourished] : well nourished [Normal Oral Mucosa] : normal oral mucosa [Normal Oropharynx] : normal oropharynx [Heart Rate And Rhythm] : heart rate and rhythm were normal [Heart Sounds] : normal S1 and S2 [Murmurs] : no murmurs present [Arterial Pulses Normal] : the arterial pulses were normal [Edema] : no peripheral edema present [Respiration, Rhythm And Depth] : normal respiratory rhythm and effort [Auscultation Breath Sounds / Voice Sounds] : lungs were clear to auscultation bilaterally [Bowel Sounds] : normal bowel sounds [Skin Color & Pigmentation] : normal skin color and pigmentation [No Venous Stasis] : no venous stasis [Affect] : the affect was normal [FreeTextEntry1] : Joint deformities noted in bilateral hands [Nail Clubbing] : no clubbing of the fingernails [Cyanosis, Localized] : no localized cyanosis [Petechial Hemorrhages (___cm)] : no petechial hemorrhages [] : no ischemic changes

## 2021-08-30 ENCOUNTER — TRANSCRIPTION ENCOUNTER (OUTPATIENT)
Age: 66
End: 2021-08-30

## 2021-09-01 ENCOUNTER — NON-APPOINTMENT (OUTPATIENT)
Age: 66
End: 2021-09-01

## 2021-09-01 ENCOUNTER — APPOINTMENT (OUTPATIENT)
Dept: CARDIOLOGY | Facility: HOSPITAL | Age: 66
End: 2021-09-01

## 2021-09-01 ENCOUNTER — OUTPATIENT (OUTPATIENT)
Dept: OUTPATIENT SERVICES | Facility: HOSPITAL | Age: 66
LOS: 1 days | End: 2021-09-01
Payer: MEDICARE

## 2021-09-01 DIAGNOSIS — Z00.00 ENCOUNTER FOR GENERAL ADULT MEDICAL EXAMINATION WITHOUT ABNORMAL FINDINGS: ICD-10-CM

## 2021-09-01 DIAGNOSIS — R07.9 CHEST PAIN, UNSPECIFIED: ICD-10-CM

## 2021-09-01 LAB
BUN SERPL-MCNC: 22 MG/DL
CREAT SERPL-MCNC: 0.91 MG/DL

## 2021-09-01 PROCEDURE — 75574 CT ANGIO HRT W/3D IMAGE: CPT | Mod: 26

## 2021-09-01 PROCEDURE — 75574 CT ANGIO HRT W/3D IMAGE: CPT

## 2021-09-16 ENCOUNTER — TRANSCRIPTION ENCOUNTER (OUTPATIENT)
Age: 66
End: 2021-09-16

## 2021-09-27 ENCOUNTER — APPOINTMENT (OUTPATIENT)
Dept: CARDIOLOGY | Facility: CLINIC | Age: 66
End: 2021-09-27
Payer: MEDICARE

## 2021-09-27 PROCEDURE — 93306 TTE W/DOPPLER COMPLETE: CPT

## 2021-10-05 ENCOUNTER — APPOINTMENT (OUTPATIENT)
Dept: RHEUMATOLOGY | Facility: CLINIC | Age: 66
End: 2021-10-05
Payer: MEDICARE

## 2021-10-05 VITALS
WEIGHT: 136 LBS | BODY MASS INDEX: 25.68 KG/M2 | OXYGEN SATURATION: 98 % | HEIGHT: 61 IN | TEMPERATURE: 97.2 F | HEART RATE: 60 BPM | SYSTOLIC BLOOD PRESSURE: 126 MMHG | DIASTOLIC BLOOD PRESSURE: 74 MMHG

## 2021-10-05 PROCEDURE — 99214 OFFICE O/P EST MOD 30 MIN: CPT | Mod: 25

## 2021-10-05 PROCEDURE — G0008: CPT

## 2021-10-05 PROCEDURE — 90662 IIV NO PRSV INCREASED AG IM: CPT

## 2021-10-06 ENCOUNTER — APPOINTMENT (OUTPATIENT)
Dept: OTOLARYNGOLOGY | Facility: CLINIC | Age: 66
End: 2021-10-06
Payer: MEDICAID

## 2021-10-06 ENCOUNTER — NON-APPOINTMENT (OUTPATIENT)
Age: 66
End: 2021-10-06

## 2021-10-06 VITALS
DIASTOLIC BLOOD PRESSURE: 79 MMHG | WEIGHT: 136 LBS | HEIGHT: 61 IN | SYSTOLIC BLOOD PRESSURE: 118 MMHG | TEMPERATURE: 97.8 F | BODY MASS INDEX: 25.68 KG/M2 | HEART RATE: 62 BPM

## 2021-10-06 DIAGNOSIS — M26.609 UNSPECIFIED TEMPOROMANDIBULAR JOINT DISORDER: ICD-10-CM

## 2021-10-06 LAB
25(OH)D3 SERPL-MCNC: 31.4 NG/ML
ALBUMIN SERPL ELPH-MCNC: 4.2 G/DL
ALP BLD-CCNC: 52 U/L
ALT SERPL-CCNC: 18 U/L
ANION GAP SERPL CALC-SCNC: 13 MMOL/L
AST SERPL-CCNC: 18 U/L
BASOPHILS # BLD AUTO: 0.04 K/UL
BASOPHILS NFR BLD AUTO: 0.6 %
BILIRUB SERPL-MCNC: 0.3 MG/DL
BUN SERPL-MCNC: 17 MG/DL
CALCIUM SERPL-MCNC: 9.3 MG/DL
CHLORIDE SERPL-SCNC: 104 MMOL/L
CO2 SERPL-SCNC: 24 MMOL/L
CREAT SERPL-MCNC: 0.8 MG/DL
CRP SERPL-MCNC: <3 MG/L
EOSINOPHIL # BLD AUTO: 0.22 K/UL
EOSINOPHIL NFR BLD AUTO: 3.2 %
ERYTHROCYTE [SEDIMENTATION RATE] IN BLOOD BY WESTERGREN METHOD: 59 MM/HR
GLUCOSE SERPL-MCNC: 87 MG/DL
HAV IGM SER QL: NONREACTIVE
HBV CORE IGG+IGM SER QL: NONREACTIVE
HBV CORE IGM SER QL: NONREACTIVE
HBV SURFACE AG SER QL: NONREACTIVE
HCT VFR BLD CALC: 42 %
HCV AB SER QL: NONREACTIVE
HCV S/CO RATIO: 0.17 S/CO
HGB BLD-MCNC: 12.9 G/DL
IMM GRANULOCYTES NFR BLD AUTO: 0.3 %
LYMPHOCYTES # BLD AUTO: 1.36 K/UL
LYMPHOCYTES NFR BLD AUTO: 19.7 %
MAN DIFF?: NORMAL
MCHC RBC-ENTMCNC: 29.6 PG
MCHC RBC-ENTMCNC: 30.7 GM/DL
MCV RBC AUTO: 96.3 FL
MONOCYTES # BLD AUTO: 0.43 K/UL
MONOCYTES NFR BLD AUTO: 6.2 %
NEUTROPHILS # BLD AUTO: 4.85 K/UL
NEUTROPHILS NFR BLD AUTO: 70 %
PLATELET # BLD AUTO: 258 K/UL
POTASSIUM SERPL-SCNC: 4.4 MMOL/L
PROT SERPL-MCNC: 7.4 G/DL
RBC # BLD: 4.36 M/UL
RBC # FLD: 13.9 %
SODIUM SERPL-SCNC: 141 MMOL/L
WBC # FLD AUTO: 6.92 K/UL

## 2021-10-06 PROCEDURE — 99204 OFFICE O/P NEW MOD 45 MIN: CPT | Mod: 25

## 2021-10-06 PROCEDURE — 31231 NASAL ENDOSCOPY DX: CPT

## 2021-10-06 PROCEDURE — 92567 TYMPANOMETRY: CPT

## 2021-10-06 PROCEDURE — 92557 COMPREHENSIVE HEARING TEST: CPT

## 2021-10-06 NOTE — PHYSICAL EXAM
[Normal] : mucosa is normal [Midline] : trachea located in midline position [de-identified] : + crepitus and movement; tenderness on left TMJ

## 2021-10-06 NOTE — REASON FOR VISIT
[Initial Consultation] : an initial consultation for [Source: ______] : History obtained from [unfilled] [FreeTextEntry2] : TMJ

## 2021-10-06 NOTE — CONSULT LETTER
[Dear  ___] : Dear  [unfilled], [Consult Letter:] : I had the pleasure of evaluating your patient, [unfilled]. [Please see my note below.] : Please see my note below. [Consult Closing:] : Thank you very much for allowing me to participate in the care of this patient.  If you have any questions, please do not hesitate to contact me. [Sincerely,] : Sincerely, [FreeTextEntry3] : Najma Hensley MD\par Otolaryngology and Cranial Base Surgery\par Attending Physician - Department of Otolaryngology and Head & Neck Surgery\par [facility]\par \par [employer] [facility]

## 2021-10-06 NOTE — HISTORY OF PRESENT ILLNESS
[de-identified] : 65yo female with hx of left TMJ pain for a year. Notes left ear pain that travels down the jaw. She does endorse grinding her teeth at night. Also notes chronic sinus issues for years. She has congestion and pain in the forehead/eyes. She uses a saline irrigation for the nose every day which helps. She has a history of rheumatoid arthritis for which she is on prednisone daily and xeljanz. Also history of migraines and occasional dizziness.\par \par  used as patient primary language is Bruneian.

## 2021-10-06 NOTE — ASSESSMENT
[FreeTextEntry1] : cerumen impaction:\par - removed today\par - avoid cotton swabs\par \par TMJ:\par - discussed etiology of TMJ and why can cause referred ear pain\par - advised to give a trial of NSAIDS, warm compresses, soft diet, no chewing gum\par - recommend referral to dentist or oral surgery for TMJ eval/treatment since significant crepitus and hx RA\par \par hx sinusitis:\par - no evidence of infection on exam today\par - recommend continue saline irrigations\par \par SNHL:\par - mod HF SNHL\par - consider HAE\par \par f/u PRN\par \par

## 2021-10-07 LAB
M TB IFN-G BLD-IMP: NEGATIVE
QUANTIFERON TB PLUS MITOGEN MINUS NIL: 9.58 IU/ML
QUANTIFERON TB PLUS NIL: 0.01 IU/ML
QUANTIFERON TB PLUS TB1 MINUS NIL: 0.03 IU/ML
QUANTIFERON TB PLUS TB2 MINUS NIL: 0.04 IU/ML

## 2021-10-12 ENCOUNTER — OUTPATIENT (OUTPATIENT)
Dept: OUTPATIENT SERVICES | Facility: HOSPITAL | Age: 66
LOS: 1 days | End: 2021-10-12
Payer: MEDICARE

## 2021-10-12 PROCEDURE — 0502T: CPT

## 2021-10-12 PROCEDURE — 0503T: CPT

## 2021-10-13 ENCOUNTER — RESULT REVIEW (OUTPATIENT)
Age: 66
End: 2021-10-13

## 2021-10-13 DIAGNOSIS — R07.9 CHEST PAIN, UNSPECIFIED: ICD-10-CM

## 2021-10-13 PROCEDURE — 0504T: CPT

## 2021-10-20 ENCOUNTER — APPOINTMENT (OUTPATIENT)
Dept: CARDIOLOGY | Facility: CLINIC | Age: 66
End: 2021-10-20
Payer: MEDICARE

## 2021-10-20 VITALS
HEIGHT: 61 IN | TEMPERATURE: 97.7 F | BODY MASS INDEX: 26.24 KG/M2 | SYSTOLIC BLOOD PRESSURE: 101 MMHG | DIASTOLIC BLOOD PRESSURE: 66 MMHG | WEIGHT: 139 LBS | HEART RATE: 60 BPM | OXYGEN SATURATION: 96 %

## 2021-10-20 PROCEDURE — 99214 OFFICE O/P EST MOD 30 MIN: CPT

## 2021-10-20 NOTE — PHYSICAL EXAM
[General Appearance - Well Developed] : well developed [Normal Appearance] : normal appearance [General Appearance - Well Nourished] : well nourished [Normal Oral Mucosa] : normal oral mucosa [Normal Oropharynx] : normal oropharynx [Respiration, Rhythm And Depth] : normal respiratory rhythm and effort [Auscultation Breath Sounds / Voice Sounds] : lungs were clear to auscultation bilaterally [Heart Rate And Rhythm] : heart rate and rhythm were normal [Heart Sounds] : normal S1 and S2 [Murmurs] : no murmurs present [Arterial Pulses Normal] : the arterial pulses were normal [Edema] : no peripheral edema present [Bowel Sounds] : normal bowel sounds [Nail Clubbing] : no clubbing of the fingernails [Cyanosis, Localized] : no localized cyanosis [Petechial Hemorrhages (___cm)] : no petechial hemorrhages [] : no ischemic changes [Skin Color & Pigmentation] : normal skin color and pigmentation [No Venous Stasis] : no venous stasis [Affect] : the affect was normal [FreeTextEntry1] : Joint deformities noted in bilateral hands

## 2021-10-20 NOTE — HISTORY OF PRESENT ILLNESS
[FreeTextEntry1] : 10/20/2021\par Doing ok, no exertional chest pain or sob\par Muscles are aching her in the chest and the arm\par no leg swelling\par She is on aspirin and statin therapy\par BP is well controlled\par \par \par CT heart coronary 9/1/2021\par Mid LAD 50%\par Cx 30%\par \par Echo:  EF 63%, mild MR, normal LV size and function, borderline pHTN\par \par Medications:\par Xeljanz\par Famotidine\par Prednisone\par Meclizine \par Coreg 3.125mg BID\par atorvastatin 40\par Folic acid\par Aspirin 81mg daily

## 2021-10-20 NOTE — DISCUSSION/SUMMARY
[FreeTextEntry1] : Assessment:\par 1.  Coronary Atherosclerosis\par 2.  Chest pressure - some improvement with coreg\par 3.  + Calcium score\par 4.  Family history of CAD\par 5.  RA\par 6.  Anxiety.\par \par \par  Plan:\par 1.  Continue medical therapy for now with:\par - antiplatlet\par - statin\par - betablocker\par 2. Will check her lipid panel \par 3.  LDL goal less than 70 given 50% LAD disease\par 4.  Will call after lipid panel results are in \par 5.  Otherwise, if she feels ok then return in 1 year\par \par

## 2021-10-21 LAB
CHOLEST SERPL-MCNC: 255 MG/DL
HDLC SERPL-MCNC: 93 MG/DL
LDLC SERPL CALC-MCNC: 140 MG/DL
NONHDLC SERPL-MCNC: 162 MG/DL
TRIGL SERPL-MCNC: 112 MG/DL

## 2021-10-22 ENCOUNTER — APPOINTMENT (OUTPATIENT)
Dept: INTERNAL MEDICINE | Facility: CLINIC | Age: 66
End: 2021-10-22
Payer: MEDICARE

## 2021-10-22 VITALS
OXYGEN SATURATION: 99 % | SYSTOLIC BLOOD PRESSURE: 107 MMHG | TEMPERATURE: 97.2 F | WEIGHT: 141 LBS | HEIGHT: 61 IN | HEART RATE: 59 BPM | BODY MASS INDEX: 26.62 KG/M2 | DIASTOLIC BLOOD PRESSURE: 71 MMHG

## 2021-10-22 PROCEDURE — 99214 OFFICE O/P EST MOD 30 MIN: CPT

## 2021-10-22 NOTE — ASSESSMENT
[FreeTextEntry1] : Multiple complaints\par \par BPPV   L sided  Modified Semont  L side  NO MORE MECLIZINE\par Not hallucinations  D/C MRI\par \par Neuropathy: Neuro\par RA and CAD  LDL GOAL <=70  higher statin agree

## 2021-10-22 NOTE — HISTORY OF PRESENT ILLNESS
[de-identified] : 66\par \par nonocclusive CAD\par  so increased ator from 20mg --->80mg now\par \par Now states not hallucination  She and her  thinks they hear something in the house. \par No longer needs to get MRI \par \par APPT for GYN\par \par \par

## 2021-10-22 NOTE — PHYSICAL EXAM
[No Acute Distress] : no acute distress [Well Nourished] : well nourished [Well Developed] : well developed [Well-Appearing] : well-appearing [Normal Sclera/Conjunctiva] : normal sclera/conjunctiva [PERRL] : pupils equal round and reactive to light [EOMI] : extraocular movements intact [Normal Outer Ear/Nose] : the outer ears and nose were normal in appearance [Normal Oropharynx] : the oropharynx was normal [No JVD] : no jugular venous distention [No Lymphadenopathy] : no lymphadenopathy [Supple] : supple [Thyroid Normal, No Nodules] : the thyroid was normal and there were no nodules present [No Respiratory Distress] : no respiratory distress  [No Accessory Muscle Use] : no accessory muscle use [Clear to Auscultation] : lungs were clear to auscultation bilaterally [Normal Rate] : normal rate  [Regular Rhythm] : with a regular rhythm [Normal S1, S2] : normal S1 and S2 [No Murmur] : no murmur heard [No Carotid Bruits] : no carotid bruits [No Abdominal Bruit] : a ~M bruit was not heard ~T in the abdomen [No Varicosities] : no varicosities [Pedal Pulses Present] : the pedal pulses are present [No Edema] : there was no peripheral edema [No Palpable Aorta] : no palpable aorta [No Extremity Clubbing/Cyanosis] : no extremity clubbing/cyanosis [Soft] : abdomen soft [Non Tender] : non-tender [Non-distended] : non-distended [No Masses] : no abdominal mass palpated [No HSM] : no HSM [Normal Bowel Sounds] : normal bowel sounds [Normal Posterior Cervical Nodes] : no posterior cervical lymphadenopathy [Normal Anterior Cervical Nodes] : no anterior cervical lymphadenopathy [No CVA Tenderness] : no CVA  tenderness [No Spinal Tenderness] : no spinal tenderness [No Joint Swelling] : no joint swelling [Grossly Normal Strength/Tone] : grossly normal strength/tone [No Rash] : no rash [Coordination Grossly Intact] : coordination grossly intact [No Focal Deficits] : no focal deficits [Normal Gait] : normal gait [Deep Tendon Reflexes (DTR)] : deep tendon reflexes were 2+ and symmetric [Normal Affect] : the affect was normal [Normal Insight/Judgement] : insight and judgment were intact [de-identified] : L TMJ  tenderness

## 2021-11-19 ENCOUNTER — RX RENEWAL (OUTPATIENT)
Age: 66
End: 2021-11-19

## 2021-12-09 ENCOUNTER — APPOINTMENT (OUTPATIENT)
Dept: NEUROLOGY | Facility: CLINIC | Age: 66
End: 2021-12-09

## 2021-12-17 ENCOUNTER — APPOINTMENT (OUTPATIENT)
Dept: RHEUMATOLOGY | Facility: CLINIC | Age: 66
End: 2021-12-17
Payer: MEDICARE

## 2021-12-17 DIAGNOSIS — L81.6 OTHER DISORDERS OF DIMINISHED MELANIN FORMATION: ICD-10-CM

## 2021-12-17 DIAGNOSIS — M25.519 PAIN IN UNSPECIFIED SHOULDER: ICD-10-CM

## 2021-12-17 DIAGNOSIS — R20.2 PARESTHESIA OF SKIN: ICD-10-CM

## 2021-12-17 PROCEDURE — 99214 OFFICE O/P EST MOD 30 MIN: CPT

## 2021-12-17 RX ORDER — FOLIC ACID 20 MG
CAPSULE ORAL
Refills: 0 | Status: ACTIVE | COMMUNITY

## 2021-12-18 LAB
25(OH)D3 SERPL-MCNC: 37 NG/ML
ALBUMIN SERPL ELPH-MCNC: 4.3 G/DL
ALP BLD-CCNC: 73 U/L
ALT SERPL-CCNC: 28 U/L
ANION GAP SERPL CALC-SCNC: 12 MMOL/L
AST SERPL-CCNC: 25 U/L
BASOPHILS # BLD AUTO: 0.03 K/UL
BASOPHILS NFR BLD AUTO: 0.5 %
BILIRUB SERPL-MCNC: 0.2 MG/DL
BUN SERPL-MCNC: 20 MG/DL
CALCIUM SERPL-MCNC: 9.4 MG/DL
CHLORIDE SERPL-SCNC: 105 MMOL/L
CO2 SERPL-SCNC: 24 MMOL/L
CREAT SERPL-MCNC: 0.82 MG/DL
CRP SERPL-MCNC: <3 MG/L
EOSINOPHIL # BLD AUTO: 0.32 K/UL
EOSINOPHIL NFR BLD AUTO: 5.5 %
ERYTHROCYTE [SEDIMENTATION RATE] IN BLOOD BY WESTERGREN METHOD: 31 MM/HR
GLUCOSE SERPL-MCNC: 94 MG/DL
HCT VFR BLD CALC: 41.6 %
HGB BLD-MCNC: 13.3 G/DL
IMM GRANULOCYTES NFR BLD AUTO: 0.3 %
LYMPHOCYTES # BLD AUTO: 1 K/UL
LYMPHOCYTES NFR BLD AUTO: 17.1 %
MAN DIFF?: NORMAL
MCHC RBC-ENTMCNC: 29.6 PG
MCHC RBC-ENTMCNC: 32 GM/DL
MCV RBC AUTO: 92.7 FL
MONOCYTES # BLD AUTO: 0.59 K/UL
MONOCYTES NFR BLD AUTO: 10.1 %
NEUTROPHILS # BLD AUTO: 3.9 K/UL
NEUTROPHILS NFR BLD AUTO: 66.5 %
PLATELET # BLD AUTO: 229 K/UL
POTASSIUM SERPL-SCNC: 4.3 MMOL/L
PROT SERPL-MCNC: 7.2 G/DL
RBC # BLD: 4.49 M/UL
RBC # FLD: 13.3 %
SODIUM SERPL-SCNC: 142 MMOL/L
WBC # FLD AUTO: 5.86 K/UL

## 2022-01-12 ENCOUNTER — APPOINTMENT (OUTPATIENT)
Dept: CARDIOLOGY | Facility: CLINIC | Age: 67
End: 2022-01-12

## 2022-01-12 ENCOUNTER — APPOINTMENT (OUTPATIENT)
Dept: NEUROLOGY | Facility: CLINIC | Age: 67
End: 2022-01-12

## 2022-01-21 ENCOUNTER — APPOINTMENT (OUTPATIENT)
Dept: OPHTHALMOLOGY | Facility: CLINIC | Age: 67
End: 2022-01-21
Payer: MEDICARE

## 2022-01-21 ENCOUNTER — NON-APPOINTMENT (OUTPATIENT)
Age: 67
End: 2022-01-21

## 2022-01-21 PROCEDURE — 92004 COMPRE OPH EXAM NEW PT 1/>: CPT

## 2022-01-21 PROCEDURE — 92015 DETERMINE REFRACTIVE STATE: CPT

## 2022-01-31 ENCOUNTER — APPOINTMENT (OUTPATIENT)
Dept: OBGYN | Facility: CLINIC | Age: 67
End: 2022-01-31

## 2022-02-02 ENCOUNTER — APPOINTMENT (OUTPATIENT)
Dept: OPHTHALMOLOGY | Facility: CLINIC | Age: 67
End: 2022-02-02
Payer: MEDICARE

## 2022-02-02 ENCOUNTER — NON-APPOINTMENT (OUTPATIENT)
Age: 67
End: 2022-02-02

## 2022-02-02 PROCEDURE — 92012 INTRM OPH EXAM EST PATIENT: CPT

## 2022-02-16 NOTE — ED ADULT NURSE REASSESSMENT NOTE - NS ED NURSE REASSESS RESPONSE TO CARE
Entered by Aury Solo PA-C on September 17, 2021 5:23:50 AM CDT  ---------------------  From: Aury Solo PA-C   To: [place] [id number] [facility]    Sent: 9/17/2021 5:23:49 AM CDT  Subject: Medication Management     ** Not Approved: Patient no longer under Prescriber care **  sertraline (SERTRALINE HCL 25 MG TABLET)  TAKE 1 TABLET BY MOUTH EVERY DAY  Qty:  30 tab(s)        Days Supply:  30        Refills:  0          Substitutions Allowed     Route To Pharmacy - [place] [id number] [facility]               ** Patient matched by Aury Solo PA-C on 9/17/2021 5:12:29 AM CDT **      ------------------------------------------  From: [place] [id number] [facility]  To: Aury Solo PA-C  Sent: September 17, 2021 12:34:04 AM CDT  Subject: Medication Management  Due: September 17, 2021 11:19:24 PM CDT     ** On Hold Pending Signature **     Dispensed Drug: sertraline (sertraline 25 mg oral tablet), TAKE 1 TABLET BY MOUTH EVERY DAY  Quantity: 30 tab(s)  Days Supply: 30  Refills: 0  Substitutions Allowed  Notes from Pharmacy:  ------------------------------------------  
patient states "ready to go home"/feeling better

## 2022-03-08 ENCOUNTER — NON-APPOINTMENT (OUTPATIENT)
Age: 67
End: 2022-03-08

## 2022-03-08 ENCOUNTER — APPOINTMENT (OUTPATIENT)
Dept: INTERNAL MEDICINE | Facility: CLINIC | Age: 67
End: 2022-03-08
Payer: MEDICARE

## 2022-03-08 VITALS
OXYGEN SATURATION: 98 % | DIASTOLIC BLOOD PRESSURE: 84 MMHG | HEIGHT: 61 IN | BODY MASS INDEX: 27.56 KG/M2 | HEART RATE: 72 BPM | WEIGHT: 146 LBS | SYSTOLIC BLOOD PRESSURE: 146 MMHG | TEMPERATURE: 98.4 F

## 2022-03-08 VITALS — DIASTOLIC BLOOD PRESSURE: 68 MMHG | SYSTOLIC BLOOD PRESSURE: 110 MMHG

## 2022-03-08 DIAGNOSIS — R44.0 AUDITORY HALLUCINATIONS: ICD-10-CM

## 2022-03-08 DIAGNOSIS — N64.4 MASTODYNIA: ICD-10-CM

## 2022-03-08 PROCEDURE — 99213 OFFICE O/P EST LOW 20 MIN: CPT | Mod: 25

## 2022-03-08 PROCEDURE — 93000 ELECTROCARDIOGRAM COMPLETE: CPT

## 2022-03-08 PROCEDURE — G0439: CPT

## 2022-03-08 NOTE — ASSESSMENT
[FreeTextEntry1] : Reproducible L breast tenderness and L rhomboid tenderness  Reproduces exactly her CC although she reports "pressure". Pressure at home only seconds and nonexertional. Always at rest\par \par Consider hold or 1/2 statin or substitute newer statin for nonobstructive CAD\par WARM compress to L breast   Check CPK no evidence of rhabdomyolysis\par ECG no evidence of acute ischemia.  Cardiology 9 March 2022\par \par RA

## 2022-03-08 NOTE — PHYSICAL EXAM
[No Acute Distress] : no acute distress [Well Nourished] : well nourished [Well Developed] : well developed [Well-Appearing] : well-appearing [Normal Sclera/Conjunctiva] : normal sclera/conjunctiva [PERRL] : pupils equal round and reactive to light [EOMI] : extraocular movements intact [Normal Outer Ear/Nose] : the outer ears and nose were normal in appearance [Normal Oropharynx] : the oropharynx was normal [No JVD] : no jugular venous distention [No Lymphadenopathy] : no lymphadenopathy [Supple] : supple [Thyroid Normal, No Nodules] : the thyroid was normal and there were no nodules present [No Respiratory Distress] : no respiratory distress  [No Accessory Muscle Use] : no accessory muscle use [Clear to Auscultation] : lungs were clear to auscultation bilaterally [Normal Rate] : normal rate  [Regular Rhythm] : with a regular rhythm [Normal S1, S2] : normal S1 and S2 [No Murmur] : no murmur heard [No Carotid Bruits] : no carotid bruits [No Abdominal Bruit] : a ~M bruit was not heard ~T in the abdomen [No Varicosities] : no varicosities [Pedal Pulses Present] : the pedal pulses are present [No Edema] : there was no peripheral edema [No Palpable Aorta] : no palpable aorta [No Extremity Clubbing/Cyanosis] : no extremity clubbing/cyanosis [Soft] : abdomen soft [Non Tender] : non-tender [Non-distended] : non-distended [No Masses] : no abdominal mass palpated [No HSM] : no HSM [Normal Bowel Sounds] : normal bowel sounds [Normal Posterior Cervical Nodes] : no posterior cervical lymphadenopathy [Normal Anterior Cervical Nodes] : no anterior cervical lymphadenopathy [No CVA Tenderness] : no CVA  tenderness [No Spinal Tenderness] : no spinal tenderness [No Joint Swelling] : no joint swelling [Grossly Normal Strength/Tone] : grossly normal strength/tone [No Rash] : no rash [Coordination Grossly Intact] : coordination grossly intact [No Focal Deficits] : no focal deficits [Normal Gait] : normal gait [Deep Tendon Reflexes (DTR)] : deep tendon reflexes were 2+ and symmetric [Normal Affect] : the affect was normal [Normal Insight/Judgement] : insight and judgment were intact [de-identified] : reproducible [de-identified] : L BREAST TENDERNESS reproducing same pressure and ache in her chief complaint   [de-identified] : L rhomboid spasm and tenderness reprodicing same pain experience in chief complaint  rheumatoid nodule L Achilles  [place] neck deformity and boutineer deformity BL

## 2022-03-08 NOTE — HISTORY OF PRESENT ILLNESS
[FreeTextEntry1] : CPE [de-identified] : 66 yo  Very Poor Historian\par \par Nonocclusive CAD  increased statin.\par Three weeks ago 3 times a week left sided chest pressure non exertional when sleeping  or during the day watching TV \par Reports 7/10 pressure and ACHE x seconds  no SOB, no diaphoresis, no palpitations. Reports chronic MCGINNIS. Walks for an hour without chest pressure.\par Also reports cramping pain in her legs.  Admits to MCGINNIS but symptoms are NOT ACUTE\par Has walked for over 60 minutes without chest pressure without cardiac symptoms. Initially "YES" to palpitations but palpitations ONLY when anxious about L pain (Inconsistent palpitations Mostly when CP NO PALPIATTAIONS)\par \par \par \par

## 2022-03-09 LAB
ALBUMIN SERPL ELPH-MCNC: 4.7 G/DL
ALP BLD-CCNC: 55 U/L
ALT SERPL-CCNC: 22 U/L
ANION GAP SERPL CALC-SCNC: 12 MMOL/L
AST SERPL-CCNC: 21 U/L
BILIRUB SERPL-MCNC: 0.4 MG/DL
BUN SERPL-MCNC: 13 MG/DL
CALCIUM SERPL-MCNC: 9.9 MG/DL
CHLORIDE SERPL-SCNC: 102 MMOL/L
CK SERPL-CCNC: 111 U/L
CO2 SERPL-SCNC: 27 MMOL/L
CREAT SERPL-MCNC: 0.82 MG/DL
EGFR: 78 ML/MIN/1.73M2
ESTIMATED AVERAGE GLUCOSE: 120 MG/DL
GLUCOSE SERPL-MCNC: 86 MG/DL
HBA1C MFR BLD HPLC: 5.8 %
POTASSIUM SERPL-SCNC: 4.4 MMOL/L
PROT SERPL-MCNC: 7.8 G/DL
SODIUM SERPL-SCNC: 141 MMOL/L

## 2022-03-10 LAB
CHOLEST SERPL-MCNC: 151 MG/DL
HDLC SERPL-MCNC: 66 MG/DL
LDLC SERPL CALC-MCNC: 61 MG/DL
NONHDLC SERPL-MCNC: 85 MG/DL
TRIGL SERPL-MCNC: 121 MG/DL

## 2022-03-23 ENCOUNTER — OUTPATIENT (OUTPATIENT)
Dept: OUTPATIENT SERVICES | Facility: HOSPITAL | Age: 67
LOS: 1 days | End: 2022-03-23
Payer: MEDICARE

## 2022-03-23 ENCOUNTER — RESULT REVIEW (OUTPATIENT)
Age: 67
End: 2022-03-23

## 2022-03-23 ENCOUNTER — APPOINTMENT (OUTPATIENT)
Dept: MAMMOGRAPHY | Facility: IMAGING CENTER | Age: 67
End: 2022-03-23
Payer: MEDICARE

## 2022-03-23 DIAGNOSIS — Z12.39 ENCOUNTER FOR OTHER SCREENING FOR MALIGNANT NEOPLASM OF BREAST: ICD-10-CM

## 2022-03-23 PROCEDURE — 77063 BREAST TOMOSYNTHESIS BI: CPT

## 2022-03-23 PROCEDURE — 77067 SCR MAMMO BI INCL CAD: CPT

## 2022-03-23 PROCEDURE — 77063 BREAST TOMOSYNTHESIS BI: CPT | Mod: 26

## 2022-03-23 PROCEDURE — 77067 SCR MAMMO BI INCL CAD: CPT | Mod: 26

## 2022-03-29 ENCOUNTER — APPOINTMENT (OUTPATIENT)
Dept: RHEUMATOLOGY | Facility: CLINIC | Age: 67
End: 2022-03-29
Payer: MEDICARE

## 2022-03-29 VITALS
DIASTOLIC BLOOD PRESSURE: 82 MMHG | WEIGHT: 148 LBS | HEIGHT: 61 IN | TEMPERATURE: 97.2 F | HEART RATE: 62 BPM | OXYGEN SATURATION: 96 % | BODY MASS INDEX: 27.94 KG/M2 | SYSTOLIC BLOOD PRESSURE: 124 MMHG

## 2022-03-29 LAB
25(OH)D3 SERPL-MCNC: 48.8 NG/ML
ALBUMIN SERPL ELPH-MCNC: 4.3 G/DL
ALP BLD-CCNC: 62 U/L
ALT SERPL-CCNC: 26 U/L
ANION GAP SERPL CALC-SCNC: 11 MMOL/L
AST SERPL-CCNC: 24 U/L
BASOPHILS # BLD AUTO: 0.03 K/UL
BASOPHILS NFR BLD AUTO: 0.4 %
BILIRUB SERPL-MCNC: 0.3 MG/DL
BUN SERPL-MCNC: 13 MG/DL
CALCIUM SERPL-MCNC: 9.4 MG/DL
CHLORIDE SERPL-SCNC: 105 MMOL/L
CO2 SERPL-SCNC: 24 MMOL/L
CREAT SERPL-MCNC: 0.82 MG/DL
CRP SERPL-MCNC: <3 MG/L
EGFR: 78 ML/MIN/1.73M2
EOSINOPHIL # BLD AUTO: 0.35 K/UL
EOSINOPHIL NFR BLD AUTO: 4.8 %
ERYTHROCYTE [SEDIMENTATION RATE] IN BLOOD BY WESTERGREN METHOD: 35 MM/HR
GLUCOSE SERPL-MCNC: 107 MG/DL
HCT VFR BLD CALC: 40.8 %
HGB BLD-MCNC: 13.1 G/DL
IMM GRANULOCYTES NFR BLD AUTO: 0.3 %
LYMPHOCYTES # BLD AUTO: 1.34 K/UL
LYMPHOCYTES NFR BLD AUTO: 18.4 %
MAN DIFF?: NORMAL
MCHC RBC-ENTMCNC: 29.9 PG
MCHC RBC-ENTMCNC: 32.1 GM/DL
MCV RBC AUTO: 93.2 FL
MONOCYTES # BLD AUTO: 0.66 K/UL
MONOCYTES NFR BLD AUTO: 9.1 %
NEUTROPHILS # BLD AUTO: 4.89 K/UL
NEUTROPHILS NFR BLD AUTO: 67 %
PLATELET # BLD AUTO: 221 K/UL
POTASSIUM SERPL-SCNC: 5 MMOL/L
PROT SERPL-MCNC: 7.3 G/DL
RBC # BLD: 4.38 M/UL
RBC # FLD: 13 %
SODIUM SERPL-SCNC: 140 MMOL/L
WBC # FLD AUTO: 7.29 K/UL

## 2022-03-29 PROCEDURE — 99215 OFFICE O/P EST HI 40 MIN: CPT

## 2022-03-29 PROCEDURE — 99214 OFFICE O/P EST MOD 30 MIN: CPT

## 2022-03-29 RX ORDER — TOFACITINIB 11 MG/1
11 TABLET, FILM COATED, EXTENDED RELEASE ORAL
Qty: 90 | Refills: 0 | Status: DISCONTINUED | COMMUNITY
Start: 2018-06-13 | End: 2022-03-29

## 2022-03-29 RX ORDER — NEOMYCIN AND POLYMYXIN B SULFATES AND DEXAMETHASONE 3.5; 10000; 1 MG/G; [IU]/G; MG/G
3.5-10000-0.1 OINTMENT OPHTHALMIC
Qty: 4 | Refills: 0 | Status: ACTIVE | COMMUNITY
Start: 2022-01-21

## 2022-03-29 RX ORDER — ZOLEDRONIC ACID 5 MG/100ML
5 INJECTION INTRAVENOUS
Qty: 100 | Refills: 0 | Status: DISCONTINUED | OUTPATIENT
Start: 2021-01-20 | End: 2022-03-29

## 2022-04-06 ENCOUNTER — NON-APPOINTMENT (OUTPATIENT)
Age: 67
End: 2022-04-06

## 2022-04-06 ENCOUNTER — APPOINTMENT (OUTPATIENT)
Dept: CARDIOLOGY | Facility: CLINIC | Age: 67
End: 2022-04-06
Payer: MEDICARE

## 2022-04-06 VITALS
OXYGEN SATURATION: 98 % | WEIGHT: 151 LBS | DIASTOLIC BLOOD PRESSURE: 80 MMHG | HEIGHT: 61 IN | TEMPERATURE: 98.4 F | HEART RATE: 64 BPM | BODY MASS INDEX: 28.51 KG/M2 | SYSTOLIC BLOOD PRESSURE: 130 MMHG

## 2022-04-06 PROCEDURE — 99214 OFFICE O/P EST MOD 30 MIN: CPT

## 2022-04-06 PROCEDURE — 93000 ELECTROCARDIOGRAM COMPLETE: CPT

## 2022-04-06 NOTE — HISTORY OF PRESENT ILLNESS
[FreeTextEntry1] : 4/6/2022\par She complains of chest pressure\par Back pains\par Tired\par  If she moves her arms and shoulders she has pain in the chest and the arms\par Hard to sleep on the left side, She has a tightness in the area\par Fatigue with exertion\par Yesterday she cleaned her bathroom and had pain in the back\par No leg swelling. \par Exertion does not cause chest pain.\par She does get tired with exertion.\par \par She needs to have a humira infusion\par Seen by Dr. Pacheco recently \par Xeljanz is off\par \par \par Medications:\par Famotidine\par Prednisone 2.5 mg daily \par Meclizine \par Coreg 3.125mg BID\par atorvastatin 80\par Folic acid\par Aspirin 81mg daily \par \par \par 10/20/2021\par Doing ok, no exertional chest pain or sob\par Muscles are aching her in the chest and the arm\par no leg swelling\par She is on aspirin and statin therapy\par BP is well controlled\par \par \par CT heart coronary 9/1/2021\par Mid LAD 50%\par Cx 30%\par \par Echo:  EF 63%, mild MR, normal LV size and function, borderline pHTN\par \par Medications:\par Xeljanz\par Famotidine\par Prednisone\par Meclizine \par Coreg 3.125mg BID\par atorvastatin 40\par Folic acid\par Aspirin 81mg daily

## 2022-04-06 NOTE — DISCUSSION/SUMMARY
[FreeTextEntry1] : Assessment:\par 1.  Coronary Atherosclerosis\par 2.  Chest pressure - some improvement with coreg\par 3.  + Calcium score\par 4.  Family history of CAD\par 5.  RA\par 6.  Anxiety.\par \par \par  Plan:\par 1.  Continue medical therapy for now with:\par - antiplatelet - aspirin \par - statin - keep on 80mg daily \par - beta blocker - coreg \par 2. LDL is well controlled\par 3.  Atypical chest pains, worse with movement of the arms, point tenderness\par 4.  Echo to assure no wall motion abnormalities.  \par 5.  Anticipate normal cardiac testing\par 6.  Followup with Dr. Pacheco for msk pains

## 2022-04-18 ENCOUNTER — RX RENEWAL (OUTPATIENT)
Age: 67
End: 2022-04-18

## 2022-04-19 ENCOUNTER — APPOINTMENT (OUTPATIENT)
Dept: RHEUMATOLOGY | Facility: CLINIC | Age: 67
End: 2022-04-19
Payer: MEDICAID

## 2022-04-19 VITALS
WEIGHT: 148 LBS | SYSTOLIC BLOOD PRESSURE: 141 MMHG | BODY MASS INDEX: 25.27 KG/M2 | HEART RATE: 82 BPM | DIASTOLIC BLOOD PRESSURE: 85 MMHG | TEMPERATURE: 97.2 F | RESPIRATION RATE: 18 BRPM | HEIGHT: 64 IN | OXYGEN SATURATION: 98 %

## 2022-04-19 PROCEDURE — 96372 THER/PROPH/DIAG INJ SC/IM: CPT

## 2022-04-19 RX ORDER — ABATACEPT 125 MG/ML
125 INJECTION, SOLUTION SUBCUTANEOUS
Qty: 0 | Refills: 0 | Status: COMPLETED | OUTPATIENT
Start: 2022-04-19

## 2022-04-19 RX ADMIN — ABATACEPT 125 MG/ML: 125 INJECTION, SOLUTION SUBCUTANEOUS at 00:00

## 2022-04-25 ENCOUNTER — APPOINTMENT (OUTPATIENT)
Dept: RHEUMATOLOGY | Facility: CLINIC | Age: 67
End: 2022-04-25
Payer: MEDICARE

## 2022-04-25 PROCEDURE — 96413 CHEMO IV INFUSION 1 HR: CPT

## 2022-04-25 PROCEDURE — 96365 THER/PROPH/DIAG IV INF INIT: CPT

## 2022-04-25 RX ORDER — ABATACEPT 125 MG/ML
125 INJECTION, SOLUTION SUBCUTANEOUS
Refills: 0 | Status: COMPLETED | OUTPATIENT
Start: 2022-04-25

## 2022-04-25 RX ADMIN — ABATACEPT 0 MG/ML: 125 INJECTION, SOLUTION SUBCUTANEOUS at 00:00

## 2022-05-14 ENCOUNTER — RX RENEWAL (OUTPATIENT)
Age: 67
End: 2022-05-14

## 2022-05-19 ENCOUNTER — NON-APPOINTMENT (OUTPATIENT)
Age: 67
End: 2022-05-19

## 2022-05-19 ENCOUNTER — APPOINTMENT (OUTPATIENT)
Dept: OPHTHALMOLOGY | Facility: CLINIC | Age: 67
End: 2022-05-19
Payer: MEDICARE

## 2022-05-19 PROCEDURE — 92014 COMPRE OPH EXAM EST PT 1/>: CPT

## 2022-05-20 ENCOUNTER — APPOINTMENT (OUTPATIENT)
Dept: INTERNAL MEDICINE | Facility: CLINIC | Age: 67
End: 2022-05-20
Payer: MEDICARE

## 2022-05-20 VITALS
BODY MASS INDEX: 23.9 KG/M2 | HEART RATE: 65 BPM | TEMPERATURE: 97.5 F | SYSTOLIC BLOOD PRESSURE: 129 MMHG | OXYGEN SATURATION: 99 % | DIASTOLIC BLOOD PRESSURE: 79 MMHG | HEIGHT: 64 IN | WEIGHT: 140 LBS

## 2022-05-20 DIAGNOSIS — G62.9 POLYNEUROPATHY, UNSPECIFIED: ICD-10-CM

## 2022-05-20 PROCEDURE — 99214 OFFICE O/P EST MOD 30 MIN: CPT | Mod: 25

## 2022-05-20 NOTE — ASSESSMENT
[FreeTextEntry1] : RA  prednisone/orencia since April 2022 Xejance replaced\par Tingling in the more affected LLE foot where toes are severely deformed and L leg more EDEMA\par Edema L>R may cause paresthesias. Doubt DM/thyroid\par Possibility orencia causing neuropathy timing after orencia\par \par  Non obstructive CAD and chronic RA:  statin/ASA\par \par Osteoporosis   Zoledronic Q 12 MONTHS\par \par \par

## 2022-05-20 NOTE — HISTORY OF PRESENT ILLNESS
[FreeTextEntry1] : CAD [de-identified] : Language [place]ik\par  [id number]  \par [place]\par \par 66 yo  woman nonocclusive CAD increased statin  \par Cramping pains in extremities 2/2 statin therapy higher dosage\par \par Started 1 week ago\par When turn head last seconds  No tinnitus\par Continued mm pain\par \par stopped xeijanz and replaced w orencia  x 4 monthly No reaction\par Xejiance increases risk of CAD CVA MI  HTN discontinued\par single jection for osteoporosis\par \par LLE foot burning for 2-3 weeks. Concerned about DM. HbA1c 5.8\par \par

## 2022-05-21 LAB
ANION GAP SERPL CALC-SCNC: 14 MMOL/L
BUN SERPL-MCNC: 15 MG/DL
CALCIUM SERPL-MCNC: 9.4 MG/DL
CHLORIDE SERPL-SCNC: 102 MMOL/L
CO2 SERPL-SCNC: 25 MMOL/L
CREAT SERPL-MCNC: 0.74 MG/DL
EGFR: 89 ML/MIN/1.73M2
ESTIMATED AVERAGE GLUCOSE: 131 MG/DL
GLUCOSE SERPL-MCNC: 91 MG/DL
HBA1C MFR BLD HPLC: 6.2 %
POTASSIUM SERPL-SCNC: 4.2 MMOL/L
SODIUM SERPL-SCNC: 141 MMOL/L

## 2022-06-01 ENCOUNTER — APPOINTMENT (OUTPATIENT)
Dept: OBGYN | Facility: CLINIC | Age: 67
End: 2022-06-01

## 2022-06-01 VITALS
DIASTOLIC BLOOD PRESSURE: 75 MMHG | SYSTOLIC BLOOD PRESSURE: 114 MMHG | BODY MASS INDEX: 23.9 KG/M2 | HEIGHT: 64 IN | WEIGHT: 140 LBS

## 2022-06-01 PROCEDURE — 99387 INIT PM E/M NEW PAT 65+ YRS: CPT

## 2022-06-02 ENCOUNTER — OUTPATIENT (OUTPATIENT)
Dept: OUTPATIENT SERVICES | Facility: HOSPITAL | Age: 67
LOS: 1 days | End: 2022-06-02
Payer: MEDICARE

## 2022-06-02 ENCOUNTER — APPOINTMENT (OUTPATIENT)
Dept: ULTRASOUND IMAGING | Facility: CLINIC | Age: 67
End: 2022-06-02
Payer: MEDICARE

## 2022-06-02 DIAGNOSIS — R06.9 UNSPECIFIED ABNORMALITIES OF BREATHING: ICD-10-CM

## 2022-06-02 PROCEDURE — 93971 EXTREMITY STUDY: CPT | Mod: 26

## 2022-06-02 PROCEDURE — 93971 EXTREMITY STUDY: CPT

## 2022-06-05 LAB — CYTOLOGY CVX/VAG DOC THIN PREP: ABNORMAL

## 2022-06-10 ENCOUNTER — OUTPATIENT (OUTPATIENT)
Dept: OUTPATIENT SERVICES | Facility: HOSPITAL | Age: 67
LOS: 1 days | End: 2022-06-10
Payer: MEDICARE

## 2022-06-10 ENCOUNTER — APPOINTMENT (OUTPATIENT)
Dept: CV DIAGNOSITCS | Facility: HOSPITAL | Age: 67
End: 2022-06-10

## 2022-06-10 DIAGNOSIS — R07.9 CHEST PAIN, UNSPECIFIED: ICD-10-CM

## 2022-06-10 PROCEDURE — 93306 TTE W/DOPPLER COMPLETE: CPT

## 2022-06-10 PROCEDURE — 93306 TTE W/DOPPLER COMPLETE: CPT | Mod: 26

## 2022-06-13 ENCOUNTER — APPOINTMENT (OUTPATIENT)
Dept: RHEUMATOLOGY | Facility: CLINIC | Age: 67
End: 2022-06-13
Payer: MEDICARE

## 2022-06-13 ENCOUNTER — NON-APPOINTMENT (OUTPATIENT)
Age: 67
End: 2022-06-13

## 2022-06-13 ENCOUNTER — APPOINTMENT (OUTPATIENT)
Dept: OPHTHALMOLOGY | Facility: CLINIC | Age: 67
End: 2022-06-13
Payer: MEDICARE

## 2022-06-13 VITALS
WEIGHT: 139 LBS | DIASTOLIC BLOOD PRESSURE: 81 MMHG | TEMPERATURE: 97.5 F | SYSTOLIC BLOOD PRESSURE: 130 MMHG | HEART RATE: 67 BPM | HEIGHT: 64 IN | OXYGEN SATURATION: 98 % | BODY MASS INDEX: 23.73 KG/M2

## 2022-06-13 LAB
25(OH)D3 SERPL-MCNC: 40 NG/ML
ALBUMIN SERPL ELPH-MCNC: 4.4 G/DL
ALP BLD-CCNC: 78 U/L
ALT SERPL-CCNC: 12 U/L
ANION GAP SERPL CALC-SCNC: 13 MMOL/L
AST SERPL-CCNC: 17 U/L
BASOPHILS # BLD AUTO: 0.04 K/UL
BASOPHILS NFR BLD AUTO: 0.5 %
BILIRUB SERPL-MCNC: 0.2 MG/DL
BUN SERPL-MCNC: 18 MG/DL
CALCIUM SERPL-MCNC: 10 MG/DL
CHLORIDE SERPL-SCNC: 103 MMOL/L
CO2 SERPL-SCNC: 26 MMOL/L
CREAT SERPL-MCNC: 0.8 MG/DL
CRP SERPL-MCNC: 3 MG/L
EGFR: 81 ML/MIN/1.73M2
EOSINOPHIL # BLD AUTO: 0.34 K/UL
EOSINOPHIL NFR BLD AUTO: 4 %
FOLATE SERPL-MCNC: >20 NG/ML
GLUCOSE SERPL-MCNC: 91 MG/DL
HCT VFR BLD CALC: 42.5 %
HGB BLD-MCNC: 13.4 G/DL
IMM GRANULOCYTES NFR BLD AUTO: 0.2 %
LYMPHOCYTES # BLD AUTO: 1.92 K/UL
LYMPHOCYTES NFR BLD AUTO: 22.6 %
MAN DIFF?: NORMAL
MCHC RBC-ENTMCNC: 29 PG
MCHC RBC-ENTMCNC: 31.5 GM/DL
MCV RBC AUTO: 92 FL
MONOCYTES # BLD AUTO: 0.81 K/UL
MONOCYTES NFR BLD AUTO: 9.5 %
NEUTROPHILS # BLD AUTO: 5.36 K/UL
NEUTROPHILS NFR BLD AUTO: 63.2 %
PLATELET # BLD AUTO: 273 K/UL
POTASSIUM SERPL-SCNC: 4.4 MMOL/L
PROT SERPL-MCNC: 7.8 G/DL
RBC # BLD: 4.62 M/UL
RBC # FLD: 12.9 %
SODIUM SERPL-SCNC: 142 MMOL/L
VIT B12 SERPL-MCNC: >2000 PG/ML
WBC # FLD AUTO: 8.49 K/UL

## 2022-06-13 PROCEDURE — 92012 INTRM OPH EXAM EST PATIENT: CPT

## 2022-06-13 PROCEDURE — 99214 OFFICE O/P EST MOD 30 MIN: CPT

## 2022-06-13 PROCEDURE — 95060 OPH MUCOUS MEMBRANE TESTS: CPT

## 2022-06-14 LAB — ERYTHROCYTE [SEDIMENTATION RATE] IN BLOOD BY WESTERGREN METHOD: 29 MM/HR

## 2022-06-22 ENCOUNTER — APPOINTMENT (OUTPATIENT)
Dept: NEUROLOGY | Facility: CLINIC | Age: 67
End: 2022-06-22
Payer: MEDICARE

## 2022-06-22 VITALS
BODY MASS INDEX: 23.56 KG/M2 | HEART RATE: 69 BPM | WEIGHT: 138 LBS | DIASTOLIC BLOOD PRESSURE: 73 MMHG | SYSTOLIC BLOOD PRESSURE: 111 MMHG | HEIGHT: 64 IN

## 2022-06-22 PROCEDURE — 99203 OFFICE O/P NEW LOW 30 MIN: CPT

## 2022-06-24 NOTE — REVIEW OF SYSTEMS
[Numbness] : numbness [Abnormal Sensation] : an abnormal sensation [Tension Headache] : tension-type headaches [Anxiety] : anxiety [As Noted in HPI] : as noted in HPI [Arthralgias] : arthralgias [Joint Pain] : joint pain [Joint Swelling] : joint swelling [Joint Stiffness] : joint stiffness [Negative] : Heme/Lymph

## 2022-06-24 NOTE — DATA REVIEWED
[de-identified] : I reviewed extensive electronic medical records of the reports of Dr. Moulton and Dr. Pacheco including significant lab tests and imaging studies which are noncontributory.

## 2022-06-24 NOTE — DISCUSSION/SUMMARY
[FreeTextEntry1] : Opinion–the patient has advanced rheumatoid arthritis and has multiple entrapment syndromes of the ulnar or median and possibly posterior tibial nerve behind the medial malleolus including possibility of interdigital neuritis typically seen in rheumatoid arthritis and should have a thorough electrodiagnostic study of the upper and lower extremities to grade the severity distribution and the pattern of neuropathic changes and following the aforementioned I will embark on further investigations if necessary and treat her appropriately and I had an extensive conversation with her and she expressed understanding and will comply and has an appointment given to her for electrophysiologic testing meanwhile she will return back to her physicians for the treatment of her medical ailments.

## 2022-06-24 NOTE — HISTORY OF PRESENT ILLNESS
[FreeTextEntry1] : Ms. Pavon is a 67-year-old lady who stated that she was referred by Dr. Moulton and has electrical sensation in the feet with numbness since June 2021 without history of diabetes and is usually worse at night and has history of rheumatoid arthritis in both feet and hands and she has significant osteoarthritic disease as well and the symptoms of numbness and electrical sensations are usually limited to the distal lower extremity worse on the left and nights are usually worse.  She has never been evaluated by a neurologist and did not have any electrophysiologic testing.  Additional history reveals that she has numbness in both hands at night since last 2 months slightly worse on the right and has been treated for rheumatoid arthritis by Dr. Pacheco.  She occasionally gets left-sided headache which is chronic and occasional tinnitus and extensive investigations including MRI of the brain have been unremarkable.\par \par Past medical history is pertinent for hypercholesterolemia and is being treated with atorvastatin and cardiac disease treated with carvedilol has no history of surgical procedures.  I reviewed her medications and allergies she does not work lives with the family.\par \par I reviewed her medications and allergies and extensive documented history in the electronic medical records.  She denied any other neurological symptoms.  I reviewed extensive electronic medical records

## 2022-06-24 NOTE — PHYSICAL EXAM
[FreeTextEntry1] : General examination is unremarkable with stable vital signs.  Head neck, ear nose and throat is unremarkable.  There is no carotid bruit thyromegaly or lymphadenopathy.  Chest is clear and heart sounds are normal.  She has very severe osteoarthritic/rheumatoid arthritic disease with atrophy of the small muscles of the hand deformity including foot deformity and muscle atrophy.  During the evaluation cervical spine range of motion while unremarkable induced some left paracervical discomfort.  There are no meningeal signs pedal pulsations are preserved.\par \par Neurologically memory language cognitive and behavioral functions are normal and cranial nerves are normal with normal optic disks full visual fields brisk pupils full extraocular movements no facial weakness bulbar functions are intact and neck muscles are 5/5.\par \par Her motor evaluation is significantly abnormal with atrophy of the ulnar innervated muscles bilaterally including decreased sensation in the ulnar nerve distribution whereas there is also atrophy of the abductor pollicis brevis bilaterally with weakness of all intrinsic muscles of the hand and weak sensory dysfunction in the hands including the thumb index and middle finger.  In the lower extremity medial plantar atrophy and deformity is noted in the toes.  There is also symmetric distal sensory abnormality in the upper and lower extremities and there are diffuse Tinel signs over the ulnar median and posterior tibial nerve behind the medial malleolus.  There are no independent fasciculations in any of the muscles and all other muscles appear to be relatively intact mostly in the proximal muscles.  Her reflexes are 1+ in the upper extremity knee reflexes are 2+ on the right 1+ on the left and ankles are absent.  Gait is unimpaired Romberg sign is negative and in the presence of significant atrophy of the hands and feet muscles it is very difficult to discern different groups of mono neuritic muscle denervation findings.

## 2022-07-11 ENCOUNTER — APPOINTMENT (OUTPATIENT)
Dept: OPHTHALMOLOGY | Facility: CLINIC | Age: 67
End: 2022-07-11

## 2022-07-11 ENCOUNTER — NON-APPOINTMENT (OUTPATIENT)
Age: 67
End: 2022-07-11

## 2022-07-11 PROCEDURE — 92012 INTRM OPH EXAM EST PATIENT: CPT

## 2022-07-26 ENCOUNTER — LABORATORY RESULT (OUTPATIENT)
Age: 67
End: 2022-07-26

## 2022-07-26 ENCOUNTER — APPOINTMENT (OUTPATIENT)
Dept: RHEUMATOLOGY | Facility: CLINIC | Age: 67
End: 2022-07-26
Payer: MEDICARE

## 2022-07-26 VITALS
HEIGHT: 64 IN | DIASTOLIC BLOOD PRESSURE: 79 MMHG | RESPIRATION RATE: 16 BRPM | BODY MASS INDEX: 23.32 KG/M2 | TEMPERATURE: 97.7 F | SYSTOLIC BLOOD PRESSURE: 117 MMHG | WEIGHT: 136.6 LBS | OXYGEN SATURATION: 98 % | HEART RATE: 61 BPM

## 2022-07-26 PROCEDURE — 99215 OFFICE O/P EST HI 40 MIN: CPT | Mod: 25

## 2022-07-26 RX ORDER — SODIUM CHLORIDE, SODIUM LACTATE, POTASSIUM CHLORIDE, CALCIUM CHLORIDE 20; 30; 600; 310 MG/100ML; MG/100ML; MG/100ML; MG/100ML
INJECTION, SOLUTION INTRAVENOUS
Qty: 10 | Refills: 0 | Status: ACTIVE | COMMUNITY
Start: 2022-06-13

## 2022-07-27 LAB
ALBUMIN SERPL ELPH-MCNC: 4.2 G/DL
ALP BLD-CCNC: 70 U/L
ALT SERPL-CCNC: 12 U/L
ANION GAP SERPL CALC-SCNC: 13 MMOL/L
AST SERPL-CCNC: 20 U/L
BASOPHILS # BLD AUTO: 0.03 K/UL
BASOPHILS NFR BLD AUTO: 0.4 %
BILIRUB SERPL-MCNC: 0.3 MG/DL
BUN SERPL-MCNC: 14 MG/DL
CALCIUM SERPL-MCNC: 9.8 MG/DL
CHLORIDE SERPL-SCNC: 101 MMOL/L
CO2 SERPL-SCNC: 26 MMOL/L
CREAT SERPL-MCNC: 0.75 MG/DL
CRP SERPL-MCNC: <3 MG/L
EGFR: 87 ML/MIN/1.73M2
EOSINOPHIL # BLD AUTO: 0.27 K/UL
EOSINOPHIL NFR BLD AUTO: 3.7 %
ERYTHROCYTE [SEDIMENTATION RATE] IN BLOOD BY WESTERGREN METHOD: 31 MM/HR
GLUCOSE SERPL-MCNC: 75 MG/DL
HCT VFR BLD CALC: 43.1 %
HGB BLD-MCNC: 13.9 G/DL
IGA SER QL IEP: 597 MG/DL
IGG SER QL IEP: 1595 MG/DL
IGM SER QL IEP: 65 MG/DL
IMM GRANULOCYTES NFR BLD AUTO: 0.3 %
LYMPHOCYTES # BLD AUTO: 1.67 K/UL
LYMPHOCYTES NFR BLD AUTO: 22.9 %
MAN DIFF?: NORMAL
MCHC RBC-ENTMCNC: 28.8 PG
MCHC RBC-ENTMCNC: 32.3 GM/DL
MCV RBC AUTO: 89.2 FL
MONOCYTES # BLD AUTO: 0.52 K/UL
MONOCYTES NFR BLD AUTO: 7.1 %
NEUTROPHILS # BLD AUTO: 4.79 K/UL
NEUTROPHILS NFR BLD AUTO: 65.6 %
PLATELET # BLD AUTO: 273 K/UL
POTASSIUM SERPL-SCNC: 4.4 MMOL/L
PROT SERPL-MCNC: 7.9 G/DL
RBC # BLD: 4.83 M/UL
RBC # FLD: 13.2 %
SODIUM SERPL-SCNC: 140 MMOL/L
WBC # FLD AUTO: 7.3 K/UL

## 2022-08-01 ENCOUNTER — NON-APPOINTMENT (OUTPATIENT)
Age: 67
End: 2022-08-01

## 2022-08-11 RX ORDER — RITUXIMAB-ABBS 10 MG/ML
500 INJECTION, SOLUTION INTRAVENOUS
Qty: 0 | Refills: 0 | Status: COMPLETED | OUTPATIENT
Start: 2022-08-11 | End: 1900-01-01

## 2022-08-11 RX ORDER — ACETAMINOPHEN 325 MG/1
325 TABLET ORAL
Qty: 0 | Refills: 0 | Status: COMPLETED | OUTPATIENT
Start: 2022-08-11 | End: 1900-01-01

## 2022-08-11 RX ORDER — CETIRIZINE HYDROCHLORIDE 10 MG/1
10 TABLET, COATED ORAL
Qty: 0 | Refills: 0 | Status: COMPLETED | OUTPATIENT
Start: 2022-08-11 | End: 1900-01-01

## 2022-08-11 RX ORDER — METHYLPREDNISOLONE 125 MG/2ML
125 INJECTION, POWDER, LYOPHILIZED, FOR SOLUTION INTRAMUSCULAR; INTRAVENOUS
Qty: 0 | Refills: 0 | Status: COMPLETED | OUTPATIENT
Start: 2022-08-11 | End: 1900-01-01

## 2022-08-23 ENCOUNTER — NON-APPOINTMENT (OUTPATIENT)
Age: 67
End: 2022-08-23

## 2022-08-23 ENCOUNTER — APPOINTMENT (OUTPATIENT)
Dept: OPHTHALMOLOGY | Facility: CLINIC | Age: 67
End: 2022-08-23

## 2022-08-23 PROCEDURE — 92012 INTRM OPH EXAM EST PATIENT: CPT

## 2022-08-29 ENCOUNTER — LABORATORY RESULT (OUTPATIENT)
Age: 67
End: 2022-08-29

## 2022-08-29 ENCOUNTER — MED ADMIN CHARGE (OUTPATIENT)
Age: 67
End: 2022-08-29

## 2022-08-29 ENCOUNTER — APPOINTMENT (OUTPATIENT)
Dept: RHEUMATOLOGY | Facility: CLINIC | Age: 67
End: 2022-08-29

## 2022-08-29 VITALS
RESPIRATION RATE: 16 BRPM | SYSTOLIC BLOOD PRESSURE: 106 MMHG | OXYGEN SATURATION: 97 % | TEMPERATURE: 97.7 F | DIASTOLIC BLOOD PRESSURE: 67 MMHG | HEART RATE: 55 BPM

## 2022-08-29 VITALS
RESPIRATION RATE: 16 BRPM | HEART RATE: 60 BPM | SYSTOLIC BLOOD PRESSURE: 107 MMHG | TEMPERATURE: 97.5 F | OXYGEN SATURATION: 95 % | DIASTOLIC BLOOD PRESSURE: 71 MMHG

## 2022-08-29 VITALS
DIASTOLIC BLOOD PRESSURE: 59 MMHG | OXYGEN SATURATION: 95 % | RESPIRATION RATE: 16 BRPM | SYSTOLIC BLOOD PRESSURE: 97 MMHG | HEART RATE: 61 BPM | TEMPERATURE: 97.7 F

## 2022-08-29 PROCEDURE — 96413 CHEMO IV INFUSION 1 HR: CPT

## 2022-08-29 PROCEDURE — 96374 THER/PROPH/DIAG INJ IV PUSH: CPT | Mod: 59

## 2022-08-29 PROCEDURE — 36415 COLL VENOUS BLD VENIPUNCTURE: CPT

## 2022-08-29 PROCEDURE — 96415 CHEMO IV INFUSION ADDL HR: CPT

## 2022-08-29 RX ORDER — ACETAMINOPHEN 500 MG/1
500 TABLET ORAL
Qty: 0 | Refills: 0 | Status: COMPLETED | OUTPATIENT
Start: 2022-08-23

## 2022-08-29 RX ORDER — CETIRIZINE HYDROCHLORIDE 10 MG/1
10 TABLET, COATED ORAL
Qty: 0 | Refills: 0 | Status: COMPLETED | OUTPATIENT
Start: 2022-08-23

## 2022-08-29 RX ORDER — RITUXIMAB-ABBS 10 MG/ML
500 INJECTION, SOLUTION INTRAVENOUS
Qty: 0 | Refills: 0 | Status: COMPLETED | OUTPATIENT
Start: 2022-08-23

## 2022-08-29 RX ORDER — METHYLPREDNISOLONE 125 MG/2ML
125 INJECTION, POWDER, LYOPHILIZED, FOR SOLUTION INTRAMUSCULAR; INTRAVENOUS
Qty: 0 | Refills: 0 | Status: COMPLETED | OUTPATIENT
Start: 2022-08-23

## 2022-09-12 ENCOUNTER — APPOINTMENT (OUTPATIENT)
Dept: RHEUMATOLOGY | Facility: CLINIC | Age: 67
End: 2022-09-12
Payer: MEDICARE

## 2022-09-12 ENCOUNTER — MED ADMIN CHARGE (OUTPATIENT)
Age: 67
End: 2022-09-12

## 2022-09-12 ENCOUNTER — NON-APPOINTMENT (OUTPATIENT)
Age: 67
End: 2022-09-12

## 2022-09-12 ENCOUNTER — LABORATORY RESULT (OUTPATIENT)
Age: 67
End: 2022-09-12

## 2022-09-12 VITALS
HEART RATE: 56 BPM | OXYGEN SATURATION: 96 % | RESPIRATION RATE: 16 BRPM | DIASTOLIC BLOOD PRESSURE: 63 MMHG | SYSTOLIC BLOOD PRESSURE: 98 MMHG

## 2022-09-12 VITALS
RESPIRATION RATE: 16 BRPM | DIASTOLIC BLOOD PRESSURE: 63 MMHG | OXYGEN SATURATION: 98 % | HEART RATE: 60 BPM | SYSTOLIC BLOOD PRESSURE: 99 MMHG | TEMPERATURE: 97.6 F

## 2022-09-12 VITALS
HEART RATE: 65 BPM | RESPIRATION RATE: 16 BRPM | SYSTOLIC BLOOD PRESSURE: 103 MMHG | TEMPERATURE: 98 F | OXYGEN SATURATION: 96 % | DIASTOLIC BLOOD PRESSURE: 67 MMHG

## 2022-09-12 PROCEDURE — 36415 COLL VENOUS BLD VENIPUNCTURE: CPT

## 2022-09-12 PROCEDURE — 96415 CHEMO IV INFUSION ADDL HR: CPT

## 2022-09-12 PROCEDURE — 96374 THER/PROPH/DIAG INJ IV PUSH: CPT | Mod: 59

## 2022-09-12 PROCEDURE — 96413 CHEMO IV INFUSION 1 HR: CPT

## 2022-09-12 RX ORDER — ACETAMINOPHEN 325 MG/1
325 TABLET ORAL
Qty: 0 | Refills: 0 | Status: COMPLETED | OUTPATIENT
Start: 2022-09-06

## 2022-09-12 RX ORDER — RITUXIMAB-ABBS 10 MG/ML
500 INJECTION, SOLUTION INTRAVENOUS
Qty: 0 | Refills: 0 | Status: COMPLETED | OUTPATIENT
Start: 2022-09-06

## 2022-09-12 RX ORDER — METHYLPREDNISOLONE 125 MG/2ML
125 INJECTION, POWDER, LYOPHILIZED, FOR SOLUTION INTRAMUSCULAR; INTRAVENOUS
Qty: 0 | Refills: 0 | Status: COMPLETED | OUTPATIENT
Start: 2022-09-06

## 2022-09-12 RX ORDER — CETIRIZINE HYDROCHLORIDE 10 MG/1
10 TABLET, COATED ORAL
Qty: 0 | Refills: 0 | Status: COMPLETED | OUTPATIENT
Start: 2022-09-06

## 2022-09-13 ENCOUNTER — NON-APPOINTMENT (OUTPATIENT)
Age: 67
End: 2022-09-13

## 2022-09-13 ENCOUNTER — APPOINTMENT (OUTPATIENT)
Dept: OPHTHALMOLOGY | Facility: CLINIC | Age: 67
End: 2022-09-13

## 2022-09-13 PROCEDURE — 92083 EXTENDED VISUAL FIELD XM: CPT

## 2022-09-13 PROCEDURE — 99214 OFFICE O/P EST MOD 30 MIN: CPT

## 2022-10-06 ENCOUNTER — APPOINTMENT (OUTPATIENT)
Dept: NEUROLOGY | Facility: CLINIC | Age: 67
End: 2022-10-06

## 2022-10-06 PROCEDURE — 95911 NRV CNDJ TEST 9-10 STUDIES: CPT

## 2022-10-06 PROCEDURE — 95886 MUSC TEST DONE W/N TEST COMP: CPT

## 2022-10-12 ENCOUNTER — APPOINTMENT (OUTPATIENT)
Dept: CARDIOLOGY | Facility: CLINIC | Age: 67
End: 2022-10-12

## 2022-10-12 VITALS
BODY MASS INDEX: 23.69 KG/M2 | OXYGEN SATURATION: 98 % | WEIGHT: 138 LBS | SYSTOLIC BLOOD PRESSURE: 107 MMHG | HEART RATE: 64 BPM | DIASTOLIC BLOOD PRESSURE: 65 MMHG | TEMPERATURE: 97.7 F

## 2022-10-12 PROCEDURE — 99214 OFFICE O/P EST MOD 30 MIN: CPT

## 2022-10-12 NOTE — PHYSICAL EXAM
[General Appearance - Well Developed] : well developed [Normal Appearance] : normal appearance [General Appearance - Well Nourished] : well nourished [Normal Oral Mucosa] : normal oral mucosa [Normal Oropharynx] : normal oropharynx [Respiration, Rhythm And Depth] : normal respiratory rhythm and effort [Auscultation Breath Sounds / Voice Sounds] : lungs were clear to auscultation bilaterally [Heart Rate And Rhythm] : heart rate and rhythm were normal [Heart Sounds] : normal S1 and S2 [Murmurs] : no murmurs present [Arterial Pulses Normal] : the arterial pulses were normal [Edema] : no peripheral edema present [Bowel Sounds] : normal bowel sounds [FreeTextEntry1] : Joint deformities noted in bilateral hands [Nail Clubbing] : no clubbing of the fingernails [Cyanosis, Localized] : no localized cyanosis [Petechial Hemorrhages (___cm)] : no petechial hemorrhages [] : no ischemic changes [Skin Color & Pigmentation] : normal skin color and pigmentation [No Venous Stasis] : no venous stasis [Affect] : the affect was normal

## 2022-10-12 NOTE — DISCUSSION/SUMMARY
[FreeTextEntry1] : Assessment:\par 1.  Coronary Atherosclerosis\par 2.  Chest pressure - some improvement with coreg\par 3.  + Calcium score\par 4.  Family history of CAD\par 5.  RA\par 6.  Anxiety.\par \par \par  Plan:\par 1.  Continue medical therapy for now with:\par - antiplatelet - aspirin \par - statin - keep on 80mg daily \par - beta blocker - coreg \par 2. LDL is well controlled\par 3.  Atypical chest pains, worse with movement of the arms, point tenderness - doubt cardiac cause\par 4.  Followup with Dr. Pacheco for msk pains\par 5.  RTC in 1 year

## 2022-10-12 NOTE — HISTORY OF PRESENT ILLNESS
[FreeTextEntry1] : 10/12/2022\par \par She has point tenderness to palpation of the ant chest wall and left post chest wall\par no sob\par worse with movement of her arms\par BP and HR well controlled\par no exretional cp\par Echo was NORMAL in June\par \par \par \par 4/6/2022\par She complains of chest pressure\par Back pains\par Tired\par  If she moves her arms and shoulders she has pain in the chest and the arms\par Hard to sleep on the left side, She has a tightness in the area\par Fatigue with exertion\par Yesterday she cleaned her bathroom and had pain in the back\par No leg swelling. \par Exertion does not cause chest pain.\par She does get tired with exertion.\par \par She needs to have a humira infusion\par Seen by Dr. Pacheco recently \par Xeljanz is off\par \par \par Medications:\par Famotidine\par Prednisone 2.5 mg daily \par Meclizine \par Coreg 3.125mg BID\par atorvastatin 80\par Folic acid\par Aspirin 81mg daily \par \par \par 10/20/2021\par Doing ok, no exertional chest pain or sob\par Muscles are aching her in the chest and the arm\par no leg swelling\par She is on aspirin and statin therapy\par BP is well controlled\par \par \par CT heart coronary 9/1/2021\par Mid LAD 50%\par Cx 30%\par \par Echo:  EF 63%, mild MR, normal LV size and function, borderline pHTN\par \par Medications:\par Xeljanz\par Famotidine\par Prednisone\par Meclizine \par Coreg 3.125mg BID\par atorvastatin 40\par Folic acid\par Aspirin 81mg daily

## 2022-10-24 ENCOUNTER — NON-APPOINTMENT (OUTPATIENT)
Age: 67
End: 2022-10-24

## 2022-10-24 ENCOUNTER — APPOINTMENT (OUTPATIENT)
Dept: OPHTHALMOLOGY | Facility: CLINIC | Age: 67
End: 2022-10-24

## 2022-10-24 PROCEDURE — 92012 INTRM OPH EXAM EST PATIENT: CPT

## 2022-10-31 ENCOUNTER — APPOINTMENT (OUTPATIENT)
Dept: RADIOLOGY | Facility: CLINIC | Age: 67
End: 2022-10-31

## 2022-10-31 ENCOUNTER — RESULT REVIEW (OUTPATIENT)
Age: 67
End: 2022-10-31

## 2022-10-31 ENCOUNTER — APPOINTMENT (OUTPATIENT)
Dept: RHEUMATOLOGY | Facility: CLINIC | Age: 67
End: 2022-10-31
Payer: MEDICARE

## 2022-10-31 ENCOUNTER — OUTPATIENT (OUTPATIENT)
Dept: OUTPATIENT SERVICES | Facility: HOSPITAL | Age: 67
LOS: 1 days | End: 2022-10-31
Payer: MEDICARE

## 2022-10-31 VITALS
BODY MASS INDEX: 23.56 KG/M2 | HEIGHT: 64 IN | OXYGEN SATURATION: 98 % | TEMPERATURE: 97.6 F | HEART RATE: 64 BPM | RESPIRATION RATE: 16 BRPM | WEIGHT: 138 LBS | DIASTOLIC BLOOD PRESSURE: 76 MMHG | SYSTOLIC BLOOD PRESSURE: 127 MMHG

## 2022-10-31 DIAGNOSIS — M06.9 RHEUMATOID ARTHRITIS, UNSPECIFIED: ICD-10-CM

## 2022-10-31 DIAGNOSIS — M25.561 PAIN IN RIGHT KNEE: ICD-10-CM

## 2022-10-31 DIAGNOSIS — N95.2 POSTMENOPAUSAL ATROPHIC VAGINITIS: ICD-10-CM

## 2022-10-31 DIAGNOSIS — M25.562 PAIN IN RIGHT KNEE: ICD-10-CM

## 2022-10-31 PROCEDURE — 73130 X-RAY EXAM OF HAND: CPT | Mod: 26,LT,RT

## 2022-10-31 PROCEDURE — 73130 X-RAY EXAM OF HAND: CPT

## 2022-10-31 PROCEDURE — 73610 X-RAY EXAM OF ANKLE: CPT | Mod: 26,LT,RT

## 2022-10-31 PROCEDURE — 73110 X-RAY EXAM OF WRIST: CPT | Mod: 26,LT,RT

## 2022-10-31 PROCEDURE — 73562 X-RAY EXAM OF KNEE 3: CPT | Mod: 26,LT,RT

## 2022-10-31 PROCEDURE — 73630 X-RAY EXAM OF FOOT: CPT | Mod: 26,LT,RT

## 2022-10-31 PROCEDURE — 73110 X-RAY EXAM OF WRIST: CPT

## 2022-10-31 PROCEDURE — 99215 OFFICE O/P EST HI 40 MIN: CPT | Mod: 25

## 2022-10-31 PROCEDURE — 73610 X-RAY EXAM OF ANKLE: CPT

## 2022-10-31 PROCEDURE — 73562 X-RAY EXAM OF KNEE 3: CPT

## 2022-10-31 PROCEDURE — 73630 X-RAY EXAM OF FOOT: CPT

## 2022-10-31 RX ORDER — RITUXIMAB-ABBS 10 MG/ML
500 INJECTION, SOLUTION INTRAVENOUS
Qty: 4 | Refills: 0 | Status: COMPLETED | COMMUNITY
Start: 2022-07-29 | End: 2022-09-12

## 2022-10-31 RX ORDER — ABATACEPT 125 MG/ML
125 INJECTION, SOLUTION SUBCUTANEOUS
Qty: 3 | Refills: 3 | Status: DISCONTINUED | COMMUNITY
Start: 2022-04-12 | End: 2022-10-31

## 2022-10-31 RX ORDER — RITUXIMAB 10 MG/ML
500 INJECTION, SOLUTION INTRAVENOUS
Qty: 4 | Refills: 0 | Status: DISCONTINUED | COMMUNITY
Start: 2022-07-26 | End: 2022-10-31

## 2022-11-01 ENCOUNTER — APPOINTMENT (OUTPATIENT)
Dept: INTERNAL MEDICINE | Facility: CLINIC | Age: 67
End: 2022-11-01

## 2022-11-02 LAB
25(OH)D3 SERPL-MCNC: 38.8 NG/ML
ALBUMIN SERPL ELPH-MCNC: 4.3 G/DL
ALP BLD-CCNC: 80 U/L
ALT SERPL-CCNC: 19 U/L
ANION GAP SERPL CALC-SCNC: 12 MMOL/L
AST SERPL-CCNC: 19 U/L
BASOPHILS # BLD AUTO: 0.02 K/UL
BASOPHILS NFR BLD AUTO: 0.3 %
BILIRUB SERPL-MCNC: 0.3 MG/DL
BUN SERPL-MCNC: 16 MG/DL
CALCIUM SERPL-MCNC: 9.3 MG/DL
CHLORIDE SERPL-SCNC: 102 MMOL/L
CO2 SERPL-SCNC: 24 MMOL/L
CREAT SERPL-MCNC: 0.78 MG/DL
CRP SERPL-MCNC: 8 MG/L
EGFR: 83 ML/MIN/1.73M2
EOSINOPHIL # BLD AUTO: 0.3 K/UL
EOSINOPHIL NFR BLD AUTO: 5.2 %
ERYTHROCYTE [SEDIMENTATION RATE] IN BLOOD BY WESTERGREN METHOD: 67 MM/HR
GLUCOSE SERPL-MCNC: 121 MG/DL
HAV IGM SER QL: NONREACTIVE
HBV CORE IGG+IGM SER QL: NONREACTIVE
HBV CORE IGM SER QL: NONREACTIVE
HBV SURFACE AG SER QL: NONREACTIVE
HCT VFR BLD CALC: 41 %
HCV AB SER QL: NONREACTIVE
HCV S/CO RATIO: 0.18 S/CO
HGB BLD-MCNC: 13.4 G/DL
IGA SER QL IEP: 529 MG/DL
IGG SER QL IEP: 1476 MG/DL
IGM SER QL IEP: 48 MG/DL
IMM GRANULOCYTES NFR BLD AUTO: 0.3 %
LYMPHOCYTES # BLD AUTO: 1.22 K/UL
LYMPHOCYTES NFR BLD AUTO: 21 %
M TB IFN-G BLD-IMP: NEGATIVE
MAN DIFF?: NORMAL
MCHC RBC-ENTMCNC: 29.1 PG
MCHC RBC-ENTMCNC: 32.7 GM/DL
MCV RBC AUTO: 88.9 FL
MONOCYTES # BLD AUTO: 0.66 K/UL
MONOCYTES NFR BLD AUTO: 11.4 %
NEUTROPHILS # BLD AUTO: 3.58 K/UL
NEUTROPHILS NFR BLD AUTO: 61.8 %
PLATELET # BLD AUTO: 231 K/UL
POTASSIUM SERPL-SCNC: 4.3 MMOL/L
PROT SERPL-MCNC: 7.3 G/DL
QUANTIFERON TB PLUS MITOGEN MINUS NIL: 2.73 IU/ML
QUANTIFERON TB PLUS NIL: 0.03 IU/ML
QUANTIFERON TB PLUS TB1 MINUS NIL: -0.01 IU/ML
QUANTIFERON TB PLUS TB2 MINUS NIL: -0.01 IU/ML
RBC # BLD: 4.61 M/UL
RBC # FLD: 13.9 %
SODIUM SERPL-SCNC: 139 MMOL/L
WBC # FLD AUTO: 5.8 K/UL

## 2022-11-04 LAB
CD16+CD56+ CELLS # BLD: NORMAL /UL
CD16+CD56+ CELLS NFR BLD: NORMAL %
CD19 CELLS NFR BLD: NORMAL /UL
CD3 CELLS # BLD: 1229 /UL
CD3 CELLS NFR BLD: 82 %
CD3+CD4+ CELLS # BLD: 729 /UL
CD3+CD4+ CELLS NFR BLD: 49 %
CD3+CD4+ CELLS/CD3+CD8+ CLL SPEC: 1.4 RATIO
CD3+CD8+ CELLS # SPEC: 521 /UL
CD3+CD8+ CELLS NFR BLD: 35 %
CELLS.CD3-CD19+/CELLS IN BLOOD: NORMAL %

## 2022-11-07 ENCOUNTER — NON-APPOINTMENT (OUTPATIENT)
Age: 67
End: 2022-11-07

## 2022-11-21 ENCOUNTER — OUTPATIENT (OUTPATIENT)
Dept: OUTPATIENT SERVICES | Facility: HOSPITAL | Age: 67
LOS: 1 days | End: 2022-11-21
Payer: MEDICARE

## 2022-11-21 ENCOUNTER — APPOINTMENT (OUTPATIENT)
Dept: RADIOLOGY | Facility: IMAGING CENTER | Age: 67
End: 2022-11-21

## 2022-11-21 DIAGNOSIS — Z00.8 ENCOUNTER FOR OTHER GENERAL EXAMINATION: ICD-10-CM

## 2022-11-21 PROCEDURE — 77080 DXA BONE DENSITY AXIAL: CPT | Mod: 26

## 2022-11-21 PROCEDURE — 77080 DXA BONE DENSITY AXIAL: CPT

## 2022-11-23 ENCOUNTER — NON-APPOINTMENT (OUTPATIENT)
Age: 67
End: 2022-11-23

## 2022-12-20 DIAGNOSIS — H81.20 VESTIBULAR NEURONITIS, UNSPECIFIED EAR: ICD-10-CM

## 2022-12-30 ENCOUNTER — RX RENEWAL (OUTPATIENT)
Age: 67
End: 2022-12-30

## 2023-01-11 ENCOUNTER — APPOINTMENT (OUTPATIENT)
Dept: INTERNAL MEDICINE | Facility: CLINIC | Age: 68
End: 2023-01-11
Payer: MEDICARE

## 2023-01-11 VITALS
WEIGHT: 141 LBS | DIASTOLIC BLOOD PRESSURE: 73 MMHG | HEIGHT: 64 IN | OXYGEN SATURATION: 97 % | TEMPERATURE: 97.3 F | HEART RATE: 78 BPM | BODY MASS INDEX: 24.07 KG/M2 | SYSTOLIC BLOOD PRESSURE: 121 MMHG

## 2023-01-11 PROCEDURE — 99215 OFFICE O/P EST HI 40 MIN: CPT

## 2023-01-11 RX ORDER — AMOXICILLIN AND CLAVULANATE POTASSIUM 875; 125 MG/1; MG/1
875-125 TABLET, COATED ORAL
Qty: 14 | Refills: 0 | Status: COMPLETED | COMMUNITY
Start: 2023-01-11 | End: 2023-01-18

## 2023-01-11 RX ORDER — SACCHAROMYCES BOULARDII 50 MG
250 CAPSULE ORAL
Qty: 28 | Refills: 0 | Status: COMPLETED | COMMUNITY
Start: 2023-01-11 | End: 2023-01-25

## 2023-01-11 RX ORDER — FLUOCINOLONE ACETONIDE 0.25 MG/G
0.03 OINTMENT TOPICAL
Qty: 1 | Refills: 2 | Status: ACTIVE | COMMUNITY
Start: 2023-01-11 | End: 1900-01-01

## 2023-01-11 RX ORDER — FLUTICASONE PROPIONATE 50 UG/1
50 SPRAY, METERED NASAL
Qty: 1 | Refills: 0 | Status: ACTIVE | COMMUNITY
Start: 2023-01-11 | End: 1900-01-01

## 2023-01-11 NOTE — PHYSICAL EXAM
[No Acute Distress] : no acute distress [Well Nourished] : well nourished [Well Developed] : well developed [Well-Appearing] : well-appearing [Normal Sclera/Conjunctiva] : normal sclera/conjunctiva [PERRL] : pupils equal round and reactive to light [EOMI] : extraocular movements intact [Normal Outer Ear/Nose] : the outer ears and nose were normal in appearance [Normal Oropharynx] : the oropharynx was normal [No JVD] : no jugular venous distention [No Lymphadenopathy] : no lymphadenopathy [Supple] : supple [Thyroid Normal, No Nodules] : the thyroid was normal and there were no nodules present [No Respiratory Distress] : no respiratory distress  [No Accessory Muscle Use] : no accessory muscle use [Clear to Auscultation] : lungs were clear to auscultation bilaterally [Normal Rate] : normal rate  [Regular Rhythm] : with a regular rhythm [Normal S1, S2] : normal S1 and S2 [No Murmur] : no murmur heard [No Carotid Bruits] : no carotid bruits [No Abdominal Bruit] : a ~M bruit was not heard ~T in the abdomen [No Varicosities] : no varicosities [Pedal Pulses Present] : the pedal pulses are present [No Edema] : there was no peripheral edema [No Palpable Aorta] : no palpable aorta [No Extremity Clubbing/Cyanosis] : no extremity clubbing/cyanosis [Soft] : abdomen soft [Non Tender] : non-tender [Non-distended] : non-distended [No Masses] : no abdominal mass palpated [No HSM] : no HSM [Normal Bowel Sounds] : normal bowel sounds [Normal Posterior Cervical Nodes] : no posterior cervical lymphadenopathy [Normal Anterior Cervical Nodes] : no anterior cervical lymphadenopathy [No CVA Tenderness] : no CVA  tenderness [No Spinal Tenderness] : no spinal tenderness [No Joint Swelling] : no joint swelling [Grossly Normal Strength/Tone] : grossly normal strength/tone [No Rash] : no rash [Coordination Grossly Intact] : coordination grossly intact [No Focal Deficits] : no focal deficits [Normal Gait] : normal gait [Deep Tendon Reflexes (DTR)] : deep tendon reflexes were 2+ and symmetric [Normal Affect] : the affect was normal [Normal Insight/Judgement] : insight and judgment were intact [de-identified] : RLQ minimal pain [de-identified] : R hip int ext rotation FROM no pain [de-identified] : toe and heel walk normal

## 2023-01-11 NOTE — HEALTH RISK ASSESSMENT
[0] : 2) Feeling down, depressed, or hopeless: Not at all (0) [PHQ-2 Negative - No further assessment needed] : PHQ-2 Negative - No further assessment needed [[id number]] : 0

## 2023-01-11 NOTE — HISTORY OF PRESENT ILLNESS
[de-identified] : 68 yo OA, RA, osteoporosis, ILD  crohn's ileiitis, CAD non-occlusive\par \par In Oct 2022 had miliary pruritis in arms and gone after 2 weeks\par Noted same miliary pruritis in BL legs L>R\par \par EM Oct 2022 NORMAL EMG\par Reports BL foot needles AUG 2022 and 2 MOS later OCT had normal EMG\par \par Believes je was told not to take COVID nor flu vaccine (not live and would be less effective on POrencia)\par \par Foot\par \par

## 2023-01-11 NOTE — ASSESSMENT
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 The lesion tend to last 1-3 weeks and spontaneously resolves. This is treated either with topical steroids, dapsone, colchicine, hydroxychlor or anti malarials

## 2023-01-12 LAB
ALBUMIN SERPL ELPH-MCNC: 4.5 G/DL
ALP BLD-CCNC: 71 U/L
ALT SERPL-CCNC: 16 U/L
ANION GAP SERPL CALC-SCNC: 9 MMOL/L
AST SERPL-CCNC: 18 U/L
BASOPHILS # BLD AUTO: 0.05 K/UL
BASOPHILS NFR BLD AUTO: 0.6 %
BILIRUB SERPL-MCNC: 0.2 MG/DL
BUN SERPL-MCNC: 18 MG/DL
CALCIUM SERPL-MCNC: 9.9 MG/DL
CHLORIDE SERPL-SCNC: 97 MMOL/L
CHOLEST SERPL-MCNC: 188 MG/DL
CO2 SERPL-SCNC: 31 MMOL/L
CREAT SERPL-MCNC: 0.81 MG/DL
EGFR: 80 ML/MIN/1.73M2
EOSINOPHIL # BLD AUTO: 0.39 K/UL
EOSINOPHIL NFR BLD AUTO: 4.6 %
ESTIMATED AVERAGE GLUCOSE: 120 MG/DL
GLUCOSE SERPL-MCNC: 164 MG/DL
HBA1C MFR BLD HPLC: 5.8 %
HCT VFR BLD CALC: 41.8 %
HDLC SERPL-MCNC: 84 MG/DL
HGB BLD-MCNC: 13.6 G/DL
IMM GRANULOCYTES NFR BLD AUTO: 0.2 %
LDLC SERPL CALC-MCNC: 71 MG/DL
LYMPHOCYTES # BLD AUTO: 2 K/UL
LYMPHOCYTES NFR BLD AUTO: 23.4 %
MAN DIFF?: NORMAL
MCHC RBC-ENTMCNC: 28.6 PG
MCHC RBC-ENTMCNC: 32.5 GM/DL
MCV RBC AUTO: 88 FL
MONOCYTES # BLD AUTO: 0.54 K/UL
MONOCYTES NFR BLD AUTO: 6.3 %
NEUTROPHILS # BLD AUTO: 5.54 K/UL
NEUTROPHILS NFR BLD AUTO: 64.9 %
NONHDLC SERPL-MCNC: 104 MG/DL
PLATELET # BLD AUTO: 266 K/UL
POTASSIUM SERPL-SCNC: 4.4 MMOL/L
PROT SERPL-MCNC: 7.6 G/DL
RBC # BLD: 4.75 M/UL
RBC # FLD: 13.1 %
SODIUM SERPL-SCNC: 137 MMOL/L
TRIGL SERPL-MCNC: 168 MG/DL
WBC # FLD AUTO: 8.54 K/UL

## 2023-01-13 ENCOUNTER — APPOINTMENT (OUTPATIENT)
Dept: RHEUMATOLOGY | Facility: CLINIC | Age: 68
End: 2023-01-13
Payer: MEDICARE

## 2023-01-13 VITALS
HEART RATE: 72 BPM | WEIGHT: 142 LBS | HEIGHT: 64 IN | OXYGEN SATURATION: 96 % | SYSTOLIC BLOOD PRESSURE: 130 MMHG | TEMPERATURE: 97.3 F | BODY MASS INDEX: 24.24 KG/M2 | DIASTOLIC BLOOD PRESSURE: 75 MMHG

## 2023-01-13 PROCEDURE — 99215 OFFICE O/P EST HI 40 MIN: CPT | Mod: 25

## 2023-01-13 NOTE — REVIEW OF SYSTEMS
[Feeling Poorly] : feeling poorly [Feeling Tired] : feeling tired [As Noted in HPI] : as noted in HPI [Negative] : Heme/Lymph [Abdominal Pain] : no abdominal pain [FreeTextEntry7] : had CT scan of abdomen, which was unrevealing

## 2023-01-13 NOTE — PHYSICAL EXAM
[General Appearance - Alert] : alert [General Appearance - In No Acute Distress] : in no acute distress [General Appearance - Well Nourished] : well nourished [General Appearance - Well Developed] : well developed [General Appearance - Well-Appearing] : healthy appearing [Sclera] : the sclera and conjunctiva were normal [PERRL With Normal Accommodation] : pupils were equal in size, round, and reactive to light [Extraocular Movements] : extraocular movements were intact [Skin Color & Pigmentation] : normal skin color and pigmentation [Skin Turgor] : normal skin turgor [] : no rash [Sensation] : the sensory exam was normal to light touch and pinprick [Motor Exam] : the motor exam was normal [No Focal Deficits] : no focal deficits [Oriented To Time, Place, And Person] : oriented to person, place, and time [Impaired Insight] : insight and judgment were intact [Affect] : the affect was normal [FreeTextEntry1] :  chronic skin changes. freckles on cest and bruises on leg

## 2023-01-13 NOTE — HISTORY OF PRESENT ILLNESS
[CCP] : Cyclic citrullinated peptide (CCP) antibody [Erosions] : erosions [Interstitial Lung Disease] : interstitial lung disease [Joint Pain] : joint pain [FreeTextEntry1] : JESSA PARK is a 67 year  old female, seen on today  for\par \par RA\par rituxan on 8-29-22 and 9-12-22\par joint pain is much better since rituxan inclding knee \par getting small spots on her legs and this are occuring after her infusion in September \par spots aren't painful \par spots started 2 weeks \par planning to see dermatology \par \par OA - at baseline \par \par OP \par dexa in  with openia and low frax\par \par neck pain  - pending MRI \par  - hasn't had mri yet \par \par getting small spots on her legs and this are occuring after her infusion in September \par spots aren't painful \par spots started 2 weeks \par \par \par rituxan on 8-29-22 and 9-12-22\par pins and needles in her whole body - has resolved  \par knees are painful only \par ptga - 6 and attributes it to her knees.  [Joint Swelling] : no joint swelling [Rash] : no rash [Shortness of Breath] : no shortness of breath [Ocular Symptoms] : no ocular symptoms [Dysphonia] : no dysphonia [Morning Stiffness] : no morning stiffness [Chest Pain] : no chest pain [Fatigue] : no fatigue [TextBox_2] : 1990's [TextBox_38] : MTX [TextBox_42] : humira, enbrel, remicade (2013-208), simponi (2018 x 2 doses), Xeljanz (6-2018-> 4/2022);  Orencia (4-2022 -> 8-2022) [TextBox_44] : Rituxan (8/29 and 9/12) [TextBox_70] : feels much better since the rituxan \par she has pain on only in the knees - worst at night and starts in the afternoon. \par she doesn't have swelling in the knee \par occasionally with chest pressure - has seen cardiology and found to be msk related and not cardiology related. \par

## 2023-01-13 NOTE — ASSESSMENT
[FreeTextEntry1] : JESSA PARK is a 67 year  old female, seen on today  for\par \par #  RA:  Diagnosed in the early 1990's.  significant disease damage burden, pulm fibrosis \par + nodules (chronic) \par + deformities\par Goal of treatment is RA low disease activity\par significantly  better with Rituxan  and will re-dose in marchapril 2021\par stop steroid  \par Risks of, benefits of and alternatives to this treatment plan discussed with patient and patient expressed understanding. \par Current RA medications require blood work Q 3 months to assess for toxicity (bone marrow toxicity, liver toxicity, kidney toxicity) \par Will check laboratory tests to look for markers of disease activity and also to assess for medication toxicity.  \par Risks of, benefits of and alternatives to this treatment plan discussed with patient and patient expressed understanding.\par Xrays due \par Qgold and Hep screening due   \par DEXA due  \par repeat rituxan in 4-2023\par \par # ILD  - note from pulm from  reviewed and PFT WNL \par [] continue current  medications for RA and monitoring for progression of ILD with pulmonary \par \par # secondary fibromyalgia \par recommend gabapentin but she doesn't want to start any new medications today \par \par #OA \par [] tylenol prn \par \par #HCM -\par Shingrx 1 in dec 2020 and # 2 in april 2021\par [] d/w patient mason on 10-31-22 and she agrees\par \par \par Neuropathy\par [] multiple causes considered including compressive due to inflammatory disease but can also consider diabetes related or other metabolic cause \par [] follow up neurology \par [] MRI cervical spine (information given to patient) - requests open and requests xanax (has at home)\par \par # Osteoporosis seen on DEXA  and osteopenia on dexa in \par [] check vitamin D today \par [] Had reclast 3-2021 and 4/25/22\par []  will order next reclast for 4-2023 \par \par More than 50% of the encounter was spent counselling  JESSA PARK on differential, workup, disease course, and treatment/management.   Education was provided to JESSA PARK during this encounter. All questions and concerns were answered and addressed in detail.  JESSA PARK verbalized understanding and agreed to plan\par \par JESSA PARK has been instructed to call for an earlier appointment if new symptoms develop \par JESSA PARK has been instructed to make a follow up appointment 12 weeks \par \par \par \par  \par \par

## 2023-01-13 NOTE — DATA REVIEWED
[FreeTextEntry1] : Laboratory and radiology studies that were personally reviewed at today's visit are summarized below and above:\par dexa (11-21-22):  Openia (frax 6.8 and 0.9)\par \par neurology note -6-2022 - concern for entrapment - needs ncv\par cardiology note reviewed from 10-20-21 and significant CAD \par Orthopedics notes from 5-2021 reviewed - s/p fracture with good healing \par CT chest :  Pulm fibrosis with no progression since imaging 4-2019\par Cspine XR :  no c1/c2 disease \par DEXA : osteoporosis (spine=-2.5, FN=-2.7, Th = -1.6)\par \par Pulm appt note from 9-2019 reviewed and PFT 9-4-2019 same as 3-2019.  \par CT CHEST (4-4-2019) Increasing peripheral parenchymal/subpleural fibrosis and honeycombing of lung. \par Increased traction bronchiectasis. \par No consolidating infiltrate nor developing parenchymal mass. \par No evidence of bowel related inflammation nor obstruction.\par \par CXR (6-2018) WNL\par \par DEXA (8-28-18)  FRAX 5.8% and 0.6%\par \par \par MRI cspine (2018)  Mild multilevel spondylosis of the cervical spine, as described above. The overall appearance of the cervical  spine is similar to MRI of 2013.\par \par EXAM:  CT HEART CALCIUM SCORE                         PROCEDURE DATE:  11/27/2018       INTERPRETATION:  Indication:  Coronary artery disease  History:  Ms. Pavon is a 63-year-old woman with rheumatoid arthritis and  dyslipidemia who reports chest pain.  Acquisitions: Non-contrast prospectively-gated 320-multidtector volumetric computed  tomography heart  Pre-medications: Metoprolol 10 mg intravenous  Quality:  Good  Post-processing:  Images analyzed with Vitrea software.   FINDINGS:  Cardiovascular:  Coronary arteries:  Coronary artery calcium Agatston score:   Left main (LM) coronary artery:  0 Left anterior descending (LAD) coronary artery:  371 Left circumflex (LCX) coronary artery:  47 Right coronary artery (RCA):  0 Total:  418  Aorta: Minimal calcification of the aortic root noted.    Pericardium: There is no pericardial effusion.    Chambers: The cardiac chambers are qualitatively normal in size.  Non-cardiac: Bilateral reticular opacities are noted with a slightly  upper lobe predominance. There is peripheral honeycombing as well as mild  traction bronchiectasis. No significant consolidative or nodular  component is noted. In a patient of this age group, this is likely  related to collagen vascular disease. Unremarkable   IMPRESSION:   1.  Severe coronary artery calcium (Agatston score 418) as delineated  above.  The observed calcium score is at percentile 98 for individuals of  the same age, gender, and race/ethnicity who are free of clinical  cardiovascular disease and treated diabetes.

## 2023-01-13 NOTE — REASON FOR VISIT
[Follow-Up: _____] : a [unfilled] follow-up visit [[employer] ] : provided by [employer]   [Time Spent: ____ minutes] : Total time spent using  services: [unfilled] minutes. The patient's primary language is not English thus required  services. [FreeTextEntry1] : RA, high risk medication use, OA, OP, ILD [Interpreters_IDNumber] : [id number] [Interpreters_FullName] : Delores

## 2023-01-15 LAB
CD16+CD56+ CELLS # BLD: 197 CELLS/UL
CD16+CD56+ CELLS NFR BLD: 14 %
CD19 CELLS NFR BLD: 1 CELLS/UL
CD3 CELLS # BLD: 1195 CELLS/UL
CD3 CELLS NFR BLD: 85 %
CD3+CD4+ CELLS # BLD: 762 CELLS/UL
CD3+CD4+ CELLS NFR BLD: 53 %
CD3+CD4+ CELLS/CD3+CD8+ CLL SPEC: 1.57 RATIO
CD3+CD8+ CELLS # SPEC: 484 CELLS/UL
CD3+CD8+ CELLS NFR BLD: 33 %
CELLS.CD3-CD19+/CELLS IN BLOOD: <1 %
CRP SERPL-MCNC: <3 MG/L
ERYTHROCYTE [SEDIMENTATION RATE] IN BLOOD BY WESTERGREN METHOD: 35 MM/HR

## 2023-01-30 ENCOUNTER — APPOINTMENT (OUTPATIENT)
Dept: OPHTHALMOLOGY | Facility: CLINIC | Age: 68
End: 2023-01-30
Payer: MEDICARE

## 2023-01-30 ENCOUNTER — NON-APPOINTMENT (OUTPATIENT)
Age: 68
End: 2023-01-30

## 2023-01-30 PROCEDURE — 92012 INTRM OPH EXAM EST PATIENT: CPT

## 2023-02-01 ENCOUNTER — NON-APPOINTMENT (OUTPATIENT)
Age: 68
End: 2023-02-01

## 2023-02-15 ENCOUNTER — RX RENEWAL (OUTPATIENT)
Age: 68
End: 2023-02-15

## 2023-03-06 ENCOUNTER — APPOINTMENT (OUTPATIENT)
Dept: OPHTHALMOLOGY | Facility: CLINIC | Age: 68
End: 2023-03-06

## 2023-03-13 ENCOUNTER — APPOINTMENT (OUTPATIENT)
Dept: RHEUMATOLOGY | Facility: CLINIC | Age: 68
End: 2023-03-13
Payer: MEDICARE

## 2023-03-13 ENCOUNTER — RESULT REVIEW (OUTPATIENT)
Age: 68
End: 2023-03-13

## 2023-03-13 VITALS
OXYGEN SATURATION: 98 % | HEART RATE: 65 BPM | BODY MASS INDEX: 23.39 KG/M2 | DIASTOLIC BLOOD PRESSURE: 77 MMHG | RESPIRATION RATE: 16 BRPM | TEMPERATURE: 97.6 F | WEIGHT: 137 LBS | SYSTOLIC BLOOD PRESSURE: 118 MMHG | HEIGHT: 64 IN

## 2023-03-13 DIAGNOSIS — M25.512 PAIN IN LEFT SHOULDER: ICD-10-CM

## 2023-03-13 PROCEDURE — 99215 OFFICE O/P EST HI 40 MIN: CPT | Mod: 25

## 2023-03-13 RX ORDER — ZOLEDRONIC ACID 5 MG/100ML
5 INJECTION INTRAVENOUS
Qty: 100 | Refills: 0 | Status: DISCONTINUED | COMMUNITY
Start: 2022-03-29 | End: 2023-03-13

## 2023-03-13 RX ORDER — ZOLEDRONIC ACID 5 MG/100ML
5 INJECTION INTRAVENOUS
Qty: 0 | Refills: 0 | Status: DISCONTINUED | OUTPATIENT
Start: 2023-01-25 | End: 2023-03-13

## 2023-03-13 RX ADMIN — ZOLEDRONIC ACID 0 MG/100ML: 5 INJECTION, SOLUTION INTRAVENOUS at 00:00

## 2023-03-13 NOTE — PHYSICAL EXAM
[General Appearance - Alert] : alert [General Appearance - In No Acute Distress] : in no acute distress [General Appearance - Well Nourished] : well nourished [General Appearance - Well Developed] : well developed [General Appearance - Well-Appearing] : healthy appearing [Sclera] : the sclera and conjunctiva were normal [PERRL With Normal Accommodation] : pupils were equal in size, round, and reactive to light [Extraocular Movements] : extraocular movements were intact [Skin Color & Pigmentation] : normal skin color and pigmentation [Skin Turgor] : normal skin turgor [] : no rash [Sensation] : the sensory exam was normal to light touch and pinprick [Motor Exam] : the motor exam was normal [No Focal Deficits] : no focal deficits [Oriented To Time, Place, And Person] : oriented to person, place, and time [Impaired Insight] : insight and judgment were intact [Affect] : the affect was normal [FreeTextEntry1] :  chronic skin changes.

## 2023-03-13 NOTE — REASON FOR VISIT
[Follow-Up: _____] : a [unfilled] follow-up visit [[employer] ] : provided by [employer]   [Time Spent: ____ minutes] : Total time spent using  services: [unfilled] minutes. The patient's primary language is not English thus required  services. [FreeTextEntry1] : RA, high risk medication use, OA, OP, ILD [Interpreters_IDNumber] : [id number] [Interpreters_FullName] : Nory

## 2023-03-13 NOTE — ASSESSMENT
[FreeTextEntry1] : JESSA PARK is a 68 year  old female, seen on today  for\par \par #  RA:  Diagnosed in the early 1990's.  significant disease damage burden, pulm fibrosis \par + nodules (chronic) \par + deformities\par Goal of treatment is RA low disease activity\par significantly  better with Rituxan  and will re-dose in march/april 2023 but need to await results of chest CT as below \par off steroids \par Risks of, benefits of and alternatives to this treatment plan discussed with patient and patient expressed understanding. \par Current RA medications require blood work Q 3 months to assess for toxicity (bone marrow toxicity, liver toxicity, kidney toxicity) \par Will check laboratory tests to look for markers of disease activity and also to assess for medication toxicity.  \par Risks of, benefits of and alternatives to this treatment plan discussed with patient and patient expressed understanding.\par Xrays due \par Qgold and Hep screening due   \par DEXA due  \par repeat rituxan in 4-2023 (will order but not to be done until after chest CT back \par \par # ILD  - note from pulm from  reviewed and PFT WNL \par [] continue current  medications for RA and monitoring for progression of ILD with pulmonary \par \par # new cough and pain in the left chest - some pleuritic component \par [] check CT chest \par [] check xray shoudler\par [] needs cardiology follow up as her current pain is not the same as described in the  visit.  recommend cardiology follow up \par \par #OA \par [] tylenol prn \par \par #HCM -\par Shingrx 1 in dec 2020 and # 2 in april 2021\par \par Neuropathy\par [] multiple causes considered including compressive due to inflammatory disease but can also consider diabetes related or other metabolic cause \par [] follow up neurology \par [] MRI cervical spine (information given to patient) - requests open and requests xanax (has at home) - she jatinder call with name of the location where it was done.  (she recalls this being a brain MRI \par \par # Osteoporosis seen on DEXA  and osteopenia on dexa in \par [] check vitamin D today \par [] Had reclast 3-2021 and 4/25/22\par []  will order next reclast for 4-2023  (ordered today) \par \par More than 50% of the encounter was spent counselling  JESSA PARK on differential, workup, disease course, and treatment/management.   Education was provided to JESSA PARK during this encounter. All questions and concerns were answered and addressed in detail.  JESSA PARK verbalized understanding and agreed to plan\par \par JESSA PARK has been instructed to call for an earlier appointment if new symptoms develop \par JESSA PARK has been instructed to make a follow up appointment 12 weeks \par \par \par \par  >  blood work today \par > check CT chest and left shoulder xrayt \par > redose reclast\par > redose rituxan \par \par

## 2023-03-13 NOTE — HISTORY OF PRESENT ILLNESS
[CCP] : Cyclic citrullinated peptide (CCP) antibody [Erosions] : erosions [Interstitial Lung Disease] : interstitial lung disease [Joint Pain] : joint pain [FreeTextEntry1] : JESSA PARK is a 68 year  old female, seen on today  for\par \par RA\par Rituxan on 8-29-22 and 9-12-22\par joint pain is much better since Rituxan including knee \par \par Has pain in the left shoulder and left anterior chest and left back - she has had this for a few months and worse in the evening but is constant all day \par she can't sleep on the right side or left side due to pain \par This started before the cough \par \par cough for 1 month - has sinus disease and allergies that she attributes as the cause of her cough  - productive of phlegm no blood \par \par OA - at baseline \par \par OP \par dexa in  with openia and low frax but DEXA from  with OSTEOPOROSIS \par \par neck pain  - pending MRI \par  - hasn't had mri yet \par \par \par \par rituxan on 8-29-22 and 9-12-22\par pins and needles in her whole body - has resolved  \par knees are painful only \par ptga - 6 and attributes it to her knees.  [Joint Swelling] : no joint swelling [Rash] : no rash [Shortness of Breath] : no shortness of breath [Ocular Symptoms] : no ocular symptoms [Dysphonia] : no dysphonia [Morning Stiffness] : no morning stiffness [Chest Pain] : no chest pain [Fatigue] : no fatigue [TextBox_2] : 1990's [TextBox_38] : MTX [TextBox_44] : Rituxan (8/29 and 9/12) [TextBox_42] : humira, enbrel, remicade (2013-208), simponi (2018 x 2 doses), Xeljanz (6-2018-> 4/2022);  Orencia (4-2022 -> 8-2022) [TextBox_70] : feels much better since the rituxan \par she has pain on only in the knees - worst at night and starts in the afternoon. \par she doesn't have swelling in the knee \par occasionally with chest pressure - has seen cardiology and found to be msk related and not cardiology related. \par

## 2023-03-14 LAB
ALBUMIN SERPL ELPH-MCNC: 4.2 G/DL
ALP BLD-CCNC: 63 U/L
ALT SERPL-CCNC: 13 U/L
ANION GAP SERPL CALC-SCNC: 10 MMOL/L
APPEARANCE: ABNORMAL
AST SERPL-CCNC: 20 U/L
BACTERIA UR CULT: NORMAL
BACTERIA: ABNORMAL
BASOPHILS # BLD AUTO: 0.04 K/UL
BASOPHILS NFR BLD AUTO: 0.6 %
BILIRUB SERPL-MCNC: 0.3 MG/DL
BILIRUBIN URINE: NEGATIVE
BLOOD URINE: NEGATIVE
BUN SERPL-MCNC: 16 MG/DL
CALCIUM SERPL-MCNC: 9.6 MG/DL
CD16+CD56+ CELLS # BLD: 153 CELLS/UL
CD16+CD56+ CELLS NFR BLD: 11 %
CD19 CELLS NFR BLD: 30 CELLS/UL
CD3 CELLS # BLD: 1167 CELLS/UL
CD3 CELLS NFR BLD: 85 %
CD3+CD4+ CELLS # BLD: 783 CELLS/UL
CD3+CD4+ CELLS NFR BLD: 57 %
CD3+CD4+ CELLS/CD3+CD8+ CLL SPEC: 2.05 RATIO
CD3+CD8+ CELLS # SPEC: 382 CELLS/UL
CD3+CD8+ CELLS NFR BLD: 28 %
CELLS.CD3-CD19+/CELLS IN BLOOD: 2 %
CHLORIDE SERPL-SCNC: 103 MMOL/L
CO2 SERPL-SCNC: 27 MMOL/L
COLOR: NORMAL
CREAT SERPL-MCNC: 0.84 MG/DL
CRP SERPL-MCNC: <3 MG/L
EGFR: 76 ML/MIN/1.73M2
EOSINOPHIL # BLD AUTO: 0.34 K/UL
EOSINOPHIL NFR BLD AUTO: 5 %
ERYTHROCYTE [SEDIMENTATION RATE] IN BLOOD BY WESTERGREN METHOD: 58 MM/HR
GLUCOSE QUALITATIVE U: NEGATIVE
GLUCOSE SERPL-MCNC: 91 MG/DL
HCT VFR BLD CALC: 42.1 %
HGB BLD-MCNC: 13.5 G/DL
HYALINE CASTS: 3 /LPF
IGA SER QL IEP: 467 MG/DL
IGG SER QL IEP: 1440 MG/DL
IGM SER QL IEP: 57 MG/DL
IMM GRANULOCYTES NFR BLD AUTO: 0.4 %
KETONES URINE: NEGATIVE
LEUKOCYTE ESTERASE URINE: ABNORMAL
LYMPHOCYTES # BLD AUTO: 1.61 K/UL
LYMPHOCYTES NFR BLD AUTO: 23.6 %
MAN DIFF?: NORMAL
MCHC RBC-ENTMCNC: 28.6 PG
MCHC RBC-ENTMCNC: 32.1 GM/DL
MCV RBC AUTO: 89.2 FL
MICROSCOPIC-UA: NORMAL
MONOCYTES # BLD AUTO: 0.63 K/UL
MONOCYTES NFR BLD AUTO: 9.2 %
NEUTROPHILS # BLD AUTO: 4.18 K/UL
NEUTROPHILS NFR BLD AUTO: 61.2 %
NITRITE URINE: NEGATIVE
PH URINE: 6.5
PLATELET # BLD AUTO: 233 K/UL
POTASSIUM SERPL-SCNC: 4.5 MMOL/L
PROT SERPL-MCNC: 7 G/DL
PROTEIN URINE: NORMAL
RBC # BLD: 4.72 M/UL
RBC # FLD: 13.2 %
RED BLOOD CELLS URINE: 3 /HPF
SODIUM SERPL-SCNC: 140 MMOL/L
SPECIFIC GRAVITY URINE: 1.01
SQUAMOUS EPITHELIAL CELLS: 12 /HPF
UROBILINOGEN URINE: NORMAL
WBC # FLD AUTO: 6.83 K/UL
WHITE BLOOD CELLS URINE: 29 /HPF

## 2023-03-14 RX ORDER — METHYLPREDNISOLONE 125 MG/2ML
125 INJECTION, POWDER, LYOPHILIZED, FOR SOLUTION INTRAMUSCULAR; INTRAVENOUS
Qty: 0 | Refills: 0 | Status: COMPLETED | OUTPATIENT
Start: 2023-03-14 | End: 1900-01-01

## 2023-03-14 RX ORDER — CETIRIZINE HYDROCHLORIDE 10 MG/1
10 CAPSULE, LIQUID FILLED ORAL
Refills: 0 | Status: COMPLETED | OUTPATIENT
Start: 2023-03-14 | End: 1900-01-01

## 2023-03-14 RX ADMIN — RITUXIMAB MG/50ML: 10 INJECTION, SOLUTION INTRAVENOUS at 00:00

## 2023-03-14 RX ADMIN — Medication 0 MG: at 00:00

## 2023-03-14 RX ADMIN — CETIRIZINE HYDROCHLORIDE 0 MG: 10 CAPSULE, LIQUID FILLED ORAL at 00:00

## 2023-03-14 RX ADMIN — METHYLPREDNISOLONE SODIUM SUCCINATE 0 MG: 125 INJECTION, POWDER, FOR SOLUTION INTRAMUSCULAR; INTRAVENOUS at 00:00

## 2023-03-17 ENCOUNTER — NON-APPOINTMENT (OUTPATIENT)
Age: 68
End: 2023-03-17

## 2023-03-17 ENCOUNTER — APPOINTMENT (OUTPATIENT)
Dept: ORTHOPEDIC SURGERY | Facility: CLINIC | Age: 68
End: 2023-03-17
Payer: MEDICARE

## 2023-03-17 VITALS
HEIGHT: 64 IN | WEIGHT: 138 LBS | DIASTOLIC BLOOD PRESSURE: 77 MMHG | HEART RATE: 64 BPM | SYSTOLIC BLOOD PRESSURE: 117 MMHG | BODY MASS INDEX: 23.56 KG/M2

## 2023-03-17 PROCEDURE — 99214 OFFICE O/P EST MOD 30 MIN: CPT

## 2023-03-17 PROCEDURE — 73130 X-RAY EXAM OF HAND: CPT | Mod: RT

## 2023-03-17 PROCEDURE — 73110 X-RAY EXAM OF WRIST: CPT | Mod: LT

## 2023-03-17 NOTE — ADDENDUM
[FreeTextEntry1] : I, Rosaline Shannon, acted solely as a scribe for Dr. Stevenson on this date on 03/17/2023.

## 2023-03-17 NOTE — CONSULT LETTER
[Dear  ___] : Dear  [unfilled], [Consult Letter:] : I had the pleasure of evaluating your patient, [unfilled]. [Please see my note below.] : Please see my note below. [Consult Closing:] : Thank you very much for allowing me to participate in the care of this patient.  If you have any questions, please do not hesitate to contact me. [Sincerely,] : Sincerely, [FreeTextEntry3] : Glenn Stevenson M.D.\par Surgery of the Hand and Upper Extremity\par Orthopaedic Surgery\par Chief, Hand Service, [facility]\par Director, Hand Service, [facility]\par  Of Orthopaedic Surgery, [facility] [employer] [facility]\par [facility] Email: [email or web address] Office Phone: [phone]

## 2023-03-17 NOTE — END OF VISIT
[FreeTextEntry3] : This note was written by Rosaline Shannon on 03/17/2023 acting solely as a scribe for Dr. Glenn Stevenson.\par  \par All medical record entries made by the Scribe were at my, Dr. Glenn Stevenson, direction and personally dictated by me on 03/17/2023. I have personally reviewed the chart and agree that the record accurately reflects my personal performance of the history, physical exam, assessment and plan.

## 2023-03-17 NOTE — HISTORY OF PRESENT ILLNESS
[Right] : right hand dominant [FreeTextEntry1] : She comes in today for evaluation of right little finger pain x 1 year. She denies injury. She states her pain is constant and rather severe in nature. She also complains of numbness and tingling. She has had prior surgery to the right hand but is currently most bothered by the right little finger. Her rheumatologist, Dr. Marianna Pacheco referred her here for surgical consult. She takes Tylenol prn. She rates her pain as a 9 out of 10 at this time. \par \par She has a medical history which includes rheumatoid arthritis. She takes Abadacept, which is an immunosuppressant injection. \par \par She takes a baby aspirin daily.\par \par She was evaluated with a Bengali speaking , Elina lima LPN at our office. \par \par She was referred by Dr. Marianna Pacheco.

## 2023-03-17 NOTE — DISCUSSION/SUMMARY
[FreeTextEntry1] : She has findings consistent with rheumatoid arthritis, mostly affecting the right hand, with rheumatoid arthritis at the right little finger MCP joint with volar ulnar subluxation of the right little finger.  She also has less notable degenerative changes at the other digits of the right hand and left hand with swan-neck deformities.\par \par I had a discussion with the patient regarding today's visit, the prognosis of this diagnosis, and treatment recommendations and options. At this time, given the severity of arthritis seen at the right little finger MCP joint, I recommended operative management. Surgery would consist of a right little finger MCP joint arthroplasty and realignment/centralization of the extensor tendon. While I did tell her, the hand will not be perfect even postoperatively, it will be significantly improved than it currently is. She understands and stated she would like to proceed with a right little finger MCP joint arthroplasty. She will speak with our surgical scheduler and be scheduled for surgery in the near future. \par \par Finally, with regard to the swan-neck deformities, I told her that if these become more symptomatic in the future, then these can be addressed surgically.  I would not recommend MCP joint arthroplasties at the other digits of the right hand, as her degenerative changes are relatively mild on radiographs and her symptoms are relatively mild.\par \par -  The nature and purposes of the operation/procedure was discussed in detail.  I discussed the surgical procedure in detail, as well as expected postoperative recovery and outcome.\par -  Possible risks, benefits, and complications (from known and unknown causes) of the procedure were discussed in detail.  \par -  Possible non-operative alternatives to the proposed treatment were discussed in detail.  \par -  She was told that possible risks/complications include, but are not limited to:  Infection, nerve or vessel injury, stiffness, painful scar, poor outcome, need for additional surgical procedures, and other unforeseen complications.  \par -  In addition, the possibility of an "unsuccessful outcome," despite "successful surgery," was discussed with her.  She understands that results of reconstruction after rheumatoid arthritis can be unpredictable, and given her history and her medications, she is a potential risk for further problems in the future and the possible need for further surgery.\par -  The patient fully understands these risks and wishes to proceed.  \par -  I had a lengthy discussion with the patient regarding today's visit, the diagnosis, and my surgical treatment recommendations.  The patient has agreed to this plan of management and has expressed full understanding.  All questions were fully answered to the patient's satisfaction. 	\par \par My cumulative time spent on this visit included: Preparation for the visit, review of the medical records, review of pertinent diagnostic studies, examination and counseling of the patient on the above diagnosis, treatment plan and prognosis, orders of diagnostic tests, medication and/or appropriate procedures and documentation in the medical records of today's visit.

## 2023-03-17 NOTE — PHYSICAL EXAM
[de-identified] : - Constitutional: This is a female in no obvious distress.  \par - Psych: Patient is alert and oriented to person, place and time.  Patient has a normal mood and affect.\par - Cardiovascular: Normal pulses throughout the upper extremities.  No significant varicosities are noted in the upper extremities. \par - Neuro: Strength and sensation are intact throughout the upper extremities.  Patient has normal coordination.\par - Respiratory:  Patient exhibits no evidence of shortness of breath or difficulty breathing.\par - Skin: No rashes, lesions, or other abnormalities are noted in the upper extremities.\par \par --- \par \par Examination of both hands demonstrates obvious rheumatoid arthritis.  Examination of her right hand demonstrates a little finger DIP joint fusion and a right thumb IP joint fusion.  There is obvious ulnar subluxation of the little finger ulnarly which cannot be brought into the palm.  She has swan-neck deformities of the index, middle and ring fingers, most notably at the middle finger.  These fingers can be flexion to the palm.  There is no evidence of trigger finger.  She does have some tenderness along the dorsal wrist capsule.  She is neurovascular intact distally.\par \par She also has rheumatologic deformities at the left hand which are less notable than the right hand with swan-neck deformities most notably at the index and middle fingers. [de-identified] : PA, lateral, and oblique radiographs of the bilateral wrists and hands demonstrate rheumatologic arthritis at the right little finger MCP joint with volar ulnar subluxation.  She is status post right thumb IP joint fusion and right little finger IP joint fusion.  There are milder degenerative changes noted at the right and left index finger MCP joints and mild degenerative changes at the DIP joints of the digits.  There are mild degenerative changes at the MCP joints as well as moderate rheumatologic arthritis at the right wrist radiocarpal joint.

## 2023-03-18 ENCOUNTER — APPOINTMENT (OUTPATIENT)
Dept: CT IMAGING | Facility: CLINIC | Age: 68
End: 2023-03-18
Payer: MEDICARE

## 2023-03-18 ENCOUNTER — OUTPATIENT (OUTPATIENT)
Dept: OUTPATIENT SERVICES | Facility: HOSPITAL | Age: 68
LOS: 1 days | End: 2023-03-18
Payer: MEDICARE

## 2023-03-18 ENCOUNTER — APPOINTMENT (OUTPATIENT)
Dept: RADIOLOGY | Facility: CLINIC | Age: 68
End: 2023-03-18
Payer: MEDICARE

## 2023-03-18 DIAGNOSIS — R05.9 COUGH, UNSPECIFIED: ICD-10-CM

## 2023-03-18 DIAGNOSIS — M06.9 RHEUMATOID ARTHRITIS, UNSPECIFIED: ICD-10-CM

## 2023-03-18 DIAGNOSIS — Z00.8 ENCOUNTER FOR OTHER GENERAL EXAMINATION: ICD-10-CM

## 2023-03-18 DIAGNOSIS — M25.512 PAIN IN LEFT SHOULDER: ICD-10-CM

## 2023-03-18 DIAGNOSIS — R07.9 CHEST PAIN, UNSPECIFIED: ICD-10-CM

## 2023-03-18 PROCEDURE — 71250 CT THORAX DX C-: CPT

## 2023-03-18 PROCEDURE — 73030 X-RAY EXAM OF SHOULDER: CPT

## 2023-03-18 PROCEDURE — 73030 X-RAY EXAM OF SHOULDER: CPT | Mod: 26,LT,RT

## 2023-03-18 PROCEDURE — 71250 CT THORAX DX C-: CPT | Mod: 26

## 2023-03-21 RX ORDER — RITUXIMAB-ABBS 10 MG/ML
500 INJECTION, SOLUTION INTRAVENOUS
Refills: 0 | Status: COMPLETED | OUTPATIENT
Start: 2023-03-21 | End: 1900-01-01

## 2023-03-22 ENCOUNTER — NON-APPOINTMENT (OUTPATIENT)
Age: 68
End: 2023-03-22

## 2023-03-22 ENCOUNTER — APPOINTMENT (OUTPATIENT)
Dept: INTERNAL MEDICINE | Facility: CLINIC | Age: 68
End: 2023-03-22
Payer: MEDICARE

## 2023-03-22 VITALS
SYSTOLIC BLOOD PRESSURE: 120 MMHG | WEIGHT: 138 LBS | OXYGEN SATURATION: 97 % | DIASTOLIC BLOOD PRESSURE: 80 MMHG | BODY MASS INDEX: 23.56 KG/M2 | HEIGHT: 64 IN | HEART RATE: 73 BPM | TEMPERATURE: 97.3 F

## 2023-03-22 DIAGNOSIS — R07.89 OTHER CHEST PAIN: ICD-10-CM

## 2023-03-22 PROCEDURE — T1013: CPT

## 2023-03-22 PROCEDURE — 99214 OFFICE O/P EST MOD 30 MIN: CPT | Mod: 25

## 2023-03-22 NOTE — PHYSICAL EXAM
[Normal Sclera/Conjunctiva] : normal sclera/conjunctiva [Normal TMs] : both tympanic membranes were normal [No Lymphadenopathy] : no lymphadenopathy [No Carotid Bruits] : no carotid bruits [No Edema] : there was no peripheral edema [Normal] : affect was normal and insight and judgment were intact [de-identified] : Mallampati Class I [de-identified] : RA changes of hands bilat [de-identified] : very anxious and agitated

## 2023-03-22 NOTE — INTERPRETER SERVICES
[[employer] ] : provided by [employer]   [Time Spent: ____ minutes] : Total time spent using  services: [unfilled] minutes. The patient's primary language is not English thus required  services. [Interpreters_IDNumber] : [id number] [TWNoteComboBox1] : Dominican

## 2023-03-22 NOTE — HISTORY OF PRESENT ILLNESS
[No Pertinent Cardiac History] : no history of aortic stenosis, atrial fibrillation, coronary artery disease, recent myocardial infarction, or implantable device/pacemaker [No Pertinent Pulmonary History] : no history of asthma, COPD, sleep apnea, or smoking [No Adverse Anesthesia Reaction] : no adverse anesthesia reaction in self or family member [Poor (<4 METs)] : Poor (<4 METs) [Chronic Anticoagulation] : no chronic anticoagulation [Chronic Kidney Disease] : no chronic kidney disease [Diabetes] : no diabetes [FreeTextEntry1] : right hand surgery [FreeTextEntry2] : 04/18/2023 [FreeTextEntry3] : [facility] [FreeTextEntry4] : 69 yo F with RA, here for preop for release of [place]-neck 4th right finger. Pt has had previous hand surgeries, and thinks she has had GA many years ago, did ok. No h/o VTE of bleeding. \par Pt has longstanding an continued (today) c/o chest pain fatigue and SOB, seen by cardiology, deemed atypical and non-cardiac in origin.  [FreeTextEntry6] : see above, pt endorses some of these symptoms [FreeTextEntry7] : CT heart coronary 9/1/2021\par Mid LAD 50%\par Cx 30%\par \par Echo: EF 63%, mild MR, normal LV size and function, borderline pHTN

## 2023-03-22 NOTE — ASSESSMENT
[High Risk Surgery - Intraperitoneal, Intrathoracic or Supringuinal Vascular Procedures] : High Risk Surgery - Intraperitoneal, Intrathoracic or Supringuinal Vascular Procedures - No (0) [Ischemic Heart Disease] : Ischemic Heart Disease  - Yes (1) [Congestive Heart Failure] : Congestive Heart Failure - No (0) [Prior Cerebrovascular Accident or TIA] : Prior Cerebrovascular Accident or TIA - No (0) [Creatinine >= 2mg/dL (1 Point)] : Creatinine >= 2mg/dL - No (0) [Insulin-dependent Diabetic (1 Point)] : Insulin-dependent Diabetic - No (0) [1] : 1 , RCRI Class: II, Risk of Post-Op Cardiac Complications: 6.0%, 95% CI for Risk Estimate: 4.9% - 7.4% [Patient Optimized for Surgery] : Patient optimized for surgery [No Further Testing Recommended] : no further testing recommended [Continue medications as is] : Continue current medications [As per surgery] : as per surgery [FreeTextEntry4] : Pt is moderate risk for very los risk surgery, may proceed, reviewed all her oral meds, she should take all of these as prescribed. Pt asked about an infusion she gets from Rhe[facility], whether she should take that in March or April, and I instructed her to ask her RA doctor about this. I discussed her symptoms with her cardiologist, these are stable and not c/w CAD, she may proceed with the surgery.

## 2023-03-22 NOTE — RESULTS/DATA
[de-identified] : Prior cardiac w/u reviewed, pt with evidence of non-obstructive CVD, she is on appropriate medications for this.

## 2023-03-23 ENCOUNTER — NON-APPOINTMENT (OUTPATIENT)
Age: 68
End: 2023-03-23

## 2023-03-23 ENCOUNTER — APPOINTMENT (OUTPATIENT)
Dept: RHEUMATOLOGY | Facility: CLINIC | Age: 68
End: 2023-03-23
Payer: MEDICARE

## 2023-03-23 PROCEDURE — 99443: CPT

## 2023-03-24 ENCOUNTER — NON-APPOINTMENT (OUTPATIENT)
Age: 68
End: 2023-03-24

## 2023-03-27 ENCOUNTER — APPOINTMENT (OUTPATIENT)
Dept: GASTROENTEROLOGY | Facility: CLINIC | Age: 68
End: 2023-03-27
Payer: MEDICARE

## 2023-03-27 ENCOUNTER — NON-APPOINTMENT (OUTPATIENT)
Age: 68
End: 2023-03-27

## 2023-03-27 VITALS
DIASTOLIC BLOOD PRESSURE: 68 MMHG | HEIGHT: 64 IN | HEART RATE: 67 BPM | WEIGHT: 138 LBS | OXYGEN SATURATION: 97 % | BODY MASS INDEX: 23.56 KG/M2 | TEMPERATURE: 97.3 F | SYSTOLIC BLOOD PRESSURE: 99 MMHG

## 2023-03-27 PROCEDURE — 99204 OFFICE O/P NEW MOD 45 MIN: CPT

## 2023-03-28 ENCOUNTER — NON-APPOINTMENT (OUTPATIENT)
Age: 68
End: 2023-03-28

## 2023-03-29 RX ORDER — PANTOPRAZOLE 40 MG/1
40 TABLET, DELAYED RELEASE ORAL
Refills: 0 | Status: DISCONTINUED | COMMUNITY
End: 2023-03-29

## 2023-03-30 ENCOUNTER — OUTPATIENT (OUTPATIENT)
Dept: OUTPATIENT SERVICES | Facility: HOSPITAL | Age: 68
LOS: 1 days | End: 2023-03-30
Payer: MEDICARE

## 2023-03-30 VITALS
RESPIRATION RATE: 15 BRPM | DIASTOLIC BLOOD PRESSURE: 77 MMHG | HEIGHT: 61 IN | TEMPERATURE: 98 F | WEIGHT: 136.03 LBS | SYSTOLIC BLOOD PRESSURE: 117 MMHG | OXYGEN SATURATION: 98 % | HEART RATE: 55 BPM

## 2023-03-30 DIAGNOSIS — Z01.818 ENCOUNTER FOR OTHER PREPROCEDURAL EXAMINATION: ICD-10-CM

## 2023-03-30 DIAGNOSIS — Z98.1 ARTHRODESIS STATUS: Chronic | ICD-10-CM

## 2023-03-30 DIAGNOSIS — M06.9 RHEUMATOID ARTHRITIS, UNSPECIFIED: ICD-10-CM

## 2023-03-30 DIAGNOSIS — Z98.890 OTHER SPECIFIED POSTPROCEDURAL STATES: Chronic | ICD-10-CM

## 2023-03-30 LAB
ANION GAP SERPL CALC-SCNC: 3 MMOL/L — LOW (ref 5–17)
BUN SERPL-MCNC: 16 MG/DL — SIGNIFICANT CHANGE UP (ref 7–23)
CALCIUM SERPL-MCNC: 9 MG/DL — SIGNIFICANT CHANGE UP (ref 8.4–10.5)
CHLORIDE SERPL-SCNC: 105 MMOL/L — SIGNIFICANT CHANGE UP (ref 96–108)
CO2 SERPL-SCNC: 28 MMOL/L — SIGNIFICANT CHANGE UP (ref 22–31)
CREAT SERPL-MCNC: 0.78 MG/DL — SIGNIFICANT CHANGE UP (ref 0.5–1.3)
EGFR: 83 ML/MIN/1.73M2 — SIGNIFICANT CHANGE UP
GLUCOSE SERPL-MCNC: 89 MG/DL — SIGNIFICANT CHANGE UP (ref 70–99)
HCT VFR BLD CALC: 39.8 % — SIGNIFICANT CHANGE UP (ref 34.5–45)
HGB BLD-MCNC: 13 G/DL — SIGNIFICANT CHANGE UP (ref 11.5–15.5)
MCHC RBC-ENTMCNC: 29.3 PG — SIGNIFICANT CHANGE UP (ref 27–34)
MCHC RBC-ENTMCNC: 32.7 GM/DL — SIGNIFICANT CHANGE UP (ref 32–36)
MCV RBC AUTO: 89.8 FL — SIGNIFICANT CHANGE UP (ref 80–100)
NRBC # BLD: 0 /100 WBCS — SIGNIFICANT CHANGE UP (ref 0–0)
PLATELET # BLD AUTO: 249 K/UL — SIGNIFICANT CHANGE UP (ref 150–400)
POTASSIUM SERPL-MCNC: 4.1 MMOL/L — SIGNIFICANT CHANGE UP (ref 3.5–5.3)
POTASSIUM SERPL-SCNC: 4.1 MMOL/L — SIGNIFICANT CHANGE UP (ref 3.5–5.3)
RBC # BLD: 4.43 M/UL — SIGNIFICANT CHANGE UP (ref 3.8–5.2)
RBC # FLD: 13 % — SIGNIFICANT CHANGE UP (ref 10.3–14.5)
SODIUM SERPL-SCNC: 136 MMOL/L — SIGNIFICANT CHANGE UP (ref 135–145)
WBC # BLD: 8.16 K/UL — SIGNIFICANT CHANGE UP (ref 3.8–10.5)
WBC # FLD AUTO: 8.16 K/UL — SIGNIFICANT CHANGE UP (ref 3.8–10.5)

## 2023-03-30 PROCEDURE — 80048 BASIC METABOLIC PNL TOTAL CA: CPT

## 2023-03-30 PROCEDURE — 93005 ELECTROCARDIOGRAM TRACING: CPT

## 2023-03-30 PROCEDURE — 93010 ELECTROCARDIOGRAM REPORT: CPT

## 2023-03-30 PROCEDURE — G0463: CPT

## 2023-03-30 PROCEDURE — 36415 COLL VENOUS BLD VENIPUNCTURE: CPT

## 2023-03-30 PROCEDURE — 85027 COMPLETE CBC AUTOMATED: CPT

## 2023-03-30 RX ORDER — TOFACITINIB 11 MG/1
1 TABLET, FILM COATED, EXTENDED RELEASE ORAL
Qty: 0 | Refills: 0 | DISCHARGE

## 2023-03-30 RX ORDER — CHOLECALCIFEROL (VITAMIN D3) 125 MCG
1 CAPSULE ORAL
Qty: 0 | Refills: 0 | DISCHARGE

## 2023-03-30 NOTE — H&P PST ADULT - HISTORY OF PRESENT ILLNESS
This is a 69 y/o Albanian speaking female With history of rheumatoid arthritis presents with complaint of right pinky finger pain  and swelling . , scheduled for surgery

## 2023-03-30 NOTE — H&P PST ADULT - NSICDXPASTSURGICALHX_GEN_ALL_CORE_FT
PAST SURGICAL HISTORY:  Bilateral myopia s/p laser  surgery    History of thumb surgery     S/P  Section     Status post finger joint fusion

## 2023-03-30 NOTE — H&P PST ADULT - RESPIRATORY
detailed exam clear to auscultation bilaterally/no wheezes/no rales/no rhonchi/no respiratory distress

## 2023-03-30 NOTE — H&P PST ADULT - NSICDXPASTMEDICALHX_GEN_ALL_CORE_FT
PAST MEDICAL HISTORY:  Anxiety     Asthma     Chronic sinusitis     Coronary atherosclerosis     GERD (gastroesophageal reflux disease)     HLD (hyperlipidemia)     Rheumatoid arthritis

## 2023-03-30 NOTE — H&P PST ADULT - MUSCULOSKELETAL COMMENTS
right  little finger ; rheumatology deformities noted , tender on palpation right  little finger ; rheumatologic deformities noted , tender on palpation

## 2023-03-30 NOTE — H&P PST ADULT - MUSCULOSKELETAL
left little finger/decreased ROM/decreased ROM due to pain/decreased strength details… right little finger/decreased ROM/decreased ROM due to pain/decreased strength

## 2023-03-30 NOTE — H&P PST ADULT - NSICDXFAMILYHX_GEN_ALL_CORE_FT
FAMILY HISTORY:  Sibling  Still living? Yes, Estimated age: Age Unknown  Family history of breast cancer, Age at diagnosis: Age Unknown    Grandparent  Still living? Unknown  Family history of heart disease, Age at diagnosis: Age Unknown    Uncle  Still living? No  Family history of heart disease, Age at diagnosis: Age Unknown

## 2023-03-30 NOTE — H&P PST ADULT - PROBLEM SELECTOR PLAN 1
scheduled for right little finger metacarpophalangeal joint arthroplasty   will obtain medical clearance   pre op instructions

## 2023-03-30 NOTE — H&P PST ADULT - NSANTHOSAYNRD_GEN_A_CORE
No. COLEMAN screening performed.  STOP BANG Legend: 0-2 = LOW Risk; 3-4 = INTERMEDIATE Risk; 5-8 = HIGH Risk

## 2023-03-31 ENCOUNTER — NON-APPOINTMENT (OUTPATIENT)
Age: 68
End: 2023-03-31

## 2023-04-05 ENCOUNTER — APPOINTMENT (OUTPATIENT)
Dept: CARDIOLOGY | Facility: CLINIC | Age: 68
End: 2023-04-05
Payer: MEDICARE

## 2023-04-05 VITALS
BODY MASS INDEX: 23.22 KG/M2 | DIASTOLIC BLOOD PRESSURE: 77 MMHG | SYSTOLIC BLOOD PRESSURE: 118 MMHG | TEMPERATURE: 97.7 F | HEIGHT: 64 IN | HEART RATE: 68 BPM | OXYGEN SATURATION: 97 % | WEIGHT: 136 LBS

## 2023-04-05 PROBLEM — I25.10 ATHEROSCLEROTIC HEART DISEASE OF NATIVE CORONARY ARTERY WITHOUT ANGINA PECTORIS: Chronic | Status: ACTIVE | Noted: 2023-03-30

## 2023-04-05 PROBLEM — F41.9 ANXIETY DISORDER, UNSPECIFIED: Chronic | Status: ACTIVE | Noted: 2023-03-30

## 2023-04-05 PROBLEM — K21.9 GASTRO-ESOPHAGEAL REFLUX DISEASE WITHOUT ESOPHAGITIS: Chronic | Status: ACTIVE | Noted: 2023-03-30

## 2023-04-05 PROBLEM — J45.909 UNSPECIFIED ASTHMA, UNCOMPLICATED: Chronic | Status: ACTIVE | Noted: 2023-03-30

## 2023-04-05 PROCEDURE — 99214 OFFICE O/P EST MOD 30 MIN: CPT

## 2023-04-05 NOTE — HISTORY OF PRESENT ILLNESS
[FreeTextEntry1] : 4/5/2023\par \par She was seen by ortho\par Needs surgery on the right hand.\par Dr. Stevenson. \par Surgery date is april 18th.\par This will be done in [facility]\par She lives on second floor and can walk up a flight of stairs.\par BP and HR well controlled\par She always has chest pain with movement of her arms.  \par \par \par 10/12/2022\par \par She has point tenderness to palpation of the ant chest wall and left post chest wall\par no sob\par worse with movement of her arms\par BP and HR well controlled\par no exretional cp\par Echo was NORMAL in June\par \par \par \par 4/6/2022\par She complains of chest pressure\par Back pains\par Tired\par  If she moves her arms and shoulders she has pain in the chest and the arms\par Hard to sleep on the left side, She has a tightness in the area\par Fatigue with exertion\par Yesterday she cleaned her bathroom and had pain in the back\par No leg swelling. \par Exertion does not cause chest pain.\par She does get tired with exertion.\par \par She needs to have a humira infusion\par Seen by Dr. Pacheco recently \par Xeljanz is off\par \par \par Medications:\par Famotidine\par Prednisone 2.5 mg daily \par Meclizine \par Coreg 3.125mg BID\par atorvastatin 80\par Folic acid\par Aspirin 81mg daily \par \par \par 10/20/2021\par Doing ok, no exertional chest pain or sob\par Muscles are aching her in the chest and the arm\par no leg swelling\par She is on aspirin and statin therapy\par BP is well controlled\par \par \par CT heart coronary 9/1/2021\par Mid LAD 50%\par Cx 30%\par \par Echo:  EF 63%, mild MR, normal LV size and function, borderline pHTN\par \par Medications:\par Xeljanz\par Famotidine\par Prednisone\par Meclizine \par Coreg 3.125mg BID\par atorvastatin 40\par Folic acid\par Aspirin 81mg daily

## 2023-04-05 NOTE — DISCUSSION/SUMMARY
[FreeTextEntry1] : Assessment:\par 1.  Coronary Atherosclerosis\par 2.  Chest pressure - some improvement with coreg\par 3.  + Calcium score\par 4.  Family history of CAD\par 5.  RA\par 6.  Anxiety.\par \par \par  Plan:\par 1.  Continue medical therapy for now with:\par - antiplatelet - aspirin \par - statin Atorvastatin  80mg daily \par - beta blocker - coreg \par 2.  She had a normal coronary CTA with no sig stenosis, echo was normal, ECG was normal March 30th, she has no angina.  She may proceed with planned hand surgery without any additional cardiac testing.\par 3.  Labs to day to check lipid panel

## 2023-04-06 ENCOUNTER — NON-APPOINTMENT (OUTPATIENT)
Age: 68
End: 2023-04-06

## 2023-04-06 ENCOUNTER — APPOINTMENT (OUTPATIENT)
Dept: PULMONOLOGY | Facility: CLINIC | Age: 68
End: 2023-04-06
Payer: MEDICARE

## 2023-04-06 VITALS
SYSTOLIC BLOOD PRESSURE: 110 MMHG | WEIGHT: 136 LBS | HEART RATE: 58 BPM | RESPIRATION RATE: 16 BRPM | TEMPERATURE: 97.6 F | OXYGEN SATURATION: 97 % | HEIGHT: 64 IN | BODY MASS INDEX: 23.22 KG/M2 | DIASTOLIC BLOOD PRESSURE: 70 MMHG

## 2023-04-06 DIAGNOSIS — Z87.09 PERSONAL HISTORY OF OTHER DISEASES OF THE RESPIRATORY SYSTEM: ICD-10-CM

## 2023-04-06 DIAGNOSIS — Z78.9 OTHER SPECIFIED HEALTH STATUS: ICD-10-CM

## 2023-04-06 DIAGNOSIS — Z87.39 PERSONAL HISTORY OF OTHER DISEASES OF THE MUSCULOSKELETAL SYSTEM AND CONNECTIVE TISSUE: ICD-10-CM

## 2023-04-06 DIAGNOSIS — Z86.69 PERSONAL HISTORY OF OTHER DISEASES OF THE NERVOUS SYSTEM AND SENSE ORGANS: ICD-10-CM

## 2023-04-06 DIAGNOSIS — Z82.49 FAMILY HISTORY OF ISCHEMIC HEART DISEASE AND OTHER DISEASES OF THE CIRCULATORY SYSTEM: ICD-10-CM

## 2023-04-06 DIAGNOSIS — Z87.19 PERSONAL HISTORY OF OTHER DISEASES OF THE DIGESTIVE SYSTEM: ICD-10-CM

## 2023-04-06 DIAGNOSIS — R26.89 OTHER ABNORMALITIES OF GAIT AND MOBILITY: ICD-10-CM

## 2023-04-06 DIAGNOSIS — R79.89 OTHER SPECIFIED ABNORMAL FINDINGS OF BLOOD CHEMISTRY: ICD-10-CM

## 2023-04-06 DIAGNOSIS — Z86.39 PERSONAL HISTORY OF OTHER ENDOCRINE, NUTRITIONAL AND METABOLIC DISEASE: ICD-10-CM

## 2023-04-06 DIAGNOSIS — J30.9 ALLERGIC RHINITIS, UNSPECIFIED: ICD-10-CM

## 2023-04-06 DIAGNOSIS — R09.82 POSTNASAL DRIP: ICD-10-CM

## 2023-04-06 LAB
ALBUMIN SERPL ELPH-MCNC: 4.2 G/DL
ALP BLD-CCNC: 70 U/L
ALT SERPL-CCNC: 10 U/L
ANION GAP SERPL CALC-SCNC: 13 MMOL/L
AST SERPL-CCNC: 16 U/L
BASOPHILS # BLD AUTO: 0.04 K/UL
BASOPHILS NFR BLD AUTO: 0.6 %
BILIRUB SERPL-MCNC: 0.3 MG/DL
BUN SERPL-MCNC: 12 MG/DL
CALCIUM SERPL-MCNC: 9.6 MG/DL
CHLORIDE SERPL-SCNC: 103 MMOL/L
CHOLEST SERPL-MCNC: 156 MG/DL
CO2 SERPL-SCNC: 25 MMOL/L
CREAT SERPL-MCNC: 0.75 MG/DL
EGFR: 87 ML/MIN/1.73M2
EOSINOPHIL # BLD AUTO: 0.32 K/UL
EOSINOPHIL NFR BLD AUTO: 5 %
ESTIMATED AVERAGE GLUCOSE: 120 MG/DL
GLUCOSE SERPL-MCNC: 92 MG/DL
HBA1C MFR BLD HPLC: 5.8 %
HCT VFR BLD CALC: 43 %
HDLC SERPL-MCNC: 70 MG/DL
HGB BLD-MCNC: 13.3 G/DL
IMM GRANULOCYTES NFR BLD AUTO: 0.2 %
LDLC SERPL CALC-MCNC: 69 MG/DL
LYMPHOCYTES # BLD AUTO: 1.55 K/UL
LYMPHOCYTES NFR BLD AUTO: 24.2 %
MAN DIFF?: NORMAL
MCHC RBC-ENTMCNC: 29.3 PG
MCHC RBC-ENTMCNC: 30.9 GM/DL
MCV RBC AUTO: 94.7 FL
MONOCYTES # BLD AUTO: 0.5 K/UL
MONOCYTES NFR BLD AUTO: 7.8 %
NEUTROPHILS # BLD AUTO: 3.98 K/UL
NEUTROPHILS NFR BLD AUTO: 62.2 %
NONHDLC SERPL-MCNC: 87 MG/DL
PLATELET # BLD AUTO: 265 K/UL
POTASSIUM SERPL-SCNC: 4.4 MMOL/L
PROT SERPL-MCNC: 7.3 G/DL
RBC # BLD: 4.54 M/UL
RBC # FLD: 13.6 %
SODIUM SERPL-SCNC: 141 MMOL/L
TRIGL SERPL-MCNC: 87 MG/DL
WBC # FLD AUTO: 6.4 K/UL

## 2023-04-06 PROCEDURE — 94618 PULMONARY STRESS TESTING: CPT

## 2023-04-06 PROCEDURE — 94727 GAS DIL/WSHOT DETER LNG VOL: CPT

## 2023-04-06 PROCEDURE — 99204 OFFICE O/P NEW MOD 45 MIN: CPT | Mod: 25

## 2023-04-06 PROCEDURE — ZZZZZ: CPT

## 2023-04-06 PROCEDURE — 95012 NITRIC OXIDE EXP GAS DETER: CPT

## 2023-04-06 PROCEDURE — 94010 BREATHING CAPACITY TEST: CPT

## 2023-04-06 PROCEDURE — 71046 X-RAY EXAM CHEST 2 VIEWS: CPT

## 2023-04-06 PROCEDURE — 94729 DIFFUSING CAPACITY: CPT

## 2023-04-06 RX ORDER — FLUTICASONE PROPIONATE 50 UG/1
50 SPRAY, METERED NASAL
Qty: 3 | Refills: 1 | Status: ACTIVE | COMMUNITY
Start: 2023-04-06 | End: 1900-01-01

## 2023-04-06 RX ORDER — AZELASTINE HYDROCHLORIDE AND FLUTICASONE PROPIONATE 137; 50 UG/1; UG/1
137-50 SPRAY, METERED NASAL
Qty: 3 | Refills: 1 | Status: DISCONTINUED | COMMUNITY
Start: 2023-04-06 | End: 2023-04-06

## 2023-04-06 RX ORDER — AZELASTINE HYDROCHLORIDE 137 UG/1
0.1 SPRAY, METERED NASAL TWICE DAILY
Qty: 3 | Refills: 1 | Status: ACTIVE | COMMUNITY
Start: 2023-04-06 | End: 1900-01-01

## 2023-04-06 RX ORDER — FLUTICASONE FUROATE, UMECLIDINIUM BROMIDE AND VILANTEROL TRIFENATATE 200; 62.5; 25 UG/1; UG/1; UG/1
200-62.5-25 POWDER RESPIRATORY (INHALATION)
Qty: 3 | Refills: 1 | Status: ACTIVE | COMMUNITY
Start: 2023-04-06 | End: 1900-01-01

## 2023-04-06 NOTE — REASON FOR VISIT
[Initial] : an initial visit [TextBox_44] : SOB, likely asthma, allergy, GERD, ILDz with bronchiectasis (likely due to rheumatoid arthritis), and ?COLEMAN

## 2023-04-06 NOTE — PHYSICAL EXAM
[No Acute Distress] : no acute distress [Normal Oropharynx] : normal oropharynx [Normal Appearance] : normal appearance [No Neck Mass] : no neck mass [Normal Rate/Rhythm] : normal rate/rhythm [Normal S1, S2] : normal s1, s2 [No Murmurs] : no murmurs [No Resp Distress] : no resp distress [Clear to Auscultation Bilaterally] : clear to auscultation bilaterally [No Abnormalities] : no abnormalities [Benign] : benign [Normal Gait] : normal gait [No Clubbing] : no clubbing [No Cyanosis] : no cyanosis [No Edema] : no edema [FROM] : FROM [Normal Color/ Pigmentation] : normal color/ pigmentation [No Focal Deficits] : no focal deficits [Oriented x3] : oriented x3 [Normal Affect] : normal affect [II] : Mallampati Class: II [TextBox_68] : I:E 1:3; Clear  [TextBox_105] : arthritic changes in hands

## 2023-04-06 NOTE — HISTORY OF PRESENT ILLNESS
[TextBox_4] : Ms. PARK is a 68 year female originally from [facility] with a history of rheumatoid arthritis, sinusitis, ASHD, constipation, myopia, fibromyalgia, GERD, HLD, IBS, thyroid nodules, heart murmur, osteoporosis, poikiloderma, rheumatoid nodules, low Vitamin D, and ILDz who now comes in for an initial pulmonary evaluation. Her chief complaint is\par \par -she notes getting SOB when she is trying to sleep, on exertion\par -she notes phlegm production that is slightly green in color\par -she notes that her balance is poor\par -she notes coughing at times\par -she notes chills and cough\par -she notes sleeping well at times\par -she notes snoring \par -she notes her sinuses are problematic at the moment \par -she notes PND \par -she denies being able to fall asleep while watching a boring TV show \par -she notes she still uses a previously prescribed inhaler at times\par -she notes a lump in her throat that needs to be cleared \par -she notes her energy levels are poor \par -she notes that she has lost weight \par -she notes exercising  (walking)\par -s/p COVID-19 infection  (1/2023)\par \par - -patient denies any headaches, nausea, vomiting, fever, sweats,, palpitations, diarrhea, constipation, dysphagia, myalgias, dizziness, leg swelling, leg pain, itchy eyes, itchy ears

## 2023-04-06 NOTE — ASSESSMENT
[FreeTextEntry1] : Ms. PARK is a 68 year female originally from [facility] with a history of rheumatoid arthritis, sinusitis, ASHD, constipation, myopia, fibromyalgia, GERD, HLD, IBS, thyroid nodules, heart murmur, osteoporosis, poikiloderma, rheumatoid nodules, low Vitamin D, and ILDz who now comes in for an initial pulmonary evaluation for SOB, likely asthma, allergy, GERD, ILDz with bronchiectasis (likely due to rheumatoid arthritis), and ?COLEMAN\par \par The patient's SOB is felt to be multifactorial:\par -poor mechanics of breathing\par -out of shape\par -Pulmonary\par -Cardiac (Galmer)\par \par Problem 1: Asthma\par -Add Trelegy 200 1 inhalation qD \par -Add Ventolin 2 puffs Q6H, pre-exercise \par -Asthma is believed to be caused by inherited (genetic) and environmental factor, but its exact cause is unknown. asthma may be triggered by allergens, lung infections, or irritants in the air. Asthma triggers are different for each person \par -Inhaler technique reviewed as well as oral hygiene technique reviewed with patient. Avoidance of cold air, extremes of temperature, rescue inhaler should be used before exercise. Order of medication reviewed with patient. Recommended use of a cool mist humidifier in the bedroom. \par \par Problem 2: Allergy\par -add Dymista 1 sniff BID \par -get Blood work to include: asthma panel, food IgE panel, IgE level, eosinophil level, vitamin D level\par -Environmental measures for allergies were encouraged including mattress and pillow cover, air purifier, and environmental controls. \par \par \par Problem 3: GERD\par -continue Protonix 40 mg BD\par -Rule of 2s: avoid eating too much, eating too late, eating too spicy, eating two hours before bed.\par - Things to avoid including overeating, spicy foods, tight clothing, eating within two hours of bed, this list is not all inclusive.\par - For treatments of reflux, possible options discussed including diet control, H2 blockers, PPIs, as well as coating motility agents discussed as treatment options. Timing of meals and proximity of last meal to sleep were discussed. If symptoms persist, a formal gastrointestinal evaluation is needed. \par \par Problem 4: IDLz (likely due to rheumatoid arthritis) - PFTs normal\par -therapy direction toward RA, can consider Ofev 100 mg BID if warranted\par -f/u HRCT chest in 1 year: 2/2024\par -complete yearly ECHO to assess for PAH - next 6/2023\par -f/u Full PFTs twice yearly with 6 minute-walk \par -Etiology will depend on the causative agent possibilities include: idiopathic pulmonary fibrosis (UIP), NSIP (nonspecific interstitial pneumonia), respiratory bronchiolitis in someone who is a smoker, drug induced lung disease, hypersensitivity pneumonitis, BOOP, sarcoidosis,  chronic congestive heart failure, rheumatologic disorder induced interstitial lung disease. Optimal diagnosing will include rheumatological panel which would include ESR, BRITTNEY, ANCA, ARNP, CCP rheumatoid factor, hypersensitivity panel, sjogren's syndrome antibodies; biopsy will be necessary to be definitively determine the etiology unless the CT scan findings are specific for UIP. Therapy will be based on diagnostics. \par \par Problem 5: Bronchiectasis\par -sputum AFb x3\par -Add Aerobika - mucus clearance device \par -Seen on the CT of the chest or chest x-ray signifies damaged bronchial tubes focal or diffuse which can be sites of recurrent infections. These areas can be colonized by various organisms including bacteria (hemophilous influenza/Pseudomonas species etc.) as well as acid fast bacilli (myobacterial disease- inclusive of TB/SUZIE etc.). Sputum either for bacteria culture/sensitivity or AFB culture and sensitivity will need to be sent if the patient has sputum- 3 specimens on consecutive days will need to be dropped at the laboratory- if the patient can produce sputum. \par \par Problem 6: ?COLEMAN (risk factors: poor sleep, ILDz, GERD, elevated Mallampati class, interrupted sleep)\par -complete home sleep study\par -Sleep apnea is associated with adverse clinical consequences which can affect most organ systems. Cardiovascular disease risk includes arrhythmias, atrial fibrillation, hypertension, coronary artery disease, and stroke. Metabolic disorders include diabetes type 2, non-alcoholic fatty liver disease. Mood disorder especially depression; and cognitive decline especially in the elderly. Associations with chronic reflux/Cook’s esophagus some but not all inclusive. \par -Reasons include arousal consistent with hypopnea; respiratory events most prominent in REM sleep or supine position; therefore sleep staging and body position are important for accurate diagnosis and estimation of AHI. \par \par Problem 7: Cardiac (Galmer)\par -Recommend cardiac follow up evaluation with cardiologist if needed \par \par Problem 8: out of shape\par -Recommended Xavier Sainz's 10-day detox diet and book.\par -Recommend "Muniq" OTC for weight loss, energy, and blood sugar\par - Weight loss, exercise and diet control were discussed and are highly encouraged. Treatment options were given such as aqua therapy, and contacting a nutritionist. Recommended to use the elliptical, stationary bike, less use of treadmill. Mindful eating was explained to the patient. Obesity is associated with worsening asthma, SOB, and potential for cardiac disease, diabetes, and other underlying medical conditions.\par \par Problem 9: Poor mechanics of breathing\par -Recommended Wim Hof and Buteyko breathing techniques\par -Recommended [email or web address] and to YouTube "aerobic exercises for seniors"\par -Proper breathing techniques were reviewed with an emphasis on exhalation. Patient instructed to breath in for 1 second and out for four seconds. Patient was encouraged not to talk while walking.\par \par Problem 10: Health Maintenance\par -recommended Sanotize anti viral nasal spray in case of viral infection\par -s/p flu shot\par -recommended strep pneumonia vaccines: Prevnar-20 vaccine, follow by Pneumo vaccine 23 one year following\par -recommended early intervention for URIs\par -recommended regular osteoporosis evaluations\par -recommended early dermatological evaluations\par -recommended after the age of 50 to the age of 70, colonoscopy every 5 years\par \par f/u in 6-8 weeks\par pt is encouraged to call or fax the office with any questions or concerns.

## 2023-04-06 NOTE — ADDENDUM
[FreeTextEntry1] : Documented by Nayan Aaron acting as a scribe for Dr. Carlito Cervantes on 04/06/2023.\par \par All medical record entries made by the Scribe were at my, Dr. Carlito Cervantes's, direction and personally dictated by me on 04/06/2023. I have reviewed the chart and agree that the record accurately reflects my personal performance of the history, physical exam, assessment and plan. I have also personally directed, reviewed, and agree with the discharge instructions.

## 2023-04-12 ENCOUNTER — NON-APPOINTMENT (OUTPATIENT)
Age: 68
End: 2023-04-12

## 2023-04-17 ENCOUNTER — TRANSCRIPTION ENCOUNTER (OUTPATIENT)
Age: 68
End: 2023-04-17

## 2023-04-17 NOTE — ASU PATIENT PROFILE, ADULT - FALL HARM RISK - HARM RISK INTERVENTIONS

## 2023-04-18 ENCOUNTER — RESULT REVIEW (OUTPATIENT)
Age: 68
End: 2023-04-18

## 2023-04-18 ENCOUNTER — OUTPATIENT (OUTPATIENT)
Dept: OUTPATIENT SERVICES | Facility: HOSPITAL | Age: 68
LOS: 1 days | End: 2023-04-18
Payer: MEDICARE

## 2023-04-18 ENCOUNTER — TRANSCRIPTION ENCOUNTER (OUTPATIENT)
Age: 68
End: 2023-04-18

## 2023-04-18 ENCOUNTER — APPOINTMENT (OUTPATIENT)
Dept: ORTHOPEDIC SURGERY | Facility: HOSPITAL | Age: 68
End: 2023-04-18

## 2023-04-18 VITALS
HEART RATE: 65 BPM | OXYGEN SATURATION: 99 % | DIASTOLIC BLOOD PRESSURE: 71 MMHG | TEMPERATURE: 98 F | RESPIRATION RATE: 19 BRPM | SYSTOLIC BLOOD PRESSURE: 123 MMHG | HEIGHT: 64 IN | WEIGHT: 135.58 LBS

## 2023-04-18 VITALS
DIASTOLIC BLOOD PRESSURE: 64 MMHG | RESPIRATION RATE: 15 BRPM | HEART RATE: 81 BPM | SYSTOLIC BLOOD PRESSURE: 108 MMHG | OXYGEN SATURATION: 95 % | TEMPERATURE: 98 F

## 2023-04-18 DIAGNOSIS — Z98.890 OTHER SPECIFIED POSTPROCEDURAL STATES: Chronic | ICD-10-CM

## 2023-04-18 DIAGNOSIS — M06.9 RHEUMATOID ARTHRITIS, UNSPECIFIED: ICD-10-CM

## 2023-04-18 DIAGNOSIS — Z01.818 ENCOUNTER FOR OTHER PREPROCEDURAL EXAMINATION: ICD-10-CM

## 2023-04-18 DIAGNOSIS — Z98.1 ARTHRODESIS STATUS: Chronic | ICD-10-CM

## 2023-04-18 PROCEDURE — 26531 REVISE KNUCKLE WITH IMPLANT: CPT | Mod: F9

## 2023-04-18 PROCEDURE — 76000 FLUOROSCOPY <1 HR PHYS/QHP: CPT

## 2023-04-18 PROCEDURE — 26530 REVISE KNUCKLE JOINT: CPT | Mod: F9

## 2023-04-18 PROCEDURE — C1776: CPT

## 2023-04-18 PROCEDURE — C1713: CPT

## 2023-04-18 PROCEDURE — 88307 TISSUE EXAM BY PATHOLOGIST: CPT

## 2023-04-18 PROCEDURE — 26437 REALIGNMENT OF TENDONS: CPT | Mod: F9,59

## 2023-04-18 PROCEDURE — 88307 TISSUE EXAM BY PATHOLOGIST: CPT | Mod: 26

## 2023-04-18 PROCEDURE — 88311 DECALCIFY TISSUE: CPT | Mod: 26

## 2023-04-18 PROCEDURE — 88311 DECALCIFY TISSUE: CPT

## 2023-04-18 DEVICE — IMPLANTABLE DEVICE: Type: IMPLANTABLE DEVICE | Site: RIGHT | Status: FUNCTIONAL

## 2023-04-18 DEVICE — K-WIRE MICROAIRE (SMOOTH) DOUBLE TROCAR 1.1MM X 4": Type: IMPLANTABLE DEVICE | Site: RIGHT | Status: FUNCTIONAL

## 2023-04-18 RX ORDER — CEFAZOLIN SODIUM 1 G
2000 VIAL (EA) INJECTION ONCE
Refills: 0 | Status: COMPLETED | OUTPATIENT
Start: 2023-04-18 | End: 2023-04-18

## 2023-04-18 RX ORDER — SODIUM CHLORIDE 9 MG/ML
1000 INJECTION, SOLUTION INTRAVENOUS
Refills: 0 | Status: DISCONTINUED | OUTPATIENT
Start: 2023-04-18 | End: 2023-04-18

## 2023-04-18 RX ORDER — CHLORHEXIDINE GLUCONATE 213 G/1000ML
1 SOLUTION TOPICAL ONCE
Refills: 0 | Status: COMPLETED | OUTPATIENT
Start: 2023-04-18 | End: 2023-04-18

## 2023-04-18 RX ORDER — APREPITANT 80 MG/1
40 CAPSULE ORAL ONCE
Refills: 0 | Status: COMPLETED | OUTPATIENT
Start: 2023-04-18 | End: 2023-04-18

## 2023-04-18 RX ORDER — PANTOPRAZOLE SODIUM 20 MG/1
40 TABLET, DELAYED RELEASE ORAL
Refills: 0 | DISCHARGE

## 2023-04-18 RX ORDER — ACETAMINOPHEN WITH CODEINE 300MG-30MG
1 TABLET ORAL
Qty: 20 | Refills: 0
Start: 2023-04-18

## 2023-04-18 RX ORDER — CARVEDILOL PHOSPHATE 80 MG/1
1 CAPSULE, EXTENDED RELEASE ORAL
Refills: 0 | DISCHARGE

## 2023-04-18 RX ORDER — OXYCODONE HYDROCHLORIDE 5 MG/1
5 TABLET ORAL ONCE
Refills: 0 | Status: DISCONTINUED | OUTPATIENT
Start: 2023-04-18 | End: 2023-04-18

## 2023-04-18 RX ORDER — ABATACEPT 125 MG/ML
750 INJECTION, SOLUTION SUBCUTANEOUS
Refills: 0 | DISCHARGE

## 2023-04-18 RX ORDER — ACETAMINOPHEN 500 MG
1000 TABLET ORAL ONCE
Refills: 0 | Status: COMPLETED | OUTPATIENT
Start: 2023-04-18 | End: 2023-04-18

## 2023-04-18 RX ORDER — ONDANSETRON 8 MG/1
4 TABLET, FILM COATED ORAL ONCE
Refills: 0 | Status: DISCONTINUED | OUTPATIENT
Start: 2023-04-18 | End: 2023-04-18

## 2023-04-18 RX ORDER — FLUTICASONE FUROATE AND VILANTEROL TRIFENATATE 100; 25 UG/1; UG/1
1 POWDER RESPIRATORY (INHALATION)
Refills: 0 | DISCHARGE

## 2023-04-18 RX ORDER — HYDROMORPHONE HYDROCHLORIDE 2 MG/ML
0.25 INJECTION INTRAMUSCULAR; INTRAVENOUS; SUBCUTANEOUS
Refills: 0 | Status: DISCONTINUED | OUTPATIENT
Start: 2023-04-18 | End: 2023-04-18

## 2023-04-18 RX ORDER — ATORVASTATIN CALCIUM 80 MG/1
1 TABLET, FILM COATED ORAL
Refills: 0 | DISCHARGE

## 2023-04-18 RX ORDER — AZELASTINE 137 UG/1
2 SPRAY, METERED NASAL
Qty: 0 | Refills: 0 | DISCHARGE

## 2023-04-18 RX ADMIN — APREPITANT 40 MILLIGRAM(S): 80 CAPSULE ORAL at 09:34

## 2023-04-18 RX ADMIN — SODIUM CHLORIDE 75 MILLILITER(S): 9 INJECTION, SOLUTION INTRAVENOUS at 12:40

## 2023-04-18 RX ADMIN — CHLORHEXIDINE GLUCONATE 1 APPLICATION(S): 213 SOLUTION TOPICAL at 09:34

## 2023-04-18 NOTE — BRIEF OPERATIVE NOTE - NSICDXBRIEFPROCEDURE_GEN_ALL_CORE_FT
PROCEDURES:  Arthroplasty, finger, MCP joint 18-Apr-2023 10:01:30  Alvin Wong  Realignment, tendon, extensor, finger 18-Apr-2023 10:01:52  Alvin Wong

## 2023-04-18 NOTE — ASU DISCHARGE PLAN (ADULT/PEDIATRIC) - ASU DISCHARGE DATE/TIME
Called into room by [facility] stating patient difficulty breathing. Noted diaphretic pt stating with labored breathing \"I cant breath\" oxygen applied as SpO2 at 83%. At 4L resp still at 40's increased to 6L. With resp at 22. 18-Apr-2023 12:41

## 2023-04-18 NOTE — ASU DISCHARGE PLAN (ADULT/PEDIATRIC) - NS MD DC FALL RISK RISK
For information on Fall & Injury Prevention, visit: [email or web address] OR  [email or web address] OR  https://www.cdc.gov/steadi/patient.html

## 2023-04-18 NOTE — ASU DISCHARGE PLAN (ADULT/PEDIATRIC) - CARE PROVIDER_API CALL
Glenn Stevenson)  Orthopaedic Surgery; Surgery of the Hand  [address]], [facility] [address]  [place] [address]  Phone: [phone]  Fax: [phone]  Follow Up Time:

## 2023-04-18 NOTE — ASU DISCHARGE PLAN (ADULT/PEDIATRIC) - ASU DC SPECIAL INSTRUCTIONSFT
DO NOT wet or remove the splint/dressing.  Elevate the extremity to reduce swelling.  No strenuous activities or lifting with the right hand.    Please follow Dr. Stevenson's instructions.    Follow up in the office on 4//23.  Please call to confirm the appointment. DO NOT wet or remove the splint/dressing.  Elevate the extremity to reduce swelling.  No strenuous activities or lifting with the right hand.    Please follow Dr. Stevenson's instructions.    Follow up in the office on 4/21/23.  Please call to confirm the appointment.

## 2023-04-21 ENCOUNTER — APPOINTMENT (OUTPATIENT)
Dept: ORTHOPEDIC SURGERY | Facility: CLINIC | Age: 68
End: 2023-04-21
Payer: MEDICARE

## 2023-04-21 PROCEDURE — 73130 X-RAY EXAM OF HAND: CPT | Mod: RT

## 2023-04-21 PROCEDURE — 29125 APPL SHORT ARM SPLINT STATIC: CPT | Mod: 58,RT

## 2023-04-21 PROCEDURE — 99024 POSTOP FOLLOW-UP VISIT: CPT

## 2023-04-21 NOTE — END OF VISIT
[FreeTextEntry3] : This note was written by Rosaline Shannon on 04/21/2023 acting solely as a scribe for Dr. Glenn Stevenson.\par  \par All medical record entries made by the Scribe were at my, Dr. Glenn Stevenson, direction and personally dictated by me on 04/21/2023. I have personally reviewed the chart and agree that the record accurately reflects my personal performance of the history, physical exam, assessment and plan.

## 2023-04-21 NOTE — HISTORY OF PRESENT ILLNESS
[de-identified] : 3 days postoperative. [de-identified] : 3 days status post right little finger MCP joint arthroplasty and extensor tendon realignment.  Date of surgery: 4/18/2023.\par \par She is experiencing postoperative pain, for which she has taken Tylenol with codeine. She complains of swelling and tingling. She rates her pain as a 6 out of 10 at this time. [de-identified] : Examination of her right hand after the splint and dressing were removed demonstrates her incision to be clean and dry.  There is no drainage or evidence of infection.  There is some swelling.  Her finger is well aligned.  She is neurovascular intact distally. [de-identified] : AP, lateral and oblique radiographs of her right hand demonstrate her little finger MCP joint arthroplasty to be in good position. [de-identified] : Stable, 3 days postoperative. [de-identified] : At this time, the sutures will remain in place and be removed in the office, in 1 week. She was placed into a well-padded and well-molded right short arm ulnar gutter fiberglass splint. She was instructed on protection, ice and activity modification. She will follow-up in 1 week for suture removal and reevaluation.

## 2023-04-26 ENCOUNTER — APPOINTMENT (OUTPATIENT)
Dept: INTERNAL MEDICINE | Facility: CLINIC | Age: 68
End: 2023-04-26
Payer: MEDICARE

## 2023-04-26 VITALS
BODY MASS INDEX: 22.53 KG/M2 | SYSTOLIC BLOOD PRESSURE: 104 MMHG | WEIGHT: 132 LBS | HEIGHT: 64 IN | OXYGEN SATURATION: 97 % | DIASTOLIC BLOOD PRESSURE: 62 MMHG | HEART RATE: 73 BPM | TEMPERATURE: 98 F

## 2023-04-26 VITALS — HEIGHT: 55 IN | SYSTOLIC BLOOD PRESSURE: 110 MMHG | DIASTOLIC BLOOD PRESSURE: 70 MMHG

## 2023-04-26 VITALS — DIASTOLIC BLOOD PRESSURE: 72 MMHG | SYSTOLIC BLOOD PRESSURE: 104 MMHG

## 2023-04-26 DIAGNOSIS — Z87.19 PERSONAL HISTORY OF OTHER DISEASES OF THE DIGESTIVE SYSTEM: ICD-10-CM

## 2023-04-26 DIAGNOSIS — Z87.39 PERSONAL HISTORY OF OTHER DISEASES OF THE MUSCULOSKELETAL SYSTEM AND CONNECTIVE TISSUE: ICD-10-CM

## 2023-04-26 DIAGNOSIS — R73.01 IMPAIRED FASTING GLUCOSE: ICD-10-CM

## 2023-04-26 DIAGNOSIS — M25.531 PAIN IN RIGHT WRIST: ICD-10-CM

## 2023-04-26 DIAGNOSIS — Z86.79 PERSONAL HISTORY OF OTHER DISEASES OF THE CIRCULATORY SYSTEM: ICD-10-CM

## 2023-04-26 DIAGNOSIS — Z87.898 PERSONAL HISTORY OF OTHER SPECIFIED CONDITIONS: ICD-10-CM

## 2023-04-26 DIAGNOSIS — R10.2 PELVIC AND PERINEAL PAIN: ICD-10-CM

## 2023-04-26 DIAGNOSIS — M79.641 PAIN IN RIGHT HAND: ICD-10-CM

## 2023-04-26 DIAGNOSIS — Z86.19 PERSONAL HISTORY OF OTHER INFECTIOUS AND PARASITIC DISEASES: ICD-10-CM

## 2023-04-26 DIAGNOSIS — Z86.39 PERSONAL HISTORY OF OTHER ENDOCRINE, NUTRITIONAL AND METABOLIC DISEASE: ICD-10-CM

## 2023-04-26 DIAGNOSIS — M79.642 PAIN IN RIGHT HAND: ICD-10-CM

## 2023-04-26 DIAGNOSIS — H81.11 BENIGN PAROXYSMAL VERTIGO, RIGHT EAR: ICD-10-CM

## 2023-04-26 DIAGNOSIS — M25.532 PAIN IN RIGHT WRIST: ICD-10-CM

## 2023-04-26 DIAGNOSIS — R10.13 EPIGASTRIC PAIN: ICD-10-CM

## 2023-04-26 DIAGNOSIS — L98.2 FEBRILE NEUTROPHILIC DERMATOSIS [SWEET]: ICD-10-CM

## 2023-04-26 DIAGNOSIS — R14.3 FLATULENCE: ICD-10-CM

## 2023-04-26 DIAGNOSIS — R06.02 SHORTNESS OF BREATH: ICD-10-CM

## 2023-04-26 DIAGNOSIS — M19.049 PRIMARY OSTEOARTHRITIS, UNSPECIFIED HAND: ICD-10-CM

## 2023-04-26 DIAGNOSIS — K21.9 GASTRO-ESOPHAGEAL REFLUX DISEASE W/OUT ESOPHAGITIS: ICD-10-CM

## 2023-04-26 DIAGNOSIS — Z86.69 PERSONAL HISTORY OF OTHER DISEASES OF THE NERVOUS SYSTEM AND SENSE ORGANS: ICD-10-CM

## 2023-04-26 DIAGNOSIS — H61.23 IMPACTED CERUMEN, BILATERAL: ICD-10-CM

## 2023-04-26 DIAGNOSIS — Z00.00 ENCOUNTER FOR GENERAL ADULT MEDICAL EXAMINATION W/OUT ABNORMAL FINDINGS: ICD-10-CM

## 2023-04-26 DIAGNOSIS — M20.031 SWAN-NECK DEFORMITY OF RIGHT FINGER(S): ICD-10-CM

## 2023-04-26 DIAGNOSIS — J01.10 ACUTE FRONTAL SINUSITIS, UNSPECIFIED: ICD-10-CM

## 2023-04-26 DIAGNOSIS — Z01.818 ENCOUNTER FOR OTHER PREPROCEDURAL EXAMINATION: ICD-10-CM

## 2023-04-26 PROCEDURE — 99397 PER PM REEVAL EST PAT 65+ YR: CPT

## 2023-04-26 RX ORDER — ALPRAZOLAM 0.25 MG/1
0.25 TABLET ORAL
Qty: 2 | Refills: 0 | Status: COMPLETED | COMMUNITY
Start: 2022-10-31 | End: 2022-11-01

## 2023-04-26 NOTE — HISTORY OF PRESENT ILLNESS
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\par Urticaria vs papules clusters resolve 1-3 weeks\par \par May try topical Aquaphor\par \par Reported Crohn's  sent GI  GI advises NOT Crohn's  ileal erosions  NSAID induced vs asx term ileitis\par RELEASE of R Hardesty neck 4th finger\par Noncardiac chest pain\par coronary CT 2021 mild LAD 50%  CX 30%   EF 63% mild MR  borderline pulm HTN  poor METS < 4\par \par No further skin pruritis\par Always has non exertional chest pressure when in bed worse when lay on her LEFT side\par NO RHEUM TREATMENT until weeks after [id number] Hardesty neck deformity release \par \par Taking tylenol 1000 BID  only\par STABLE MSK Chest Pain Sometimes when upset\par \par \par

## 2023-04-28 ENCOUNTER — APPOINTMENT (OUTPATIENT)
Dept: ORTHOPEDIC SURGERY | Facility: CLINIC | Age: 68
End: 2023-04-28
Payer: MEDICARE

## 2023-04-28 PROCEDURE — 99024 POSTOP FOLLOW-UP VISIT: CPT

## 2023-04-28 NOTE — END OF VISIT
[FreeTextEntry3] : This note was written by Rosaline Shannon on 04/28/2023 acting solely as a scribe for Dr. Glenn Stevenson.\par  \par All medical record entries made by the Scribe were at my, Dr. Glenn Stevenson, direction and personally dictated by me on 04/28/2023. I have personally reviewed the chart and agree that the record accurately reflects my personal performance of the history, physical exam, assessment and plan.

## 2023-04-28 NOTE — ADDENDUM
[FreeTextEntry1] : I, Rosaline Shannon, acted solely as a scribe for Dr. Stevenson on this date on 04/28/2023.

## 2023-04-28 NOTE — HISTORY OF PRESENT ILLNESS
[de-identified] : 10 days postoperative. [de-identified] : 10 days status post right little finger MCP joint arthroplasty and extensor tendon realignment.  Date of surgery: 4/18/2023.\par \par She is having continued pain at the little finger, which is exacerbated with use of the hand. She rates her pain as a 5 out of 10 at this time. [de-identified] : Examination of her right hand after the splint and dressing were removed demonstrates her incision to be healing well.  There is decreased swelling.  Her finger is well aligned.  She is neurovascularly intact distally. [de-identified] : Stable, 10 days postoperative. [de-identified] : The sutures were removed and Steri-Strips were applied.  She was instructed on local wound care and referred for hand therapy to be fitted with a custom molded splint.  In the meantime, the fiberglass splint was reapplied.  She was instructed on gentle range of motion exercises to the digits.  She will follow up in 2 weeks.

## 2023-05-02 NOTE — ASSESSMENT
[FreeTextEntry1] : 68F, RA on rituxan, pulmonary fibrosis awaiting appointment w pulmonology, osteoporosis, referred for abdominal pain. No fhx of GI malignancy. \par \par \par # Abdominal pain \par - likley acid mediated process \par - start trial of omeprazole 40mg po qday \par - avoid nsaids\par - lifestyle modifcations for GERD reviewed \par - consider EGD after pulm eval \par \par # Dysphagia \par - xray esophagram to further evaluate\par - egd vs motility studies depending on findings and after pulm eval \par \par # Constipation \par - start miralax bowel regimen and titrate as needed  \par - increase fiber and hydration \par \par # ? Terminal Ileum Erosions \par - seen on cscope in 2016 but normal TI in 2018. \par - No clear imaging findings to suggest crohns disease \par - unclear significance of previously seen terminal ileal erosions --> ddx includes asymptomatic terminal ileitis, medication (nsaid) induced, vs other etiology\par - will continue to monitor \par - if symptoms suggestive of IBD, will consider VCE vs CTE or MRE\par \par RTC after xray esophagram \par \par

## 2023-05-02 NOTE — HISTORY OF PRESENT ILLNESS
[FreeTextEntry1] :  # [id number] \par \par 68F, RA on rituxan, pulmonary fibrosis awaiting appointment w pulmonology, osteoporosis, referred for abdominal pain.\par \par Patient reports epigastric abdominal pain. \par + burning sensation \par + sensation of reflux \par Worse with eating. \par Specifically milk, cheese, certain vegetables make it worse. \par No NSAID use. \par \par She reports intermittent dysphagia to both solids and liquids. \par Does not happen every time she eats or drinks. \par Feels things get stuck in back of throat and upper chest. \par \par Bowel movements are usually small and hard. \par + straining\par Sometimes drinks prune juice. \par \par Denies n/v, weight loss, melena, hematochezia. \par \par EGD 2018 - mild erythema in the GE junction, mild gastritis, normal duodenal bulb and 2nd portion\par Colonoscopy 2018 - grade I hemorrhoids, moderately severe diverticulosis in sigmoid and descending colon, normal mucosa in the TI \par Colonoscopy 2016 - TI with two nonbleeding erosions, otherwise normal. BIopsies showing active ileitis \par \par 2016 - CT - no abnl bowel wall thickening to suggest crohns disease \par \par \par \par \par

## 2023-05-03 ENCOUNTER — NON-APPOINTMENT (OUTPATIENT)
Age: 68
End: 2023-05-03

## 2023-05-03 ENCOUNTER — APPOINTMENT (OUTPATIENT)
Dept: RHEUMATOLOGY | Facility: CLINIC | Age: 68
End: 2023-05-03

## 2023-05-03 ENCOUNTER — APPOINTMENT (OUTPATIENT)
Dept: GASTROENTEROLOGY | Facility: CLINIC | Age: 68
End: 2023-05-03
Payer: MEDICARE

## 2023-05-03 VITALS
BODY MASS INDEX: 23.56 KG/M2 | DIASTOLIC BLOOD PRESSURE: 78 MMHG | SYSTOLIC BLOOD PRESSURE: 122 MMHG | WEIGHT: 138 LBS | OXYGEN SATURATION: 94 % | TEMPERATURE: 97.3 F | HEIGHT: 64 IN | HEART RATE: 67 BPM

## 2023-05-03 DIAGNOSIS — R13.19 OTHER DYSPHAGIA: ICD-10-CM

## 2023-05-03 DIAGNOSIS — K63.3 ULCER OF INTESTINE: ICD-10-CM

## 2023-05-03 PROCEDURE — 99213 OFFICE O/P EST LOW 20 MIN: CPT

## 2023-05-12 ENCOUNTER — APPOINTMENT (OUTPATIENT)
Dept: ORTHOPEDIC SURGERY | Facility: CLINIC | Age: 68
End: 2023-05-12
Payer: MEDICARE

## 2023-05-12 PROCEDURE — 73130 X-RAY EXAM OF HAND: CPT | Mod: RT

## 2023-05-12 PROCEDURE — 99024 POSTOP FOLLOW-UP VISIT: CPT

## 2023-05-15 PROBLEM — R13.19 OTHER DYSPHAGIA: Status: ACTIVE | Noted: 2023-05-15

## 2023-05-15 PROBLEM — K63.3 ILEAL EROSIONS: Status: ACTIVE | Noted: 2023-03-29

## 2023-05-15 NOTE — HISTORY OF PRESENT ILLNESS
[FreeTextEntry1] :  # [id number] \par \par Here for f/u visit. \par Last seen in March 2023 for abdominal pain and dysphagia. \par Feels some mild improvement after starting PPI but still has intermittent symptoms \par xray esophagram scheduled for later this month \par \par Taking miralax \par Bowel movements are now normal \par Denies hematocehzia, melena, n/v, weight loss \par \par \par \par \par \par \par

## 2023-05-15 NOTE — ASSESSMENT
[FreeTextEntry1] : 68F, RA on rituxan, pulmonary fibrosis following w/ pulmonology, osteoporosis, referred for abdominal pain. No fhx of GI malignancy. \par \par \par # Abdominal pain \par - likely acid mediated process \par - improved on PPI\par - avoid nsaids\par - lifestyle modifcations for GERD reviewed \par - consider EGD after pulm eval \par \par # Dysphagia \par - xray esophagram to further evaluate -- scheduled for later this month \par - egd vs motility studies depending on findings and after pulm eval \par \par # Constipation \par - continue miralax bowel regimen \par - increase fiber and hydration \par \par # ? Terminal Ileum Erosions \par - seen on cscope in 2016 but normal TI in 2018. \par - No clear imaging findings to suggest crohns disease \par - unclear significance of previously seen terminal ileal erosions --> ddx includes asymptomatic terminal ileitis, medication (nsaid) induced, vs other etiology\par - will continue to monitor \par - if symptoms suggestive of IBD, will consider VCE vs CTE or MRE\par \par RTC after xray esophagram

## 2023-05-16 ENCOUNTER — OUTPATIENT (OUTPATIENT)
Dept: OUTPATIENT SERVICES | Facility: HOSPITAL | Age: 68
LOS: 1 days | End: 2023-05-16

## 2023-05-16 ENCOUNTER — APPOINTMENT (OUTPATIENT)
Dept: RADIOLOGY | Facility: HOSPITAL | Age: 68
End: 2023-05-16
Payer: MEDICARE

## 2023-05-16 DIAGNOSIS — Z98.1 ARTHRODESIS STATUS: Chronic | ICD-10-CM

## 2023-05-16 DIAGNOSIS — Z98.890 OTHER SPECIFIED POSTPROCEDURAL STATES: Chronic | ICD-10-CM

## 2023-05-16 DIAGNOSIS — R13.10 DYSPHAGIA, UNSPECIFIED: ICD-10-CM

## 2023-05-16 PROCEDURE — 74220 X-RAY XM ESOPHAGUS 1CNTRST: CPT | Mod: 26

## 2023-05-17 ENCOUNTER — APPOINTMENT (OUTPATIENT)
Dept: RHEUMATOLOGY | Facility: CLINIC | Age: 68
End: 2023-05-17
Payer: MEDICARE

## 2023-05-17 VITALS
HEIGHT: 64 IN | SYSTOLIC BLOOD PRESSURE: 115 MMHG | OXYGEN SATURATION: 98 % | HEART RATE: 74 BPM | TEMPERATURE: 97.1 F | DIASTOLIC BLOOD PRESSURE: 77 MMHG | BODY MASS INDEX: 22.71 KG/M2 | RESPIRATION RATE: 16 BRPM | WEIGHT: 133 LBS

## 2023-05-17 PROCEDURE — 99215 OFFICE O/P EST HI 40 MIN: CPT

## 2023-05-17 NOTE — REASON FOR VISIT
[Follow-Up: _____] : a [unfilled] follow-up visit [Patient Declined  Services] : - None: Patient declined  services [FreeTextEntry1] : RA, high risk medication use, OA, OP, ILD

## 2023-05-17 NOTE — HISTORY OF PRESENT ILLNESS
[CCP] : Cyclic citrullinated peptide (CCP) antibody [Erosions] : erosions [Interstitial Lung Disease] : interstitial lung disease [Joint Pain] : joint pain [FreeTextEntry1] : JESSA PARK is a 68 year  old female, seen on today  for\par \par RA\par Rituxan on 8-29-22 and 9-12-22\par joint pain is much better since Rituxan including knee but the pain has started to come back and she would like another infusion. \par she is doing physcial therapy for her hand post op.  (surgery was 4-)\par \par still with pain in the left shoulder and lowers back \par \par still with some cough \par on ppi for reflux\par seeing gi and having furhter testing \par \par OA - at baseline \par \par OP \par dexa in  with openia and low frax but DEXA from  with OSTEOPOROSIS \par \par \par neck pain - no c1/c2 disease on mri cervical spine (1-2023)\par \par  [Joint Swelling] : no joint swelling [Rash] : no rash [Shortness of Breath] : no shortness of breath [Ocular Symptoms] : no ocular symptoms [Dysphonia] : no dysphonia [Morning Stiffness] : no morning stiffness [Chest Pain] : no chest pain [Fatigue] : no fatigue [TextBox_2] : 1990's [TextBox_38] : MTX [TextBox_42] : humira, enbrel, remicade (2013-208), simponi (2018 x 2 doses), Xeljanz (6-2018-> 4/2022);  Orencia (4-2022 -> 8-2022) [TextBox_44] : Rituxan (8/29 and 9/12) [TextBox_70] : feels much better since the rituxan \par she has pain on only in the knees - worst at night and starts in the afternoon. \par she doesn't have swelling in the knee \par occasionally with chest pressure - has seen cardiology and found to be msk related and not cardiology related. \par

## 2023-05-17 NOTE — PHYSICAL EXAM
[General Appearance - Alert] : alert [General Appearance - In No Acute Distress] : in no acute distress [General Appearance - Well Developed] : well developed [General Appearance - Well Nourished] : well nourished [General Appearance - Well-Appearing] : healthy appearing [Sclera] : the sclera and conjunctiva were normal [Extraocular Movements] : extraocular movements were intact [Skin Color & Pigmentation] : normal skin color and pigmentation [Skin Turgor] : normal skin turgor [] : no rash [Sensation] : the sensory exam was normal to light touch and pinprick [Motor Exam] : the motor exam was normal [No Focal Deficits] : no focal deficits [Oriented To Time, Place, And Person] : oriented to person, place, and time [Impaired Insight] : insight and judgment were intact [Affect] : the affect was normal [FreeTextEntry1] :  chronic skin changes.

## 2023-05-17 NOTE — ASSESSMENT
[FreeTextEntry1] : JESSA PARK is a 68 year  old female, seen on today  for\par \par #  RA:  Diagnosed in the early 1990's.  significant disease damage burden, pulm fibrosis \par + nodules (chronic) \par + deformities\par Goal of treatment is RA low disease activity\par significantly  better with Rituxan  and will re-dose rituxan now that 1 month post operative\par off steroids \par Risks of, benefits of and alternatives to this treatment plan discussed with patient and patient expressed understanding. \par Current RA medications require blood work Q 3 months to assess for toxicity (bone marrow toxicity, liver toxicity, kidney toxicity) \par Will check laboratory tests to look for markers of disease activity and also to assess for medication toxicity.  \par Risks of, benefits of and alternatives to this treatment plan discussed with patient and patient expressed understanding.\par Xrays due \par Qgold and Hep screening due   \par DEXA due  \par repeat rituxan in 4-2023 (will order but not to be done until after chest CT back \par \par # ILD  - note from pulm from  reviewed and PFT WNL \par [] ct chest (3-2023)  mild progressive pulm fiborosis \par [] continue current  medications for RA and monitoring for progression of ILD with pulmonary \par \par #OA \par [] tylenol prn \par \par #HCM -\par Shingrx 1 in dec 2020 and # 2 in april 2021\par \par Neuropathy\par [] multiple causes considered including compressive due to inflammatory disease but can also consider diabetes related or other metabolic cause \par [] follow up neurology \par \par # Osteoporosis seen on DEXA  and osteopenia on dexa in \par [] check vitamin D today \par [] Had reclast 3-2021 and 4/25/22\par []  will order next reclast for 4-2023  (ordered today) \par \par More than 50% of the encounter was spent counselling  JESSA PARK on differential, workup, disease course, and treatment/management.   Education was provided to JESSA PARK during this encounter. All questions and concerns were answered and addressed in detail.  JESSA PARK verbalized understanding and agreed to plan\par \par JESSA PARK has been instructed to call for an earlier appointment if new symptoms develop \par JESSA PARK has been instructed to make a follow up appointment 12 weeks \par \par \par

## 2023-05-18 ENCOUNTER — RX RENEWAL (OUTPATIENT)
Age: 68
End: 2023-05-18

## 2023-05-26 ENCOUNTER — APPOINTMENT (OUTPATIENT)
Dept: ORTHOPEDIC SURGERY | Facility: CLINIC | Age: 68
End: 2023-05-26
Payer: MEDICARE

## 2023-05-26 PROCEDURE — 99024 POSTOP FOLLOW-UP VISIT: CPT

## 2023-05-26 NOTE — HISTORY OF PRESENT ILLNESS
[de-identified] : AP, lateral, and oblique radiographs of the right hand demonstrate acceptable alignment of her MCP joint arthroplasty. [de-identified] : 38 days postoperative. [de-identified] : 38 days status post right little finger MCP joint arthroplasty and extensor tendon realignment.  Date of surgery: 4/18/2023.\par \par She notes increased pain as of 2 weeks ago. She has not taken her rheumatological medication recently due to the surgery but notes she will be receiving her next dose on 6/14. Her pain overall is worse in the morning. She rates her pain as a 6 to 7 out of 10 at this time.  [de-identified] : Examination of her right hand after the splint was removed demonstrates her incision to be healing well.  There is decreased swelling.  Her finger is well aligned.  Again, there is some residual ulnar deviation because she does have ulnar deviation of the other digits including the ring finger, which she elected not to treat surgically.  There is no instability of the little finger MCP joint radial collateral ligament.  She has complaints of some pain along her distal ulna and ulnocarpal joint.  She has some swelling and tenderness in this region.  She is neurovascularly intact distally. [de-identified] : Stable, 38 days postoperative.  She is having some pain at the ulnocarpal joint since she has been off of her rheumatoid medications. [de-identified] : At this time, she will wean off of splint. She was instructed on continued flexion and extension exercises as well as scar massage and desensitization. She did ask me about continuing with hand therapy. I told her she can finish her remaining session if she so chooses and thereafter I am fine with her following a home exercise program. She will follow up in 3 weeks.\par \par Finally, I did tell her that her increased pain is likely secondary to her missed doses of her rheumatological medications. I told her that as far as I am concerned, she may restart her medication, which she stated she will do on 6/14.

## 2023-05-26 NOTE — HISTORY OF PRESENT ILLNESS
[de-identified] : 24 days postoperative. [de-identified] : 24 days status post right little finger MCP joint arthroplasty and extensor tendon realignment.  Date of surgery: 4/18/2023.\par \par She is doing well. [de-identified] : Examination of her right hand after the splint was removed demonstrates her incision to be healing well.  There is decreased swelling.  Her finger is well aligned.  Again, there is some residual ulnar deviation because she does have ulnar deviation of the other digits including the ring finger, which she elected not to treat surgically.  She is neurovascularly intact distally. [de-identified] : AP, lateral, and oblique radiographs of the right hand demonstrate acceptable alignment of her MCP joint arthroplasty. [de-identified] : Stable, 24 days postoperative. [de-identified] : At this time, she will continue to wear the splint and go for hand therapy consisting of gentle range of motion exercises scar massage and desensitization.  She will follow-up in 2 weeks.

## 2023-05-26 NOTE — END OF VISIT
[FreeTextEntry3] : This note was written by Rosaline Shannon on 05/26/2023 acting solely as a scribe for Dr. Glenn Stevenson.\par  \par All medical record entries made by the Scribe were at my, Dr. Glenn Stevenson, direction and personally dictated by me on 05/26/2023. I have personally reviewed the chart and agree that the record accurately reflects my personal performance of the history, physical exam, assessment and plan.

## 2023-05-26 NOTE — ADDENDUM
[FreeTextEntry1] : I, Rosaline Shannon, acted solely as a scribe for Dr. Stevenson on this date on 05/26/2023.

## 2023-05-26 NOTE — END OF VISIT
[FreeTextEntry3] : This note was written by Rosaline Shannon on 05/12/2023 acting solely as a scribe for Dr. Glenn Stevenson.\par  \par All medical record entries made by the Scribe were at my, Dr. Glenn Stevenson, direction and personally dictated by me on 05/12/2023. I have personally reviewed the chart and agree that the record accurately reflects my personal performance of the history, physical exam, assessment and plan.

## 2023-06-08 ENCOUNTER — APPOINTMENT (OUTPATIENT)
Dept: PULMONOLOGY | Facility: CLINIC | Age: 68
End: 2023-06-08

## 2023-06-09 NOTE — HISTORY OF PRESENT ILLNESS
[de-identified] : 59 days postoperative. [de-identified] : 59 days status post right little finger MCP joint arthroplasty and extensor tendon realignment.  Date of surgery: 4/18/2023.\par \par She is [de-identified] : Examination of her right hand after the splint was removed demonstrates her incision to be healing well.  There is decreased swelling.  Her finger is well aligned.  Again, there is some residual ulnar deviation because she does have ulnar deviation of the other digits including the ring finger, which she elected not to treat surgically.  There is no instability of the little finger MCP joint radial collateral ligament.  She has complaints of some pain along her distal ulna and ulnocarpal joint.  She has some swelling and tenderness in this region.  She is neurovascularly intact distally. [de-identified] : Stable, 59 days postoperative. [de-identified] : AP, lateral and oblique radiographs of her right hand demonstrate her little finger MCP joint arthroplasty to be [de-identified] : She was instructed on continued range of motion exercises and scar massage and desensitization.

## 2023-06-14 ENCOUNTER — LABORATORY RESULT (OUTPATIENT)
Age: 68
End: 2023-06-14

## 2023-06-14 ENCOUNTER — MED ADMIN CHARGE (OUTPATIENT)
Age: 68
End: 2023-06-14

## 2023-06-14 ENCOUNTER — APPOINTMENT (OUTPATIENT)
Dept: RHEUMATOLOGY | Facility: CLINIC | Age: 68
End: 2023-06-14
Payer: MEDICARE

## 2023-06-14 VITALS
OXYGEN SATURATION: 97 % | DIASTOLIC BLOOD PRESSURE: 57 MMHG | HEART RATE: 48 BPM | TEMPERATURE: 98.2 F | BODY MASS INDEX: 23.69 KG/M2 | SYSTOLIC BLOOD PRESSURE: 87 MMHG | WEIGHT: 138 LBS

## 2023-06-14 VITALS
SYSTOLIC BLOOD PRESSURE: 115 MMHG | RESPIRATION RATE: 16 BRPM | HEART RATE: 60 BPM | OXYGEN SATURATION: 96 % | DIASTOLIC BLOOD PRESSURE: 75 MMHG | TEMPERATURE: 97.3 F

## 2023-06-14 VITALS — HEART RATE: 55 BPM | DIASTOLIC BLOOD PRESSURE: 72 MMHG | SYSTOLIC BLOOD PRESSURE: 105 MMHG | OXYGEN SATURATION: 99 %

## 2023-06-14 PROCEDURE — 96413 CHEMO IV INFUSION 1 HR: CPT

## 2023-06-14 PROCEDURE — 96415 CHEMO IV INFUSION ADDL HR: CPT

## 2023-06-14 PROCEDURE — 96374 THER/PROPH/DIAG INJ IV PUSH: CPT | Mod: 59

## 2023-06-15 RX ORDER — METHYLPREDNISOLONE 125 MG/2ML
125 INJECTION, POWDER, LYOPHILIZED, FOR SOLUTION INTRAMUSCULAR; INTRAVENOUS
Qty: 0 | Refills: 0 | Status: COMPLETED
Start: 2023-03-14

## 2023-06-15 RX ORDER — CETIRIZINE HYDROCHLORIDE 10 MG/1
10 CAPSULE, LIQUID FILLED ORAL
Qty: 0 | Refills: 0 | Status: COMPLETED
Start: 2023-03-14

## 2023-06-15 RX ORDER — RITUXIMAB-ABBS 10 MG/ML
500 INJECTION, SOLUTION INTRAVENOUS
Qty: 0 | Refills: 0 | Status: COMPLETED
Start: 2023-03-21

## 2023-06-15 NOTE — HISTORY OF PRESENT ILLNESS
[5] : 5 [N/A] : N/A [Denies] : Denies [No] : No [Yes] : Yes [de-identified] : b/l hands, shoulders, knees, and back [de-identified] : dull, stiffness [Left upper extremity] : Left upper extremity [24g] : 24g [Medication Name: ___] : Medication Name: [unfilled] [Start Time: ___] : Medication Start Time: [unfilled] [End Time: ___] : Medication End Time: [unfilled] [IV discontinued. Intact. No signs or symptoms of IV complications noted. Time: ___] : IV discontinued. Intact. No signs or symptoms of IV complications noted. Time: [unfilled] [Patient  instructed to seek medical attention with signs and symptoms of adverse effects] : Patient  instructed to seek medical attention with signs and symptoms of adverse effects [Patient left unit in no acute distress] : Patient left unit in no acute distress [Medications administered as ordered and tolerated well.] : Medications administered as ordered and tolerated well. [Blood drawn at time of visit] : Blood drawn at time of visit [de-identified] : Left median cubital vein 24g [de-identified] : Patient presents for scheduled Truxima infusion, dose 1:2, in NAD. Today, patient reports pain as rated above, which she takes Tylenol and prednisone to help relieve the pain. Patient noted with swelling and deformities to bilateral hands. Patient admits to stiffness, especially in the AM and at nighttime. Patient denies any recent fever, infection, or surgery. No other symptoms or concerns were verbalized. Blood drawn as per order. Infusion titrated as per protocol. Vital signs cycled and stable throughout infusion. In conclusion, patient left the unit in [facility].

## 2023-06-16 ENCOUNTER — APPOINTMENT (OUTPATIENT)
Dept: ORTHOPEDIC SURGERY | Facility: CLINIC | Age: 68
End: 2023-06-16

## 2023-06-22 ENCOUNTER — NON-APPOINTMENT (OUTPATIENT)
Age: 68
End: 2023-06-22

## 2023-06-28 ENCOUNTER — LABORATORY RESULT (OUTPATIENT)
Age: 68
End: 2023-06-28

## 2023-06-28 ENCOUNTER — MED ADMIN CHARGE (OUTPATIENT)
Age: 68
End: 2023-06-28

## 2023-06-28 ENCOUNTER — APPOINTMENT (OUTPATIENT)
Dept: RHEUMATOLOGY | Facility: CLINIC | Age: 68
End: 2023-06-28
Payer: MEDICARE

## 2023-06-28 VITALS
HEART RATE: 60 BPM | TEMPERATURE: 97.7 F | DIASTOLIC BLOOD PRESSURE: 71 MMHG | RESPIRATION RATE: 16 BRPM | SYSTOLIC BLOOD PRESSURE: 103 MMHG | OXYGEN SATURATION: 95 %

## 2023-06-28 VITALS
SYSTOLIC BLOOD PRESSURE: 98 MMHG | TEMPERATURE: 98 F | DIASTOLIC BLOOD PRESSURE: 62 MMHG | HEART RATE: 51 BPM | OXYGEN SATURATION: 96 %

## 2023-06-28 VITALS — DIASTOLIC BLOOD PRESSURE: 64 MMHG | OXYGEN SATURATION: 97 % | HEART RATE: 58 BPM | SYSTOLIC BLOOD PRESSURE: 95 MMHG

## 2023-06-28 VITALS
TEMPERATURE: 98 F | HEART RATE: 60 BPM | SYSTOLIC BLOOD PRESSURE: 101 MMHG | DIASTOLIC BLOOD PRESSURE: 64 MMHG | OXYGEN SATURATION: 98 %

## 2023-06-28 PROCEDURE — 96413 CHEMO IV INFUSION 1 HR: CPT

## 2023-06-28 PROCEDURE — 96375 TX/PRO/DX INJ NEW DRUG ADDON: CPT | Mod: 59

## 2023-06-28 PROCEDURE — ZZZZZ: CPT

## 2023-06-28 PROCEDURE — 96415 CHEMO IV INFUSION ADDL HR: CPT

## 2023-06-28 PROCEDURE — 96374 THER/PROPH/DIAG INJ IV PUSH: CPT | Mod: 59

## 2023-06-29 RX ORDER — RITUXIMAB-ABBS 10 MG/ML
500 INJECTION, SOLUTION INTRAVENOUS
Qty: 0 | Refills: 0 | Status: COMPLETED
Start: 2023-03-21

## 2023-06-29 RX ORDER — CETIRIZINE HYDROCHLORIDE 10 MG/1
10 CAPSULE, LIQUID FILLED ORAL
Qty: 0 | Refills: 0 | Status: COMPLETED
Start: 2023-03-14

## 2023-06-29 RX ORDER — METHYLPREDNISOLONE 125 MG/2ML
125 INJECTION, POWDER, LYOPHILIZED, FOR SOLUTION INTRAMUSCULAR; INTRAVENOUS
Qty: 0 | Refills: 0 | Status: COMPLETED
Start: 2023-03-14

## 2023-06-29 RX ORDER — ZOLEDRONIC ACID 5 MG/100ML
5 INJECTION INTRAVENOUS
Qty: 0 | Refills: 0 | Status: COMPLETED | OUTPATIENT
Start: 2023-03-13

## 2023-07-11 NOTE — HISTORY OF PRESENT ILLNESS
[5] : 5 [N/A] : N/A [Denies] : Denies [No] : No [Yes] : Yes [Left upper extremity] : Left upper extremity [24g] : 24g [Medication Name: ___] : Medication Name: [unfilled] [Start Time: ___] : Medication Start Time: [unfilled] [IV discontinued. Intact. No signs or symptoms of IV complications noted. Time: ___] : IV discontinued. Intact. No signs or symptoms of IV complications noted. Time: [unfilled] [Patient  instructed to seek medical attention with signs and symptoms of adverse effects] : Patient  instructed to seek medical attention with signs and symptoms of adverse effects [Patient left unit in no acute distress] : Patient left unit in no acute distress [Medications administered as ordered and tolerated well.] : Medications administered as ordered and tolerated well. [Blood drawn at time of visit] : Blood drawn at time of visit [de-identified] : b/l hands, shoulders, knees, and back [de-identified] : dull, stiffness [End Time: ___] : End Time: [unfilled] [de-identified] : Left median cubital vein 24g [de-identified] : Patient presents for scheduled Truxima infusion, dose 2:2, in NAD. Today, patient reports pain as rated above, which she takes Tylenol and prednisone to help relieve the pain. Patient noted with swelling and deformities to bilateral hands, unchanged. Patient admits to generalized joint stiffness, especially in the AM and at nighttime. Patient denies any recent fever, infection, or surgery. No other symptoms or concerns were verbalized. Blood drawn as per order. Infusion titrated as per protocol. Vital signs cycled and stable throughout infusion. Patient received Zoledronic Acid infusion s/p Truxima infusion, tolerated well with no s/s of AE's. In conclusion, patient left the unit in [facility].

## 2023-07-31 ENCOUNTER — APPOINTMENT (OUTPATIENT)
Dept: INTERNAL MEDICINE | Facility: CLINIC | Age: 68
End: 2023-07-31

## 2023-07-31 NOTE — HISTORY OF PRESENT ILLNESS
[FreeTextEntry8] : 67 yo F with hx RA on high risk medication use-Rituxan, OA, osteoporosis, ILD, Here for URI that began. -Reports cough, nasal congestion/postnasal drip, no fever/chills NO N/V, GI symptoms CT chest 3/24/23 Mild progression of pulmonary fibrosis Non smoker -Sick contacts -Travel -COVID home testing Took COVID vaccine x2 and booster in 2021 Up to date with Pneumovax  -on Breo, Trelegy, Ventolin, Prednisine 2.5mg -2 tabs bid, Azelastine

## 2023-08-01 ENCOUNTER — OUTPATIENT (OUTPATIENT)
Dept: OUTPATIENT SERVICES | Facility: HOSPITAL | Age: 68
LOS: 1 days | End: 2023-08-01
Payer: MEDICARE

## 2023-08-01 ENCOUNTER — APPOINTMENT (OUTPATIENT)
Dept: RADIOLOGY | Facility: CLINIC | Age: 68
End: 2023-08-01

## 2023-08-01 ENCOUNTER — APPOINTMENT (OUTPATIENT)
Age: 68
End: 2023-08-01
Payer: MEDICARE

## 2023-08-01 VITALS
RESPIRATION RATE: 14 BRPM | BODY MASS INDEX: 24.03 KG/M2 | WEIGHT: 140 LBS | TEMPERATURE: 98.6 F | HEART RATE: 65 BPM | DIASTOLIC BLOOD PRESSURE: 80 MMHG | SYSTOLIC BLOOD PRESSURE: 120 MMHG | OXYGEN SATURATION: 96 %

## 2023-08-01 DIAGNOSIS — Z98.1 ARTHRODESIS STATUS: Chronic | ICD-10-CM

## 2023-08-01 DIAGNOSIS — I10 ESSENTIAL (PRIMARY) HYPERTENSION: ICD-10-CM

## 2023-08-01 DIAGNOSIS — Z98.890 OTHER SPECIFIED POSTPROCEDURAL STATES: Chronic | ICD-10-CM

## 2023-08-01 DIAGNOSIS — R06.2 WHEEZING: ICD-10-CM

## 2023-08-01 DIAGNOSIS — J06.9 ACUTE UPPER RESPIRATORY INFECTION, UNSPECIFIED: ICD-10-CM

## 2023-08-01 PROCEDURE — 99214 OFFICE O/P EST MOD 30 MIN: CPT

## 2023-08-01 PROCEDURE — 71046 X-RAY EXAM CHEST 2 VIEWS: CPT

## 2023-08-01 PROCEDURE — G0463: CPT

## 2023-08-01 PROCEDURE — 71046 X-RAY EXAM CHEST 2 VIEWS: CPT | Mod: 26

## 2023-08-01 RX ORDER — ALBUTEROL SULFATE 90 UG/1
108 (90 BASE) AEROSOL, METERED RESPIRATORY (INHALATION) EVERY 6 HOURS
Qty: 1 | Refills: 2 | Status: ACTIVE | COMMUNITY
Start: 2023-04-06 | End: 1900-01-01

## 2023-08-01 NOTE — HISTORY OF PRESENT ILLNESS
[FreeTextEntry8] : 67 yo F with hx RA (on-Rituxan), OA, osteoporosis, ILD(on multiple inhalers),  Chart reviewed today in preparation for visit.   Here for URI that began 4 days ago Reports cough, nasal congestion/postnasal drip, no fever/chills, partial loss of taste No N/V, no GI symptoms Of note: CT chest on 3/24/23 Mild progression of pulmonary fibrosis Non smoker COVID home testing - she did not check

## 2023-08-01 NOTE — ASSESSMENT
[FreeTextEntry1] : URI with cough: scattered faint wheeze on exam. otherwise non-toxic and afebrile Ag COVID test done in office at todays visit - COVID neg supportive  care - rest, fluids, tylenol if fever develops, mucinex BID sending for CXR restart daily inhaler (didnt use yet today) and albuterol PRN Patient advised to call for any worsening or if no resolution.

## 2023-08-01 NOTE — PHYSICAL EXAM
[No Acute Distress] : no acute distress [Normal Sclera/Conjunctiva] : normal sclera/conjunctiva [EOMI] : extraocular movements intact [Supple] : supple [No Respiratory Distress] : no respiratory distress  [No Accessory Muscle Use] : no accessory muscle use [Normal Rate] : normal rate  [Regular Rhythm] : with a regular rhythm [Normal Anterior Cervical Nodes] : no anterior cervical lymphadenopathy [No CVA Tenderness] : no CVA  tenderness [No Spinal Tenderness] : no spinal tenderness [No Rash] : no rash [de-identified] : non-toxic appearing [de-identified] : slight erythema, no exudates [de-identified] : scattered exp wheeze, otherwise CTA. no dullness, no rales

## 2023-08-03 DIAGNOSIS — J06.9 ACUTE UPPER RESPIRATORY INFECTION, UNSPECIFIED: ICD-10-CM

## 2023-08-03 DIAGNOSIS — R06.2 WHEEZING: ICD-10-CM

## 2023-08-10 ENCOUNTER — APPOINTMENT (OUTPATIENT)
Dept: PULMONOLOGY | Facility: CLINIC | Age: 68
End: 2023-08-10

## 2023-08-15 ENCOUNTER — APPOINTMENT (OUTPATIENT)
Dept: RHEUMATOLOGY | Facility: CLINIC | Age: 68
End: 2023-08-15
Payer: MEDICARE

## 2023-08-15 VITALS
DIASTOLIC BLOOD PRESSURE: 73 MMHG | TEMPERATURE: 97.1 F | HEIGHT: 64 IN | HEART RATE: 58 BPM | OXYGEN SATURATION: 98 % | SYSTOLIC BLOOD PRESSURE: 115 MMHG | WEIGHT: 138 LBS | BODY MASS INDEX: 23.56 KG/M2 | RESPIRATION RATE: 16 BRPM

## 2023-08-15 DIAGNOSIS — M54.9 DORSALGIA, UNSPECIFIED: ICD-10-CM

## 2023-08-15 PROCEDURE — 99215 OFFICE O/P EST HI 40 MIN: CPT

## 2023-08-15 NOTE — REASON FOR VISIT
[Follow-Up: _____] : a [unfilled] follow-up visit [FreeTextEntry1] : RA, high risk medication use, OA, OP, ILD

## 2023-08-15 NOTE — HISTORY OF PRESENT ILLNESS
[CCP] : Cyclic citrullinated peptide (CCP) antibody [Erosions] : erosions [Interstitial Lung Disease] : interstitial lung disease [Joint Pain] : joint pain [FreeTextEntry1] : JESSA PARK is a 68 year  old female, seen on today  for  RA Rituxan on6-14-23 and 6-28-23.  + fatigue  (sleeps 9 hours a night, denies snoring) has some joint pain  recently with bronchitis with alot of coughing with productive cough and this is above baseline coughing.  has pain in the left lateral back/chest wall for 3 weeks   no dysuria   OA - at baseline   OP  dexa in  with openia and low frax but DEXA from  with OSTEOPOROSIS   neck pain - no c1/c2 disease on mri cervical spine (1-2023)   [Joint Swelling] : no joint swelling [Rash] : no rash [Shortness of Breath] : no shortness of breath [Ocular Symptoms] : no ocular symptoms [Dysphonia] : no dysphonia [Morning Stiffness] : no morning stiffness [Chest Pain] : no chest pain [Fatigue] : no fatigue [TextBox_2] : 1990's [TextBox_38] : MTX [TextBox_42] : humira, enbrel, remicade (2013-208), simponi (2018 x 2 doses), Xeljanz (6-2018-> 4/2022);  Orencia (4-2022 -> 8-2022) [TextBox_44] : Rituxan (8/29 and 9/12) [TextBox_70] : feels much better since the rituxan  joint pain is alot better

## 2023-08-15 NOTE — DATA REVIEWED
[FreeTextEntry1] : Laboratory and radiology studies that were personally reviewed at today's visit are summarized below and above:\par  dexa (11-21-22):  Openia (frax 6.8 and 0.9)\par  cervical spine mri (1-): c3-c4 central disc herniation, c5-c6 uncovertebral hypertrophy and c6-c7 disc bulge.  C1/C2 intact\par  neurology note -6-2022 - concern for entrapment - needs ncv\par  cardiology note reviewed from 10-20-21 and significant CAD \par  Orthopedics notes from 5-2021 reviewed - s/p fracture with good healing \par  CT chest :  Pulm fibrosis with no progression since imaging 4-2019\par  Cspine XR :  no c1/c2 disease \par  DEXA : osteoporosis (spine=-2.5, FN=-2.7, Th = -1.6)\par  \par  Pulm appt note from 9-2019 reviewed and PFT 9-4-2019 same as 3-2019.  \par  CT CHEST (4-4-2019) Increasing peripheral parenchymal/subpleural fibrosis and honeycombing of lung. \par  Increased traction bronchiectasis. \par  No consolidating infiltrate nor developing parenchymal mass. \par  No evidence of bowel related inflammation nor obstruction.\par  \par  CXR (6-2018) WNL\par  \par  DEXA (8-28-18)  FRAX 5.8% and 0.6%\par  \par  \par  MRI cspine (2018)  Mild multilevel spondylosis of the cervical spine, as described above. The overall appearance of the cervical  spine is similar to MRI of 2013.\par  \par  EXAM:  CT HEART CALCIUM SCORE                         PROCEDURE DATE:  11/27/2018       INTERPRETATION:  Indication:  Coronary artery disease  History:  Ms. Pavon is a 63-year-old woman with rheumatoid arthritis and  dyslipidemia who reports chest pain.  Acquisitions: Non-contrast prospectively-gated 320-multidtector volumetric computed  tomography heart  Pre-medications: Metoprolol 10 mg intravenous  Quality:  Good  Post-processing:  Images analyzed with [employer] software.   FINDINGS:  Cardiovascular:  Coronary arteries:  Coronary artery calcium Agatston score:   Left main (LM) coronary artery:  0 Left anterior descending (LAD) coronary artery:  371 Left circumflex (LCX) coronary artery:  47 Right coronary artery (RCA):  0 Total:  418  Aorta: Minimal calcification of the aortic root noted.    Pericardium: There is no pericardial effusion.    Chambers: The cardiac chambers are qualitatively normal in size.  Non-cardiac: Bilateral reticular opacities are noted with a slightly  upper lobe predominance. There is peripheral honeycombing as well as mild  traction bronchiectasis. No significant consolidative or nodular  component is noted. In a patient of this age group, this is likely  related to collagen vascular disease. Unremarkable   IMPRESSION:   1.  Severe coronary artery calcium (Agatston score 418) as delineated  above.  The observed calcium score is at percentile 98 for individuals of  the same age, gender, and race/ethnicity who are free of clinical  cardiovascular disease and treated diabetes.

## 2023-08-15 NOTE — ASSESSMENT
[FreeTextEntry1] : JESSA PAKR is a 68 year  old female, seen on today  for  #  RA:  Diagnosed in the early 1990's.  significant disease damage burden, pulm fibrosis  + nodules (chronic)  + deformities Goal of treatment is RA low disease activity significantly  better with Rituxan  and will re-dose Rituxan approx  Q 6 months  Risks of, benefits of and alternatives to this treatment plan discussed with patient and patient expressed understanding.  Current RA medications require blood work Q 3 months to assess for toxicity (bone marrow toxicity, liver toxicity, kidney toxicity)  Will check laboratory tests to look for markers of disease activity and also to assess for medication toxicity.   Risks of, benefits of and alternatives to this treatment plan discussed with patient and patient expressed understanding. Xrays due  Qgold and Hep screening due   DEXA due     # ILD  - note from pulm from  reviewed and PFT WNL  [] ct chest (3-2023)  mild progressive pulm fiborosis  [] continue current  medications for RA and monitoring for progression of ILD with pulmonary   # bronchitis with persistent cough  [] follow up pulm  [] 8-1-2023 cxr reviewed ? need for CT chest given persistent productive cough since infection for 3 weeks.   # back/chest wall pain  [] likley muscle strain from coughing   #OA  [] tylenol prn   #HCM - Shingrx 1 in dec 2020 and # 2 in april 2021  Neuropathy [] multiple causes considered including compressive due to inflammatory disease but can also consider diabetes related or other metabolic cause  [] follow up neurology   # Osteoporosis seen on DEXA  and osteopenia on dexa in  [] check vitamin D today  [] Had reclast 3-2021 and 4/25/22 and 6-   More than 50% of the encounter was spent counselling  JESSA PARK on differential, workup, disease course, and treatment/management.   Education was provided to JESSA APRK during this encounter. All questions and concerns were answered and addressed in detail.  JESSA PARK verbalized understanding and agreed to plan  JESSA PARK has been instructed to call for an earlier appointment if new symptoms develop  JESSA PARK has been instructed to make a follow up appointment 12 weeks

## 2023-08-15 NOTE — PHYSICAL EXAM
[General Appearance - Alert] : alert [General Appearance - In No Acute Distress] : in no acute distress [General Appearance - Well Nourished] : well nourished [General Appearance - Well Developed] : well developed [General Appearance - Well-Appearing] : healthy appearing [Sclera] : the sclera and conjunctiva were normal [Extraocular Movements] : extraocular movements were intact [Skin Color & Pigmentation] : normal skin color and pigmentation [Skin Turgor] : normal skin turgor [] : no rash [Sensation] : the sensory exam was normal to light touch and pinprick [Motor Exam] : the motor exam was normal [No Focal Deficits] : no focal deficits [Oriented To Time, Place, And Person] : oriented to person, place, and time [Impaired Insight] : insight and judgment were intact [Affect] : the affect was normal [FreeTextEntry1] :  chronic skin changes.

## 2023-08-16 LAB
25(OH)D3 SERPL-MCNC: 36.3 NG/ML
ALBUMIN SERPL ELPH-MCNC: 4.3 G/DL
ALP BLD-CCNC: 70 U/L
ALT SERPL-CCNC: 12 U/L
ANION GAP SERPL CALC-SCNC: 11 MMOL/L
APPEARANCE: CLEAR
AST SERPL-CCNC: 16 U/L
BACTERIA UR CULT: NORMAL
BACTERIA: NEGATIVE /HPF
BILIRUB SERPL-MCNC: 0.5 MG/DL
BILIRUBIN URINE: NEGATIVE
BLOOD URINE: ABNORMAL
BUN SERPL-MCNC: 16 MG/DL
CALCIUM SERPL-MCNC: 9.4 MG/DL
CAST: 0 /LPF
CD16+CD56+ CELLS # BLD: 330 CELLS/UL
CD16+CD56+ CELLS NFR BLD: 22 %
CD19 CELLS NFR BLD: 0 CELLS/UL
CD3 CELLS # BLD: 1121 CELLS/UL
CD3 CELLS NFR BLD: 78 %
CD3+CD4+ CELLS # BLD: 658 CELLS/UL
CD3+CD4+ CELLS NFR BLD: 48 %
CD3+CD4+ CELLS/CD3+CD8+ CLL SPEC: 1.5 RATIO
CD3+CD8+ CELLS # SPEC: 437 CELLS/UL
CD3+CD8+ CELLS NFR BLD: 32 %
CELLS.CD3-CD19+/CELLS IN BLOOD: <1 %
CHLORIDE SERPL-SCNC: 102 MMOL/L
CO2 SERPL-SCNC: 26 MMOL/L
COLOR: YELLOW
CREAT SERPL-MCNC: 0.84 MG/DL
CRP SERPL-MCNC: <3 MG/L
EGFR: 76 ML/MIN/1.73M2
EPITHELIAL CELLS: 1 /HPF
ERYTHROCYTE [SEDIMENTATION RATE] IN BLOOD BY WESTERGREN METHOD: 16 MM/HR
GLUCOSE QUALITATIVE U: NEGATIVE MG/DL
GLUCOSE SERPL-MCNC: 113 MG/DL
IGA SER QL IEP: 520 MG/DL
IGG SER QL IEP: 1558 MG/DL
IGM SER QL IEP: 53 MG/DL
KETONES URINE: NEGATIVE MG/DL
LEUKOCYTE ESTERASE URINE: ABNORMAL
MICROSCOPIC-UA: NORMAL
NITRITE URINE: NEGATIVE
PH URINE: 6
POTASSIUM SERPL-SCNC: 3.8 MMOL/L
PROT SERPL-MCNC: 7.8 G/DL
PROTEIN URINE: NEGATIVE MG/DL
RED BLOOD CELLS URINE: 6 /HPF
SODIUM SERPL-SCNC: 140 MMOL/L
SPECIFIC GRAVITY URINE: 1.02
UROBILINOGEN URINE: 0.2 MG/DL
WHITE BLOOD CELLS URINE: 1 /HPF

## 2023-08-30 ENCOUNTER — APPOINTMENT (OUTPATIENT)
Dept: PULMONOLOGY | Facility: CLINIC | Age: 68
End: 2023-08-30
Payer: MEDICARE

## 2023-08-30 VITALS
HEART RATE: 59 BPM | SYSTOLIC BLOOD PRESSURE: 127 MMHG | HEIGHT: 62 IN | BODY MASS INDEX: 26.13 KG/M2 | TEMPERATURE: 97.2 F | OXYGEN SATURATION: 99 % | DIASTOLIC BLOOD PRESSURE: 80 MMHG | WEIGHT: 142 LBS | RESPIRATION RATE: 24 BRPM

## 2023-08-30 PROCEDURE — 99204 OFFICE O/P NEW MOD 45 MIN: CPT | Mod: 25

## 2023-08-30 NOTE — REVIEW OF SYSTEMS
[Cough] : cough [Chest Tightness] : chest tightness [Sputum] : sputum [Wheezing] : wheezing [Seasonal Allergies] : seasonal allergies [Immunocompromised] : immunocompromised [Negative] : Endocrine [TextBox_94] : See HPI

## 2023-08-30 NOTE — HISTORY OF PRESENT ILLNESS
[Never] : never [TextBox_4] : Patient is a 68 year old female Hx RA, on chronic immunosuppressant medications presents to [facility] for evaluation of cough.  Patikatelynnn reports she had cough for 6 weeks.  Cough is productive of green phlegm.  She reports wheeze.  She has been using Breo howeever symptoms persist.  She reports chills but no fever, denies hemoptysis, chest pain or systemic complaints.

## 2023-08-30 NOTE — ASSESSMENT
[FreeTextEntry1] : 68 year old female Hx RA on chronic immunosuppressant therapy, presents for evaluation productive cough 6 week  Initiate Doxycycline 100 mg BID Prednsione 40 mg x 7 days Continue Breo Ellipta daily as directed  Albuterol rescue 2 puffs if needed PFT when at baseline  Follow up 4-6 weeks with PFT

## 2023-08-30 NOTE — PHYSICAL EXAM
[No Acute Distress] : no acute distress [Normal Oropharynx] : normal oropharynx [Normal Appearance] : normal appearance [No Neck Mass] : no neck mass [Normal Rate/Rhythm] : normal rate/rhythm [Normal S1, S2] : normal s1, s2 [No Murmurs] : no murmurs [No Resp Distress] : no resp distress [Clear to Auscultation Bilaterally] : clear to auscultation bilaterally [Wheeze] : wheeze [No Abnormalities] : no abnormalities [Benign] : benign [Normal Gait] : normal gait [No Clubbing] : no clubbing [No Cyanosis] : no cyanosis [No Edema] : no edema [FROM] : FROM [Normal Color/ Pigmentation] : normal color/ pigmentation [No Focal Deficits] : no focal deficits [Oriented x3] : oriented x3 [Normal Affect] : normal affect [TextBox_105] : Rheumatoid changes metacarpel joints

## 2023-10-04 ENCOUNTER — RX RENEWAL (OUTPATIENT)
Age: 68
End: 2023-10-04

## 2023-10-04 RX ORDER — AZELASTINE HYDROCHLORIDE 137 UG/1
137 SPRAY, METERED NASAL
Qty: 1 | Refills: 1 | Status: ACTIVE | COMMUNITY
Start: 2018-06-27 | End: 1900-01-01

## 2023-10-10 ENCOUNTER — OUTPATIENT (OUTPATIENT)
Dept: OUTPATIENT SERVICES | Facility: HOSPITAL | Age: 68
LOS: 1 days | End: 2023-10-10
Payer: MEDICARE

## 2023-10-10 ENCOUNTER — APPOINTMENT (OUTPATIENT)
Age: 68
End: 2023-10-10
Payer: MEDICARE

## 2023-10-10 VITALS
DIASTOLIC BLOOD PRESSURE: 68 MMHG | TEMPERATURE: 97.9 F | HEIGHT: 62 IN | SYSTOLIC BLOOD PRESSURE: 102 MMHG | WEIGHT: 136 LBS | HEART RATE: 78 BPM | OXYGEN SATURATION: 97 % | BODY MASS INDEX: 25.03 KG/M2

## 2023-10-10 DIAGNOSIS — Z98.890 OTHER SPECIFIED POSTPROCEDURAL STATES: Chronic | ICD-10-CM

## 2023-10-10 DIAGNOSIS — I10 ESSENTIAL (PRIMARY) HYPERTENSION: ICD-10-CM

## 2023-10-10 DIAGNOSIS — D84.9 IMMUNODEFICIENCY, UNSPECIFIED: ICD-10-CM

## 2023-10-10 DIAGNOSIS — M06.9 RHEUMATOID ARTHRITIS, UNSPECIFIED: ICD-10-CM

## 2023-10-10 DIAGNOSIS — K57.90 DIVERTICULOSIS OF INTESTINE, PART UNSPECIFIED, W/OUT PERFORATION OR ABSCESS W/OUT BLEEDING: ICD-10-CM

## 2023-10-10 DIAGNOSIS — M79.7 FIBROMYALGIA: ICD-10-CM

## 2023-10-10 DIAGNOSIS — K92.1 MELENA: ICD-10-CM

## 2023-10-10 DIAGNOSIS — M06.30 RHEUMATOID NODULE, UNSPECIFIED SITE: ICD-10-CM

## 2023-10-10 DIAGNOSIS — J84.9 INTERSTITIAL PULMONARY DISEASE, UNSPECIFIED: ICD-10-CM

## 2023-10-10 DIAGNOSIS — R10.31 RIGHT LOWER QUADRANT PAIN: ICD-10-CM

## 2023-10-10 DIAGNOSIS — K57.90 DIVERTICULOSIS OF INTESTINE, PART UNSPECIFIED, WITHOUT PERFORATION OR ABSCESS WITHOUT BLEEDING: ICD-10-CM

## 2023-10-10 PROCEDURE — T1013: CPT

## 2023-10-10 PROCEDURE — G0463: CPT

## 2023-10-10 PROCEDURE — 99214 OFFICE O/P EST MOD 30 MIN: CPT | Mod: 25

## 2023-10-16 ENCOUNTER — EMERGENCY (EMERGENCY)
Facility: HOSPITAL | Age: 68
LOS: 1 days | Discharge: HOME | End: 2023-10-16
Attending: EMERGENCY MEDICINE
Payer: MEDICARE

## 2023-10-16 VITALS
OXYGEN SATURATION: 98 % | HEART RATE: 77 BPM | RESPIRATION RATE: 16 BRPM | DIASTOLIC BLOOD PRESSURE: 82 MMHG | SYSTOLIC BLOOD PRESSURE: 125 MMHG | TEMPERATURE: 98 F

## 2023-10-16 VITALS
SYSTOLIC BLOOD PRESSURE: 148 MMHG | WEIGHT: 139.99 LBS | RESPIRATION RATE: 18 BRPM | TEMPERATURE: 98 F | HEART RATE: 59 BPM | HEIGHT: 63 IN | OXYGEN SATURATION: 100 % | DIASTOLIC BLOOD PRESSURE: 88 MMHG

## 2023-10-16 DIAGNOSIS — Z98.890 OTHER SPECIFIED POSTPROCEDURAL STATES: Chronic | ICD-10-CM

## 2023-10-16 DIAGNOSIS — Z98.1 ARTHRODESIS STATUS: Chronic | ICD-10-CM

## 2023-10-16 LAB
ALBUMIN SERPL ELPH-MCNC: 4.4 G/DL — SIGNIFICANT CHANGE UP (ref 3.3–5)
ALP SERPL-CCNC: 62 U/L — SIGNIFICANT CHANGE UP (ref 40–120)
ALT FLD-CCNC: 17 U/L — SIGNIFICANT CHANGE UP (ref 10–45)
ANION GAP SERPL CALC-SCNC: 15 MMOL/L — SIGNIFICANT CHANGE UP (ref 5–17)
APPEARANCE UR: CLEAR — SIGNIFICANT CHANGE UP
AST SERPL-CCNC: 22 U/L — SIGNIFICANT CHANGE UP (ref 10–40)
BACTERIA # UR AUTO: NEGATIVE — SIGNIFICANT CHANGE UP
BASE EXCESS BLDV CALC-SCNC: 3.9 MMOL/L — HIGH (ref -2–3)
BASOPHILS # BLD AUTO: 0.05 K/UL — SIGNIFICANT CHANGE UP (ref 0–0.2)
BASOPHILS NFR BLD AUTO: 0.9 % — SIGNIFICANT CHANGE UP (ref 0–2)
BILIRUB SERPL-MCNC: 0.4 MG/DL — SIGNIFICANT CHANGE UP (ref 0.2–1.2)
BILIRUB UR-MCNC: NEGATIVE — SIGNIFICANT CHANGE UP
BUN SERPL-MCNC: 12 MG/DL — SIGNIFICANT CHANGE UP (ref 7–23)
CA-I SERPL-SCNC: 1.2 MMOL/L — SIGNIFICANT CHANGE UP (ref 1.15–1.33)
CALCIUM SERPL-MCNC: 9.5 MG/DL — SIGNIFICANT CHANGE UP (ref 8.4–10.5)
CHLORIDE BLDV-SCNC: 104 MMOL/L — SIGNIFICANT CHANGE UP (ref 96–108)
CHLORIDE SERPL-SCNC: 104 MMOL/L — SIGNIFICANT CHANGE UP (ref 96–108)
CO2 BLDV-SCNC: 33 MMOL/L — HIGH (ref 22–26)
CO2 SERPL-SCNC: 22 MMOL/L — SIGNIFICANT CHANGE UP (ref 22–31)
COLOR SPEC: COLORLESS — SIGNIFICANT CHANGE UP
CREAT SERPL-MCNC: 0.79 MG/DL — SIGNIFICANT CHANGE UP (ref 0.5–1.3)
DIFF PNL FLD: ABNORMAL
EGFR: 81 ML/MIN/1.73M2 — SIGNIFICANT CHANGE UP
EOSINOPHIL # BLD AUTO: 0.64 K/UL — HIGH (ref 0–0.5)
EOSINOPHIL NFR BLD AUTO: 11.3 % — HIGH (ref 0–6)
EPI CELLS # UR: 0 /HPF — SIGNIFICANT CHANGE UP
GAS PNL BLDV: 137 MMOL/L — SIGNIFICANT CHANGE UP (ref 136–145)
GAS PNL BLDV: SIGNIFICANT CHANGE UP
GAS PNL BLDV: SIGNIFICANT CHANGE UP
GLUCOSE BLDV-MCNC: 93 MG/DL — SIGNIFICANT CHANGE UP (ref 70–99)
GLUCOSE SERPL-MCNC: 92 MG/DL — SIGNIFICANT CHANGE UP (ref 70–99)
GLUCOSE UR QL: NEGATIVE — SIGNIFICANT CHANGE UP
HCO3 BLDV-SCNC: 31 MMOL/L — HIGH (ref 22–29)
HCT VFR BLD CALC: 45.5 % — HIGH (ref 34.5–45)
HCT VFR BLDA CALC: 40 % — SIGNIFICANT CHANGE UP (ref 34.5–46.5)
HGB BLD CALC-MCNC: 13.2 G/DL — SIGNIFICANT CHANGE UP (ref 11.7–16.1)
HGB BLD-MCNC: 14.7 G/DL — SIGNIFICANT CHANGE UP (ref 11.5–15.5)
HYALINE CASTS # UR AUTO: 0 /LPF — SIGNIFICANT CHANGE UP (ref 0–2)
IMM GRANULOCYTES NFR BLD AUTO: 0.2 % — SIGNIFICANT CHANGE UP (ref 0–0.9)
KETONES UR-MCNC: NEGATIVE — SIGNIFICANT CHANGE UP
LACTATE BLDV-MCNC: 1.2 MMOL/L — SIGNIFICANT CHANGE UP (ref 0.5–2)
LEUKOCYTE ESTERASE UR-ACNC: NEGATIVE — SIGNIFICANT CHANGE UP
LIDOCAIN IGE QN: 33 U/L — SIGNIFICANT CHANGE UP (ref 7–60)
LYMPHOCYTES # BLD AUTO: 1.66 K/UL — SIGNIFICANT CHANGE UP (ref 1–3.3)
LYMPHOCYTES # BLD AUTO: 29.4 % — SIGNIFICANT CHANGE UP (ref 13–44)
MCHC RBC-ENTMCNC: 28.9 PG — SIGNIFICANT CHANGE UP (ref 27–34)
MCHC RBC-ENTMCNC: 32.3 GM/DL — SIGNIFICANT CHANGE UP (ref 32–36)
MCV RBC AUTO: 89.6 FL — SIGNIFICANT CHANGE UP (ref 80–100)
MONOCYTES # BLD AUTO: 0.38 K/UL — SIGNIFICANT CHANGE UP (ref 0–0.9)
MONOCYTES NFR BLD AUTO: 6.7 % — SIGNIFICANT CHANGE UP (ref 2–14)
NEUTROPHILS # BLD AUTO: 2.9 K/UL — SIGNIFICANT CHANGE UP (ref 1.8–7.4)
NEUTROPHILS NFR BLD AUTO: 51.5 % — SIGNIFICANT CHANGE UP (ref 43–77)
NITRITE UR-MCNC: NEGATIVE — SIGNIFICANT CHANGE UP
NRBC # BLD: 0 /100 WBCS — SIGNIFICANT CHANGE UP (ref 0–0)
NT-PROBNP SERPL-SCNC: <36 PG/ML — SIGNIFICANT CHANGE UP (ref 0–300)
PCO2 BLDV: 56 MMHG — HIGH (ref 39–42)
PH BLDV: 7.35 — SIGNIFICANT CHANGE UP (ref 7.32–7.43)
PH UR: 6.5 — SIGNIFICANT CHANGE UP (ref 5–8)
PLATELET # BLD AUTO: 245 K/UL — SIGNIFICANT CHANGE UP (ref 150–400)
PO2 BLDV: 31 MMHG — SIGNIFICANT CHANGE UP (ref 25–45)
POTASSIUM BLDV-SCNC: 3.5 MMOL/L — SIGNIFICANT CHANGE UP (ref 3.5–5.1)
POTASSIUM SERPL-MCNC: 3.5 MMOL/L — SIGNIFICANT CHANGE UP (ref 3.5–5.3)
POTASSIUM SERPL-SCNC: 3.5 MMOL/L — SIGNIFICANT CHANGE UP (ref 3.5–5.3)
PROT SERPL-MCNC: 7.8 G/DL — SIGNIFICANT CHANGE UP (ref 6–8.3)
PROT UR-MCNC: NEGATIVE — SIGNIFICANT CHANGE UP
RAPID RVP RESULT: SIGNIFICANT CHANGE UP
RBC # BLD: 5.08 M/UL — SIGNIFICANT CHANGE UP (ref 3.8–5.2)
RBC # FLD: 12.9 % — SIGNIFICANT CHANGE UP (ref 10.3–14.5)
RBC CASTS # UR COMP ASSIST: 2 /HPF — SIGNIFICANT CHANGE UP (ref 0–4)
SAO2 % BLDV: 43.4 % — LOW (ref 67–88)
SARS-COV-2 RNA SPEC QL NAA+PROBE: SIGNIFICANT CHANGE UP
SODIUM SERPL-SCNC: 141 MMOL/L — SIGNIFICANT CHANGE UP (ref 135–145)
SP GR SPEC: 1.02 — SIGNIFICANT CHANGE UP (ref 1.01–1.02)
TROPONIN T, HIGH SENSITIVITY RESULT: 7 NG/L — SIGNIFICANT CHANGE UP (ref 0–51)
UROBILINOGEN FLD QL: NEGATIVE — SIGNIFICANT CHANGE UP
WBC # BLD: 5.64 K/UL — SIGNIFICANT CHANGE UP (ref 3.8–10.5)
WBC # FLD AUTO: 5.64 K/UL — SIGNIFICANT CHANGE UP (ref 3.8–10.5)
WBC UR QL: 0 /HPF — SIGNIFICANT CHANGE UP (ref 0–5)

## 2023-10-16 PROCEDURE — 82947 ASSAY GLUCOSE BLOOD QUANT: CPT

## 2023-10-16 PROCEDURE — 74177 CT ABD & PELVIS W/CONTRAST: CPT | Mod: MA

## 2023-10-16 PROCEDURE — 96375 TX/PRO/DX INJ NEW DRUG ADDON: CPT | Mod: XU

## 2023-10-16 PROCEDURE — 74177 CT ABD & PELVIS W/CONTRAST: CPT | Mod: 26,MA

## 2023-10-16 PROCEDURE — 99285 EMERGENCY DEPT VISIT HI MDM: CPT | Mod: 25

## 2023-10-16 PROCEDURE — 99285 EMERGENCY DEPT VISIT HI MDM: CPT

## 2023-10-16 PROCEDURE — 81001 URINALYSIS AUTO W/SCOPE: CPT

## 2023-10-16 PROCEDURE — 85025 COMPLETE CBC W/AUTO DIFF WBC: CPT

## 2023-10-16 PROCEDURE — 82330 ASSAY OF CALCIUM: CPT

## 2023-10-16 PROCEDURE — 71275 CT ANGIOGRAPHY CHEST: CPT | Mod: MA

## 2023-10-16 PROCEDURE — 71046 X-RAY EXAM CHEST 2 VIEWS: CPT

## 2023-10-16 PROCEDURE — 85018 HEMOGLOBIN: CPT

## 2023-10-16 PROCEDURE — 83880 ASSAY OF NATRIURETIC PEPTIDE: CPT

## 2023-10-16 PROCEDURE — 84132 ASSAY OF SERUM POTASSIUM: CPT

## 2023-10-16 PROCEDURE — 36415 COLL VENOUS BLD VENIPUNCTURE: CPT

## 2023-10-16 PROCEDURE — 84484 ASSAY OF TROPONIN QUANT: CPT

## 2023-10-16 PROCEDURE — 71275 CT ANGIOGRAPHY CHEST: CPT | Mod: 26,MA

## 2023-10-16 PROCEDURE — 87086 URINE CULTURE/COLONY COUNT: CPT

## 2023-10-16 PROCEDURE — 0225U NFCT DS DNA&RNA 21 SARSCOV2: CPT

## 2023-10-16 PROCEDURE — 83605 ASSAY OF LACTIC ACID: CPT

## 2023-10-16 PROCEDURE — 84295 ASSAY OF SERUM SODIUM: CPT

## 2023-10-16 PROCEDURE — 96374 THER/PROPH/DIAG INJ IV PUSH: CPT | Mod: XU

## 2023-10-16 PROCEDURE — 82803 BLOOD GASES ANY COMBINATION: CPT

## 2023-10-16 PROCEDURE — 82435 ASSAY OF BLOOD CHLORIDE: CPT

## 2023-10-16 PROCEDURE — 93005 ELECTROCARDIOGRAM TRACING: CPT

## 2023-10-16 PROCEDURE — 71046 X-RAY EXAM CHEST 2 VIEWS: CPT | Mod: 26

## 2023-10-16 PROCEDURE — 80053 COMPREHEN METABOLIC PANEL: CPT

## 2023-10-16 PROCEDURE — 83690 ASSAY OF LIPASE: CPT

## 2023-10-16 PROCEDURE — 85014 HEMATOCRIT: CPT

## 2023-10-16 RX ORDER — KETOROLAC TROMETHAMINE 30 MG/ML
15 SYRINGE (ML) INJECTION ONCE
Refills: 0 | Status: DISCONTINUED | OUTPATIENT
Start: 2023-10-16 | End: 2023-10-16

## 2023-10-16 RX ORDER — ACETAMINOPHEN 500 MG
1000 TABLET ORAL ONCE
Refills: 0 | Status: COMPLETED | OUTPATIENT
Start: 2023-10-16 | End: 2023-10-16

## 2023-10-16 RX ADMIN — Medication 15 MILLIGRAM(S): at 10:58

## 2023-10-16 RX ADMIN — Medication 400 MILLIGRAM(S): at 10:58

## 2023-10-16 NOTE — ED PROVIDER NOTE - PATIENT PORTAL LINK FT
You can access the FollowMyHealth Patient Portal offered by [facility] by registering at the following website: [email or web address]. By joining [facility]’s FollowMyHealth portal, you will also be able to view your health information using other applications (apps) compatible with our system.

## 2023-10-16 NOTE — ED ADULT NURSE NOTE - OBJECTIVE STATEMENT
68y F BIB self from home p/w RLQ abdominal pain. Pt is axo4, ambulates independently at baseline. PMH of RA. Mentions that the symptoms have been going on for quite some time now, approx 3months, has not been followed outpatient. Came into the ED due to increase in discomfort over the last week. Denies headache, dizziness, vision changes, chest pain, shortness of breath nausea, vomiting, diarrhea, fevers, chills, dysuria, hematuria, recent illness travel or fall. Does admit to urinary frequency over the last few days, denies hx of abdominal sx. Patient undressed and placed into gown, call bell in hand and side rails up with bed in lowest position for safety. blanket provided. Comfort and safety provided. 68y F BIB self from home p/w RLQ abdominal pain. Pt is axo4, ambulates independently at baseline. Primarily Marshallese speaking, but  at bedside who provides translation. PMH of RA. Mentions that the symptoms have been going on for quite some time now, approx 3months, has not been followed outpatient. Came into the ED due to increase in discomfort over the last week. Denies headache, dizziness, vision changes, chest pain, shortness of breath nausea, vomiting, diarrhea, fevers, chills, dysuria, hematuria, recent illness travel or fall. Does admit to urinary frequency over the last few days, denies hx of abdominal sx. Patient undressed and placed into gown, call bell in hand and side rails up with bed in lowest position for safety. blanket provided. Comfort and safety provided. 68y F BIB self from home p/w RLQ abdominal pain. Pt is axo4, ambulates independently at baseline. Primarily Gabonese speaking, but  at bedside who provides translation. PMH of RA. Mentions that the symptoms have been going on for quite some time now, approx 3months, has not been followed outpatient. Came into the ED due to increase in discomfort over the last week. Also mentions an increase in shortness of breath on exertion. Denies headache, dizziness, vision changes, chest pain, shortness of breath nausea, vomiting, diarrhea, fevers, chills, dysuria, hematuria, recent illness travel or fall. Does admit to urinary frequency over the last few days, denies hx of abdominal sx. Patient undressed and placed into gown, call bell in hand and side rails up with bed in lowest position for safety. blanket provided. Comfort and safety provided.

## 2023-10-16 NOTE — ED PROVIDER NOTE - ATTENDING CONTRIBUTION TO CARE
Dr. Baltazar (Attending Physician)  I performed a history and physical exam of the patient and discussed their management with the resident. I reviewed the resident's note and agree with the documented findings and plan of care. My medical decision making and observations are found above.

## 2023-10-16 NOTE — ED ADULT NURSE NOTE - CAS DISCH TRANSFER METHOD
GOAL: Pt will improve sitting/standing balance by 1 grade to assist with functional mobility in 2 weeks.
Private car

## 2023-10-16 NOTE — ED PROVIDER NOTE - CLINICAL SUMMARY MEDICAL DECISION MAKING FREE TEXT BOX
1. You presented to the emergency department for: Acute Colitis. Colitis is a condition in which the colon is inflamed. It can cause diarrhea, blood in the stool, and abdominal pain. Colitis can last a short time (be acute), or it may last a long time (become chronic). This condition may be caused by: Infections from viruses or bacteria.    2. Your evaluation in the emergency department included a physician evaluation. Your work-up did not reveal any findings indicating the need for admission to the hospital or any emergent interventions at this time.     3. It is recommended that you follow-up with your primary care provider as for a repeat evaluation, and potentially further testing and treatment.     If needed, to arrange an appointment with a primary care provider please call: [phone]    4. Please continue taking your regular medications as prescribed.     For pain you may take 400-600 mg IBUPROFEN or 500-1000mg ACETAMINOPHEN every 6-8 hours - as needed.  This is an over-the-counter medication - please read the instructions for use and warnings on the label. If you have any questions regarding its use, you may refer them to your local pharmacist.    5. PLEASE RETURN TO THE EMERGENCY DEPARTMENT IMMEDIATELY IF you develop any fevers not responding to over the counter medications, uncontrollable nausea and vomiting, an inability to tolerate eating and drinking, difficulty breathing, chest pain, a severe increase in your symptoms or pain, or any other new symptoms that concern you. DDx include appendicitis, diverticulosis, gallbladder path, kidney stones, PE, ACS. Low threshold to scan both abdomen/pelvis as pt on immunosuppressive therapy. PE very possible given new onset persistent SOB, will CTA chest. CBC, CMP, lactate, lipase, RVP, CHX. DDx include appendicitis, diverticulosis, gallbladder path, kidney stones, PE, ACS. Will CT abdomen/pelvis as pt on immunosuppressive therapy. PE possible given new onset persistent SOB, will CTA chest as well since getting CTAP. CBC, CMP, lactate, lipase, RVP, CHX,     Dr. Baltazar (Attending Physician)

## 2023-10-16 NOTE — ED ADULT NURSE NOTE - NSFALLUNIVINTERV_ED_ALL_ED
Bed/Stretcher in lowest position, wheels locked, appropriate side rails in place/Call bell, personal items and telephone in reach/Instruct patient to call for assistance before getting out of bed/chair/stretcher/Non-slip footwear applied when patient is off stretcher/[place] to call system/Physically safe environment - no spills, clutter or unnecessary equipment/Purposeful proactive rounding/Room/bathroom lighting operational, light cord in reach

## 2023-10-16 NOTE — ED PROVIDER NOTE - NSFOLLOWUPINSTRUCTIONS_ED_ALL_ED_FT
1. You presented to the emergency department for: Acute Colitis. Colitis is a condition in which the colon is inflamed. It can cause diarrhea, blood in the stool, and abdominal pain. Colitis can last a short time (be acute), or it may last a long time (become chronic). This condition may be caused by: Infections from viruses or bacteria.    2. Your evaluation in the emergency department included a physician evaluation. Your work-up did not reveal any findings indicating the need for admission to the hospital or any emergent interventions at this time.     3. It is recommended that you follow-up with your primary care provider as for a repeat evaluation, and potentially further testing and treatment.     If needed, to arrange an appointment with a primary care provider please call: [phone]    4. Please continue taking your regular medications as prescribed.     For pain you may take 400-600 mg IBUPROFEN or 500-1000mg ACETAMINOPHEN every 6-8 hours - as needed.  This is an over-the-counter medication - please read the instructions for use and warnings on the label. If you have any questions regarding its use, you may refer them to your local pharmacist.    5. PLEASE RETURN TO THE EMERGENCY DEPARTMENT IMMEDIATELY IF you develop any fevers not responding to over the counter medications, uncontrollable nausea and vomiting, an inability to tolerate eating and drinking, difficulty breathing, chest pain, a severe increase in your symptoms or pain, or any other new symptoms that concern you.

## 2023-10-16 NOTE — ED PROVIDER NOTE - PHYSICAL EXAMINATION
Gen: NAD, AAOx3, non-toxic appearing  HEENT: NCAT, normal conjunctiva, oral mucosa moist  Lung: speaking in full sentences, good aeration bilaterally, lungs CTA b/l  CV: regular rate and rhythm. cap refill <2x. peripheral pulses 2+bilaterally   Abd: soft, ND, ttp to RUQ, RLQ, suprapubic area, no CVA tenderness  MSK: no visible deformities  Neuro: No focal deficits  Skin: Intact, no rash  Psych: normal affect

## 2023-10-16 NOTE — ED PROVIDER NOTE - OBJECTIVE STATEMENT
68-year-old female with past medical history of rheumatoid arthritis and CAD presenting to the ED with complaints of abdominal pain for 2 weeks and chest pain for 3 weeks.  Patient states that the abdominal pain is in the right upper quadrant in the right lower quadrant, is sharp, comes and goes, associated with nausea but not vomiting. Pt noticed 2 episodes of dark stools over the last week, other wise stools normal. No dysuria. Patient states that she is never experienced this before, rates the pain 7 out of 10.  Patient states that she has also been having shortness of breath and chest pain while exerting herself, especially when walking upstairs for the past 3 weeks.  Patient is following with a pulmonologist for new potential diagnosis of interstitial lung disease secondary to rheumatoid arthritis. Pt denies recent fever, weight loss, chills, recent travel, sore throat, cough.

## 2023-10-17 LAB
CULTURE RESULTS: SIGNIFICANT CHANGE UP
CULTURE RESULTS: SIGNIFICANT CHANGE UP
SPECIMEN SOURCE: SIGNIFICANT CHANGE UP
SPECIMEN SOURCE: SIGNIFICANT CHANGE UP

## 2023-10-19 ENCOUNTER — APPOINTMENT (OUTPATIENT)
Dept: INTERNAL MEDICINE | Facility: CLINIC | Age: 68
End: 2023-10-19
Payer: MEDICARE

## 2023-10-19 PROCEDURE — 94729 DIFFUSING CAPACITY: CPT

## 2023-10-19 PROCEDURE — 94726 PLETHYSMOGRAPHY LUNG VOLUMES: CPT

## 2023-10-19 PROCEDURE — 94010 BREATHING CAPACITY TEST: CPT

## 2023-10-21 ENCOUNTER — APPOINTMENT (OUTPATIENT)
Dept: CT IMAGING | Facility: CLINIC | Age: 68
End: 2023-10-21

## 2023-10-27 ENCOUNTER — OUTPATIENT (OUTPATIENT)
Dept: OUTPATIENT SERVICES | Facility: HOSPITAL | Age: 68
LOS: 1 days | End: 2023-10-27
Payer: MEDICARE

## 2023-10-27 ENCOUNTER — NON-APPOINTMENT (OUTPATIENT)
Age: 68
End: 2023-10-27

## 2023-10-27 ENCOUNTER — APPOINTMENT (OUTPATIENT)
Dept: INTERNAL MEDICINE | Facility: CLINIC | Age: 68
End: 2023-10-27
Payer: MEDICARE

## 2023-10-27 VITALS
WEIGHT: 136 LBS | HEIGHT: 62 IN | HEART RATE: 67 BPM | BODY MASS INDEX: 25.03 KG/M2 | SYSTOLIC BLOOD PRESSURE: 110 MMHG | DIASTOLIC BLOOD PRESSURE: 80 MMHG | OXYGEN SATURATION: 95 %

## 2023-10-27 DIAGNOSIS — J06.9 ACUTE UPPER RESPIRATORY INFECTION, UNSPECIFIED: ICD-10-CM

## 2023-10-27 DIAGNOSIS — Z86.59 PERSONAL HISTORY OF OTHER MENTAL AND BEHAVIORAL DISORDERS: ICD-10-CM

## 2023-10-27 DIAGNOSIS — Z12.39 ENCOUNTER FOR OTHER SCREENING FOR MALIGNANT NEOPLASM OF BREAST: ICD-10-CM

## 2023-10-27 DIAGNOSIS — Z98.890 OTHER SPECIFIED POSTPROCEDURAL STATES: Chronic | ICD-10-CM

## 2023-10-27 DIAGNOSIS — I10 ESSENTIAL (PRIMARY) HYPERTENSION: ICD-10-CM

## 2023-10-27 DIAGNOSIS — K57.32 DIVERTICULITIS OF LARGE INTESTINE W/OUT PERFORATION OR ABSCESS W/OUT BLEEDING: ICD-10-CM

## 2023-10-27 DIAGNOSIS — J45.909 UNSPECIFIED ASTHMA, UNCOMPLICATED: ICD-10-CM

## 2023-10-27 DIAGNOSIS — E55.9 VITAMIN D DEFICIENCY, UNSPECIFIED: ICD-10-CM

## 2023-10-27 DIAGNOSIS — Z12.4 ENCOUNTER FOR SCREENING FOR MALIGNANT NEOPLASM OF CERVIX: ICD-10-CM

## 2023-10-27 DIAGNOSIS — Z00.00 ENCOUNTER FOR GENERAL ADULT MEDICAL EXAMINATION W/OUT ABNORMAL FINDINGS: ICD-10-CM

## 2023-10-27 PROCEDURE — G0439: CPT

## 2023-10-27 PROCEDURE — 99214 OFFICE O/P EST MOD 30 MIN: CPT

## 2023-10-27 PROCEDURE — 99397 PER PM REEVAL EST PAT 65+ YR: CPT | Mod: GY

## 2023-10-27 NOTE — ASU PATIENT PROFILE, ADULT - NSSUBSTANCEUSE_GEN_ALL_CORE_SD
"10/27/2023       RE: David Wilcox  [address] [facility] [address]     Dear Colleague,    Thank you for referring your patient, David Wilcox, to the [facility] [place] at [facility]. Please see a copy of my visit note below.    Video-Visit Details    Type of service:  Video Visit    Video Start Time: 2:00 PM   Video End Time: 2:25 PM     Originating Location (pt. Location): Home    Distant Location (provider location): Offsite (providers home) [facility] [place]     Platform used for Video Visit: Flixwagon    New Weight Management Nutrition Consultation    David Wilcox is a 52 year old male presents today for new weight management nutrition consultation.  Patient referred by Dr. Hood on October 27, 2023.    Patient with Co-morbidities of obesity including:      10/26/2023     1:45 PM   --   I have the following health issues associated with obesity Pre-Diabetes    High Blood Pressure    Sleep Apnea   I have the following symptoms associated with obesity Knee Pain    Depression    Groin Rash     Anthropometrics:  Initial Consult Weight: 271 lb on 10/27/23   Estimated body mass index is 41.25 kg/m  as calculated from the following:    Height as of 8/14/23: 1.727 m (5' 8\").    Weight as of an earlier encounter on 10/27/23: 123.1 kg (271 lb 4.8 oz).    Medications for Weight Loss:  Wegovy - if approved by insurance and kind find inventory of it.   Phentermine as back up plan.     NUTRITION HISTORY  Food allergies: Artificial Sweeteners - vomiting/diarrhea.   Food intolerances: None   Vitamin/Mineral Supplements: Mens MVI   Previous methods of diet modification for weight loss: Exercise, diet   RD before: None     Wife does the cooking.   Protein: likes all kinds of animal proteins.   Banana/2 mandarins everyday. Serving of vegetables with dinner.     Diet recall:   Gets up at 6 AM and has a " bowl of frosted mini wheats with milk   10:30 or 11 am - yogurt or sting cheese  Noon- 1 PM, can of soup with crackers or sandwich (homemade bread with turkey/cheese)  Afternoon - Yogurt or sting cheese (opposite from the morning)  Dinner - Varies, tries to keep portions to small or medium   Evening snack - Marshmallow cereal like Nilson charms.   Hydration: drinks 2 bottles of water per cup of coffee and 12 cups of black coffee         10/26/2023     1:45 PM   Diet Recall Review with Patient   If you do eat breakfast, what types of food do you eat? Ceareal   If you do eat lunch, what types of food do you typically eat? Soup  or a sandwich   If you do eat supper, what types of food do you typically eat? Pasta, rice, ground pork, lamb   If you do snack, what types of food do you typically eat? Yogurt or crackers,  string cheese   How many glasses of juice do you drink in a typical day? 0   How many of glasses of milk do you drink in a typical day? 2   If you do drink milk, what type? Whole   How many 8oz glasses of sugar containing drinks such as Eloy-Aid/sweet tea do you drink in a day? 0   How many cans/bottles of sugar pop/soda/tea/sports drinks do you drink in a day? 0   How many cans/bottles of diet pop/soda/tea or sports drink do you drink in a day? 0   How often do you have a drink of alcohol? Monthly or Less   If you do drink, how many drinks might you have in a day? 1 or 2         10/26/2023     1:45 PM   Eating Habits   Generally, my meals include foods like these bread, pasta, rice, potatoes, corn, crackers, sweet dessert, pop, or juice Almost Everyday   Generally, my meals include foods like these fried meats, brats, burgers, french fries, pizza, cheese, chips, or ice cream Less Than Weekly   Eat fast food (like McDonalds, Burger Adalberto, Taco Bell) Less Than Weekly   Eat at a buffet or sit-down restaurant Less Than Weekly   Eat most of my meals in front of the TV or computer Almost Everyday   Often skip  meals, eat at random times, have no regular eating times Less Than Weekly   Rarely sit down for a meal but snack or graze throughout A Few Times a Week   Eat extra snacks between meals A Few Times a Week   Eat most of my food at the end of the day Never   Eat in the middle of the night or wake up at night to eat Less Than Weekly   Eat extra snacks to prevent or correct low blood sugar Everyday   Eat to prevent acid reflux or stomach pain Never   Worry about not having enough food to eat Never   I eat when I am depressed Once a Week   I eat when I am stressed Once a Week   I eat when I am bored Almost Everyday   I eat when I am happy or as a reward Less Than Weekly   I feel hungry all the time even if I just have eaten A Few Times a Week   Feeling full is important to me Almost Everyday   I finish all the food on my plate even if I am already full Less Than Weekly   I can't resist eating delicious food or walk past the good food/smell Less Than Weekly   I eat/snack without noticing that I am eating Almost Everyday   I eat when I am preparing the meal Never   I eat more than usual when I see others eating Never   I have trouble not eating sweets, ice cream, cookies, or chips if they are around the house A Few Times a Week   I think about food all day Less Than Weekly   What foods, if any, do you crave? Sweets/Candy/Chocolate   Please list any other foods you crave? Cereal         10/26/2023     1:45 PM   Amount of Food   I feel out of control when eating Almost Everyday   I eat a large amount of food, like a loaf of bread, a box of cookies, a pint/quart of ice cream, all at once Weekly   I eat a large amount of food even when I am not hungry Weekly   I eat rapidly Weekly   I eat alone because I feel embarrassed and do not want others to see how much I have eaten Never   I eat until I am uncomfortably full Never   I feel bad, disgusted, or guilty after I overeat Weekly     Physical Activity:  Disabled and home full  time. 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 Broken his spine twice.         10/26/2023     1:45 PM   Activity/Exercise History   How much of a typical 12 hour day do you spend sitting? Less Than Half the Day   How much of a typical 12 hour day do you spend lying down? Less Than Half the Day   How much of a typical day do you spend walking/standing? Less Than Half the Day   How many hours (not including work) do you spend on the TV/Video Games/Computer/Tablet/Phone? 6 Hours or More   How many times a week are you active for the purpose of exercise? 6-7 Times a Week   What keeps you from being more active? Pain    Shortness of Breath   How many total minutes do you spend doing some activity for the purpose of exercising when you exercise? 15-30 Minutes     Nutrition Prescription  Recommended energy/nutrient modification.    Nutrition Diagnosis  Obesity r/t long history of positive energy balance aeb BMI >30.    Nutrition Intervention  Discussed having a good source of protein at all meals, aiming for at least 60 grams of protein each day.  Handouts provided on volumetric eating to help satiety level on fewer calories; portion control and healthy food choices (Plate Method and Volumetrics handouts). Patient demonstrates understanding. Co-developed goals to work towards.   Provided pt with list of goals and resources below via Quovo.     Expected Engagement: good  Follow-Up Plans: TBD     Nutrition Goals  1) Have a good source of protein at all meals and aim for at least 60 grams of protein daily.   2) Increase fruits and vegetables in diet. Aim to have 2 servings of fruits and 3 servings of vegetables each day.   3) Fiber goal is 25-30 grams. Start with trying to achieve 15 grams initially and build up from there with time.   4) Avoid snacking as able. If snack is needed use lean protein and/or fruit/vegetable. Examples:   - 2 cup popcorn   - 1 cup mixed berries   - 15 almonds, walnuts,  cashews   - carrot/celery sticks and 2 tbsp low-fat ranch   - 1 hard boiled egg   - Part-skim mozzarella cheese stick   - Low-fat, low-sugar greek yogurt with 1/2 cup berries   - Med apple or pear   - sliced bell peppers with 1/2 cup salsa   - 1/2 cup roasted chickpeas   - sliced cucumbers with vinegar   - 1/2 cup of cottage cheese   - beef/turkey jerky or sticks   - no bake protein balls     100 calorie sweets: Smart Sweets, Dr. Hernandez's Xylitol candy, Fiber One desserts, Fit and Active 100 calorie snack sweets at [employer]; [employer] 100 calorie pre-portioned cookies, sugar-free pudding, sugar-free jello.    The Plate Method  [email or web address]    Protein Sources   [email or web address]     Non-meat protein Ideas  Quinoa  Eggs  Dairy (Cottage cheese, low fat cheese, greek yogurt)   Nuts  Beans  Lentils  Protein pasta   Nutritional yeast  Garden of life raw meal powder  Liquid aminos  Homemade meats - a taco meat could be made with chopped cauliflower/mushroom as an example   Hummus (could do homemade if preferred)  Hemp hearts   Tofu  Seitan      Carbohydrates  [email or web address]     Mindful Eating  [email or web address]     Summary of Volumetrics Eating Plan  [email or web address]     Follow-Up: December 13.     Time spent with patient: 25 minutes.  Sadie Rodrigez, RAKESH, LD     caffeine

## 2023-10-29 PROBLEM — J45.909 ASTHMA: Status: ACTIVE | Noted: 2023-04-06

## 2023-10-29 RX ORDER — METFORMIN HYDROCHLORIDE 500 MG/1
500 TABLET, COATED ORAL
Qty: 90 | Refills: 3 | Status: ACTIVE | COMMUNITY
Start: 2022-05-21 | End: 1900-01-01

## 2023-10-29 RX ORDER — CARVEDILOL 3.12 MG/1
3.12 TABLET, FILM COATED ORAL
Qty: 180 | Refills: 3 | Status: ACTIVE | COMMUNITY
Start: 2019-07-23 | End: 1900-01-01

## 2023-10-31 ENCOUNTER — APPOINTMENT (OUTPATIENT)
Dept: INTERNAL MEDICINE | Facility: CLINIC | Age: 68
End: 2023-10-31
Payer: MEDICARE

## 2023-10-31 ENCOUNTER — APPOINTMENT (OUTPATIENT)
Dept: PULMONOLOGY | Facility: CLINIC | Age: 68
End: 2023-10-31
Payer: MEDICARE

## 2023-10-31 VITALS
WEIGHT: 137 LBS | OXYGEN SATURATION: 97 % | DIASTOLIC BLOOD PRESSURE: 70 MMHG | HEART RATE: 65 BPM | BODY MASS INDEX: 25.21 KG/M2 | SYSTOLIC BLOOD PRESSURE: 110 MMHG | HEIGHT: 62 IN | TEMPERATURE: 97.1 F

## 2023-10-31 DIAGNOSIS — D84.9 IMMUNODEFICIENCY, UNSPECIFIED: ICD-10-CM

## 2023-10-31 DIAGNOSIS — I01.1 ACUTE RHEUMATIC ENDOCARDITIS: ICD-10-CM

## 2023-10-31 DIAGNOSIS — R06.02 SHORTNESS OF BREATH: ICD-10-CM

## 2023-10-31 DIAGNOSIS — J47.9 BRONCHIECTASIS, UNCOMPLICATED: ICD-10-CM

## 2023-10-31 DIAGNOSIS — M06.30 RHEUMATOID NODULE, UNSPECIFIED SITE: ICD-10-CM

## 2023-10-31 LAB
ALBUMIN SERPL ELPH-MCNC: 4.3 G/DL
ALP BLD-CCNC: 60 U/L
ALT SERPL-CCNC: 13 U/L
ANION GAP SERPL CALC-SCNC: 11 MMOL/L
AST SERPL-CCNC: 20 U/L
BASOPHILS # BLD AUTO: 0.04 K/UL
BASOPHILS NFR BLD AUTO: 0.6 %
BILIRUB SERPL-MCNC: 0.4 MG/DL
BUN SERPL-MCNC: 17 MG/DL
CALCIUM SERPL-MCNC: 9.8 MG/DL
CHLORIDE SERPL-SCNC: 102 MMOL/L
CHOLEST SERPL-MCNC: 169 MG/DL
CO2 SERPL-SCNC: 27 MMOL/L
CREAT SERPL-MCNC: 0.85 MG/DL
EGFR: 75 ML/MIN/1.73M2
EOSINOPHIL # BLD AUTO: 0.33 K/UL
EOSINOPHIL NFR BLD AUTO: 5.2 %
ESTIMATED AVERAGE GLUCOSE: 126 MG/DL
GLUCOSE SERPL-MCNC: 92 MG/DL
HBA1C MFR BLD HPLC: 6 %
HCT VFR BLD CALC: 42.3 %
HDLC SERPL-MCNC: 77 MG/DL
HGB BLD-MCNC: 13.9 G/DL
IMM GRANULOCYTES NFR BLD AUTO: 0.3 %
LDLC SERPL CALC-MCNC: 80 MG/DL
LYMPHOCYTES # BLD AUTO: 1.52 K/UL
LYMPHOCYTES NFR BLD AUTO: 23.9 %
MAN DIFF?: NORMAL
MCHC RBC-ENTMCNC: 29.4 PG
MCHC RBC-ENTMCNC: 32.9 GM/DL
MCV RBC AUTO: 89.6 FL
MONOCYTES # BLD AUTO: 0.42 K/UL
MONOCYTES NFR BLD AUTO: 6.6 %
NEUTROPHILS # BLD AUTO: 4.02 K/UL
NEUTROPHILS NFR BLD AUTO: 63.4 %
NONHDLC SERPL-MCNC: 92 MG/DL
PLATELET # BLD AUTO: 219 K/UL
POTASSIUM SERPL-SCNC: 4.5 MMOL/L
PROT SERPL-MCNC: 7.4 G/DL
RBC # BLD: 4.72 M/UL
RBC # FLD: 13.2 %
SODIUM SERPL-SCNC: 141 MMOL/L
TRIGL SERPL-MCNC: 58 MG/DL
WBC # FLD AUTO: 6.35 K/UL

## 2023-10-31 PROCEDURE — 99214 OFFICE O/P EST MOD 30 MIN: CPT | Mod: 25

## 2023-10-31 PROCEDURE — 94618 PULMONARY STRESS TESTING: CPT

## 2023-11-01 ENCOUNTER — RX RENEWAL (OUTPATIENT)
Age: 68
End: 2023-11-01

## 2023-11-01 RX ORDER — ALBUTEROL SULFATE 90 UG/1
108 (90 BASE) INHALANT RESPIRATORY (INHALATION)
Qty: 1 | Refills: 3 | Status: ACTIVE | COMMUNITY
Start: 2023-08-30 | End: 1900-01-01

## 2023-11-06 DIAGNOSIS — J47.9 BRONCHIECTASIS, UNCOMPLICATED: ICD-10-CM

## 2023-11-06 DIAGNOSIS — Z12.4 ENCOUNTER FOR SCREENING FOR MALIGNANT NEOPLASM OF CERVIX: ICD-10-CM

## 2023-11-06 DIAGNOSIS — E55.9 VITAMIN D DEFICIENCY, UNSPECIFIED: ICD-10-CM

## 2023-11-06 DIAGNOSIS — J45.909 UNSPECIFIED ASTHMA, UNCOMPLICATED: ICD-10-CM

## 2023-11-06 DIAGNOSIS — Z12.39 ENCOUNTER FOR OTHER SCREENING FOR MALIGNANT NEOPLASM OF BREAST: ICD-10-CM

## 2023-11-06 DIAGNOSIS — K57.32 DIVERTICULITIS OF LARGE INTESTINE WITHOUT PERFORATION OR ABSCESS WITHOUT BLEEDING: ICD-10-CM

## 2023-11-06 DIAGNOSIS — Z00.00 ENCOUNTER FOR GENERAL ADULT MEDICAL EXAMINATION WITHOUT ABNORMAL FINDINGS: ICD-10-CM

## 2023-11-06 DIAGNOSIS — I25.10 ATHEROSCLEROTIC HEART DISEASE OF NATIVE CORONARY ARTERY WITHOUT ANGINA PECTORIS: ICD-10-CM

## 2023-11-08 ENCOUNTER — APPOINTMENT (OUTPATIENT)
Dept: RHEUMATOLOGY | Facility: CLINIC | Age: 68
End: 2023-11-08
Payer: MEDICARE

## 2023-11-08 ENCOUNTER — EMERGENCY (EMERGENCY)
Facility: HOSPITAL | Age: 68
LOS: 1 days | Discharge: HOME | End: 2023-11-08
Attending: EMERGENCY MEDICINE
Payer: MEDICARE

## 2023-11-08 VITALS
RESPIRATION RATE: 18 BRPM | TEMPERATURE: 98 F | DIASTOLIC BLOOD PRESSURE: 70 MMHG | HEART RATE: 57 BPM | OXYGEN SATURATION: 98 % | HEIGHT: 63 IN | SYSTOLIC BLOOD PRESSURE: 141 MMHG | WEIGHT: 138.01 LBS

## 2023-11-08 VITALS
HEIGHT: 62 IN | RESPIRATION RATE: 16 BRPM | BODY MASS INDEX: 25.03 KG/M2 | SYSTOLIC BLOOD PRESSURE: 116 MMHG | OXYGEN SATURATION: 98 % | TEMPERATURE: 97.1 F | DIASTOLIC BLOOD PRESSURE: 74 MMHG | WEIGHT: 136 LBS | HEART RATE: 59 BPM

## 2023-11-08 DIAGNOSIS — Z98.1 ARTHRODESIS STATUS: Chronic | ICD-10-CM

## 2023-11-08 DIAGNOSIS — Z98.890 OTHER SPECIFIED POSTPROCEDURAL STATES: Chronic | ICD-10-CM

## 2023-11-08 LAB
ALBUMIN SERPL ELPH-MCNC: 4.1 G/DL — SIGNIFICANT CHANGE UP (ref 3.3–5)
ALBUMIN SERPL ELPH-MCNC: 4.1 G/DL — SIGNIFICANT CHANGE UP (ref 3.3–5)
ALP SERPL-CCNC: 61 U/L — SIGNIFICANT CHANGE UP (ref 40–120)
ALP SERPL-CCNC: 61 U/L — SIGNIFICANT CHANGE UP (ref 40–120)
ALT FLD-CCNC: 13 U/L — SIGNIFICANT CHANGE UP (ref 10–45)
ALT FLD-CCNC: 13 U/L — SIGNIFICANT CHANGE UP (ref 10–45)
ANION GAP SERPL CALC-SCNC: 10 MMOL/L — SIGNIFICANT CHANGE UP (ref 5–17)
ANION GAP SERPL CALC-SCNC: 10 MMOL/L — SIGNIFICANT CHANGE UP (ref 5–17)
APPEARANCE UR: CLEAR — SIGNIFICANT CHANGE UP
APPEARANCE UR: CLEAR — SIGNIFICANT CHANGE UP
AST SERPL-CCNC: 18 U/L — SIGNIFICANT CHANGE UP (ref 10–40)
AST SERPL-CCNC: 18 U/L — SIGNIFICANT CHANGE UP (ref 10–40)
BACTERIA # UR AUTO: NEGATIVE /HPF — SIGNIFICANT CHANGE UP
BACTERIA # UR AUTO: NEGATIVE /HPF — SIGNIFICANT CHANGE UP
BASE EXCESS BLDV CALC-SCNC: 2.8 MMOL/L — SIGNIFICANT CHANGE UP (ref -2–3)
BASE EXCESS BLDV CALC-SCNC: 2.8 MMOL/L — SIGNIFICANT CHANGE UP (ref -2–3)
BASOPHILS # BLD AUTO: 0.03 K/UL — SIGNIFICANT CHANGE UP (ref 0–0.2)
BASOPHILS # BLD AUTO: 0.03 K/UL — SIGNIFICANT CHANGE UP (ref 0–0.2)
BASOPHILS NFR BLD AUTO: 0.4 % — SIGNIFICANT CHANGE UP (ref 0–2)
BASOPHILS NFR BLD AUTO: 0.4 % — SIGNIFICANT CHANGE UP (ref 0–2)
BILIRUB SERPL-MCNC: 0.4 MG/DL — SIGNIFICANT CHANGE UP (ref 0.2–1.2)
BILIRUB SERPL-MCNC: 0.4 MG/DL — SIGNIFICANT CHANGE UP (ref 0.2–1.2)
BILIRUB UR-MCNC: NEGATIVE — SIGNIFICANT CHANGE UP
BILIRUB UR-MCNC: NEGATIVE — SIGNIFICANT CHANGE UP
BUN SERPL-MCNC: 13 MG/DL — SIGNIFICANT CHANGE UP (ref 7–23)
BUN SERPL-MCNC: 13 MG/DL — SIGNIFICANT CHANGE UP (ref 7–23)
CA-I SERPL-SCNC: 1.25 MMOL/L — SIGNIFICANT CHANGE UP (ref 1.15–1.33)
CA-I SERPL-SCNC: 1.25 MMOL/L — SIGNIFICANT CHANGE UP (ref 1.15–1.33)
CALCIUM SERPL-MCNC: 9.6 MG/DL — SIGNIFICANT CHANGE UP (ref 8.4–10.5)
CALCIUM SERPL-MCNC: 9.6 MG/DL — SIGNIFICANT CHANGE UP (ref 8.4–10.5)
CAST: 0 /LPF — SIGNIFICANT CHANGE UP (ref 0–4)
CAST: 0 /LPF — SIGNIFICANT CHANGE UP (ref 0–4)
CHLORIDE BLDV-SCNC: 104 MMOL/L — SIGNIFICANT CHANGE UP (ref 96–108)
CHLORIDE BLDV-SCNC: 104 MMOL/L — SIGNIFICANT CHANGE UP (ref 96–108)
CHLORIDE SERPL-SCNC: 104 MMOL/L — SIGNIFICANT CHANGE UP (ref 96–108)
CHLORIDE SERPL-SCNC: 104 MMOL/L — SIGNIFICANT CHANGE UP (ref 96–108)
CO2 BLDV-SCNC: 31 MMOL/L — HIGH (ref 22–26)
CO2 BLDV-SCNC: 31 MMOL/L — HIGH (ref 22–26)
CO2 SERPL-SCNC: 26 MMOL/L — SIGNIFICANT CHANGE UP (ref 22–31)
CO2 SERPL-SCNC: 26 MMOL/L — SIGNIFICANT CHANGE UP (ref 22–31)
COLOR SPEC: YELLOW — SIGNIFICANT CHANGE UP
COLOR SPEC: YELLOW — SIGNIFICANT CHANGE UP
CREAT SERPL-MCNC: 0.75 MG/DL — SIGNIFICANT CHANGE UP (ref 0.5–1.3)
CREAT SERPL-MCNC: 0.75 MG/DL — SIGNIFICANT CHANGE UP (ref 0.5–1.3)
D DIMER BLD IA.RAPID-MCNC: 264 NG/ML DDU — HIGH
D DIMER BLD IA.RAPID-MCNC: 264 NG/ML DDU — HIGH
DIFF PNL FLD: ABNORMAL
DIFF PNL FLD: ABNORMAL
EGFR: 87 ML/MIN/1.73M2 — SIGNIFICANT CHANGE UP
EGFR: 87 ML/MIN/1.73M2 — SIGNIFICANT CHANGE UP
EOSINOPHIL # BLD AUTO: 0.57 K/UL — HIGH (ref 0–0.5)
EOSINOPHIL # BLD AUTO: 0.57 K/UL — HIGH (ref 0–0.5)
EOSINOPHIL NFR BLD AUTO: 7.9 % — HIGH (ref 0–6)
EOSINOPHIL NFR BLD AUTO: 7.9 % — HIGH (ref 0–6)
GAS PNL BLDV: 139 MMOL/L — SIGNIFICANT CHANGE UP (ref 136–145)
GAS PNL BLDV: 139 MMOL/L — SIGNIFICANT CHANGE UP (ref 136–145)
GAS PNL BLDV: SIGNIFICANT CHANGE UP
GLUCOSE BLDV-MCNC: 120 MG/DL — HIGH (ref 70–99)
GLUCOSE BLDV-MCNC: 120 MG/DL — HIGH (ref 70–99)
GLUCOSE SERPL-MCNC: 117 MG/DL — HIGH (ref 70–99)
GLUCOSE SERPL-MCNC: 117 MG/DL — HIGH (ref 70–99)
GLUCOSE UR QL: NEGATIVE MG/DL — SIGNIFICANT CHANGE UP
GLUCOSE UR QL: NEGATIVE MG/DL — SIGNIFICANT CHANGE UP
HCO3 BLDV-SCNC: 29 MMOL/L — SIGNIFICANT CHANGE UP (ref 22–29)
HCO3 BLDV-SCNC: 29 MMOL/L — SIGNIFICANT CHANGE UP (ref 22–29)
HCT VFR BLD CALC: 41.1 % — SIGNIFICANT CHANGE UP (ref 34.5–45)
HCT VFR BLD CALC: 41.1 % — SIGNIFICANT CHANGE UP (ref 34.5–45)
HCT VFR BLDA CALC: 41 % — SIGNIFICANT CHANGE UP (ref 34.5–46.5)
HCT VFR BLDA CALC: 41 % — SIGNIFICANT CHANGE UP (ref 34.5–46.5)
HGB BLD CALC-MCNC: 13.8 G/DL — SIGNIFICANT CHANGE UP (ref 11.7–16.1)
HGB BLD CALC-MCNC: 13.8 G/DL — SIGNIFICANT CHANGE UP (ref 11.7–16.1)
HGB BLD-MCNC: 13.2 G/DL — SIGNIFICANT CHANGE UP (ref 11.5–15.5)
HGB BLD-MCNC: 13.2 G/DL — SIGNIFICANT CHANGE UP (ref 11.5–15.5)
IMM GRANULOCYTES NFR BLD AUTO: 0.3 % — SIGNIFICANT CHANGE UP (ref 0–0.9)
IMM GRANULOCYTES NFR BLD AUTO: 0.3 % — SIGNIFICANT CHANGE UP (ref 0–0.9)
KETONES UR-MCNC: ABNORMAL MG/DL
KETONES UR-MCNC: ABNORMAL MG/DL
LACTATE BLDV-MCNC: 0.7 MMOL/L — SIGNIFICANT CHANGE UP (ref 0.5–2)
LACTATE BLDV-MCNC: 0.7 MMOL/L — SIGNIFICANT CHANGE UP (ref 0.5–2)
LEUKOCYTE ESTERASE UR-ACNC: NEGATIVE — SIGNIFICANT CHANGE UP
LEUKOCYTE ESTERASE UR-ACNC: NEGATIVE — SIGNIFICANT CHANGE UP
LYMPHOCYTES # BLD AUTO: 1.8 K/UL — SIGNIFICANT CHANGE UP (ref 1–3.3)
LYMPHOCYTES # BLD AUTO: 1.8 K/UL — SIGNIFICANT CHANGE UP (ref 1–3.3)
LYMPHOCYTES # BLD AUTO: 24.9 % — SIGNIFICANT CHANGE UP (ref 13–44)
LYMPHOCYTES # BLD AUTO: 24.9 % — SIGNIFICANT CHANGE UP (ref 13–44)
MCHC RBC-ENTMCNC: 29 PG — SIGNIFICANT CHANGE UP (ref 27–34)
MCHC RBC-ENTMCNC: 29 PG — SIGNIFICANT CHANGE UP (ref 27–34)
MCHC RBC-ENTMCNC: 32.1 GM/DL — SIGNIFICANT CHANGE UP (ref 32–36)
MCHC RBC-ENTMCNC: 32.1 GM/DL — SIGNIFICANT CHANGE UP (ref 32–36)
MCV RBC AUTO: 90.3 FL — SIGNIFICANT CHANGE UP (ref 80–100)
MCV RBC AUTO: 90.3 FL — SIGNIFICANT CHANGE UP (ref 80–100)
MONOCYTES # BLD AUTO: 0.5 K/UL — SIGNIFICANT CHANGE UP (ref 0–0.9)
MONOCYTES # BLD AUTO: 0.5 K/UL — SIGNIFICANT CHANGE UP (ref 0–0.9)
MONOCYTES NFR BLD AUTO: 6.9 % — SIGNIFICANT CHANGE UP (ref 2–14)
MONOCYTES NFR BLD AUTO: 6.9 % — SIGNIFICANT CHANGE UP (ref 2–14)
NEUTROPHILS # BLD AUTO: 4.32 K/UL — SIGNIFICANT CHANGE UP (ref 1.8–7.4)
NEUTROPHILS # BLD AUTO: 4.32 K/UL — SIGNIFICANT CHANGE UP (ref 1.8–7.4)
NEUTROPHILS NFR BLD AUTO: 59.6 % — SIGNIFICANT CHANGE UP (ref 43–77)
NEUTROPHILS NFR BLD AUTO: 59.6 % — SIGNIFICANT CHANGE UP (ref 43–77)
NITRITE UR-MCNC: NEGATIVE — SIGNIFICANT CHANGE UP
NITRITE UR-MCNC: NEGATIVE — SIGNIFICANT CHANGE UP
NRBC # BLD: 0 /100 WBCS — SIGNIFICANT CHANGE UP (ref 0–0)
NRBC # BLD: 0 /100 WBCS — SIGNIFICANT CHANGE UP (ref 0–0)
PCO2 BLDV: 52 MMHG — HIGH (ref 39–42)
PCO2 BLDV: 52 MMHG — HIGH (ref 39–42)
PH BLDV: 7.36 — SIGNIFICANT CHANGE UP (ref 7.32–7.43)
PH BLDV: 7.36 — SIGNIFICANT CHANGE UP (ref 7.32–7.43)
PH UR: 6 — SIGNIFICANT CHANGE UP (ref 5–8)
PH UR: 6 — SIGNIFICANT CHANGE UP (ref 5–8)
PLATELET # BLD AUTO: 212 K/UL — SIGNIFICANT CHANGE UP (ref 150–400)
PLATELET # BLD AUTO: 212 K/UL — SIGNIFICANT CHANGE UP (ref 150–400)
PO2 BLDV: 29 MMHG — SIGNIFICANT CHANGE UP (ref 25–45)
PO2 BLDV: 29 MMHG — SIGNIFICANT CHANGE UP (ref 25–45)
POTASSIUM BLDV-SCNC: 3.7 MMOL/L — SIGNIFICANT CHANGE UP (ref 3.5–5.1)
POTASSIUM BLDV-SCNC: 3.7 MMOL/L — SIGNIFICANT CHANGE UP (ref 3.5–5.1)
POTASSIUM SERPL-MCNC: 3.7 MMOL/L — SIGNIFICANT CHANGE UP (ref 3.5–5.3)
POTASSIUM SERPL-MCNC: 3.7 MMOL/L — SIGNIFICANT CHANGE UP (ref 3.5–5.3)
POTASSIUM SERPL-SCNC: 3.7 MMOL/L — SIGNIFICANT CHANGE UP (ref 3.5–5.3)
POTASSIUM SERPL-SCNC: 3.7 MMOL/L — SIGNIFICANT CHANGE UP (ref 3.5–5.3)
PROT SERPL-MCNC: 7.5 G/DL — SIGNIFICANT CHANGE UP (ref 6–8.3)
PROT SERPL-MCNC: 7.5 G/DL — SIGNIFICANT CHANGE UP (ref 6–8.3)
PROT UR-MCNC: NEGATIVE MG/DL — SIGNIFICANT CHANGE UP
PROT UR-MCNC: NEGATIVE MG/DL — SIGNIFICANT CHANGE UP
RBC # BLD: 4.55 M/UL — SIGNIFICANT CHANGE UP (ref 3.8–5.2)
RBC # BLD: 4.55 M/UL — SIGNIFICANT CHANGE UP (ref 3.8–5.2)
RBC # FLD: 13.1 % — SIGNIFICANT CHANGE UP (ref 10.3–14.5)
RBC # FLD: 13.1 % — SIGNIFICANT CHANGE UP (ref 10.3–14.5)
RBC CASTS # UR COMP ASSIST: 9 /HPF — HIGH (ref 0–4)
RBC CASTS # UR COMP ASSIST: 9 /HPF — HIGH (ref 0–4)
SAO2 % BLDV: 41.8 % — LOW (ref 67–88)
SAO2 % BLDV: 41.8 % — LOW (ref 67–88)
SODIUM SERPL-SCNC: 140 MMOL/L — SIGNIFICANT CHANGE UP (ref 135–145)
SODIUM SERPL-SCNC: 140 MMOL/L — SIGNIFICANT CHANGE UP (ref 135–145)
SP GR SPEC: >1.03 — HIGH (ref 1–1.03)
SP GR SPEC: >1.03 — HIGH (ref 1–1.03)
SQUAMOUS # UR AUTO: 1 /HPF — SIGNIFICANT CHANGE UP (ref 0–5)
SQUAMOUS # UR AUTO: 1 /HPF — SIGNIFICANT CHANGE UP (ref 0–5)
UROBILINOGEN FLD QL: 0.2 MG/DL — SIGNIFICANT CHANGE UP (ref 0.2–1)
UROBILINOGEN FLD QL: 0.2 MG/DL — SIGNIFICANT CHANGE UP (ref 0.2–1)
WBC # BLD: 7.24 K/UL — SIGNIFICANT CHANGE UP (ref 3.8–10.5)
WBC # BLD: 7.24 K/UL — SIGNIFICANT CHANGE UP (ref 3.8–10.5)
WBC # FLD AUTO: 7.24 K/UL — SIGNIFICANT CHANGE UP (ref 3.8–10.5)
WBC # FLD AUTO: 7.24 K/UL — SIGNIFICANT CHANGE UP (ref 3.8–10.5)
WBC UR QL: 3 /HPF — SIGNIFICANT CHANGE UP (ref 0–5)
WBC UR QL: 3 /HPF — SIGNIFICANT CHANGE UP (ref 0–5)

## 2023-11-08 PROCEDURE — 99285 EMERGENCY DEPT VISIT HI MDM: CPT

## 2023-11-08 PROCEDURE — 99215 OFFICE O/P EST HI 40 MIN: CPT

## 2023-11-08 PROCEDURE — 74177 CT ABD & PELVIS W/CONTRAST: CPT | Mod: 26,MA

## 2023-11-08 RX ORDER — PREDNISONE 20 MG/1
20 TABLET ORAL DAILY
Qty: 14 | Refills: 1 | Status: DISCONTINUED | COMMUNITY
Start: 2023-08-30 | End: 2023-11-08

## 2023-11-08 RX ORDER — ABATACEPT 87.5 MG/.7ML
INJECTION, SOLUTION SUBCUTANEOUS
Refills: 0 | Status: DISCONTINUED | COMMUNITY
End: 2023-11-08

## 2023-11-08 NOTE — ED PROVIDER NOTE - OBJECTIVE STATEMENT
68-year-old female past medical history of rheumatoid arthritis on Rituxan, hypertension, recent diagnosis of colitis presenting to the emergency department for 2 weeks of progressively worsening right lower quadrant pain with radiation to the suprapubic region.  Patient reports that she saw her rheumatologist who recommended she come to the ER for worsening pain.  Contrary to triage note patient endorsing that the right lower quadrant pain and it radiates to the chest.  Patient denying shortness of breath.  No palpitations, lightheadedness, near-syncope or syncope.  Patient ambulating at baseline.  Denies nausea, vomiting, diarrhea.  No vaginal discharge, bleeding or pain.  No significant weight changes recently.

## 2023-11-08 NOTE — ED PROVIDER NOTE - NS ED ATTENDING STATEMENT MOD
This was a shared visit with the OLLIE. I reviewed and verified the documentation and independently performed the documented:

## 2023-11-08 NOTE — ED PROVIDER NOTE - PHYSICAL EXAMINATION
GEN: Patient awake and alert. No acute distress, non-toxic. Well appearing.   Neck: Nontender, full ROM. No LAD.  Eyes: PERRLA b/l. EOMI, no scleral icterus, no conjunctival injection. Moist mucous membranes.  CARDIAC: RRR. Normal S1, S2. No murmur, rubs, or gallops. No peripheral edema noted.  PULM: CTA B/L no wheeze, rales or rhonchi. No signs of respiratory distress, no accessory muscle usage or nasal flaring.  ABD: Soft, TTP in RLQ, nondistended. No rebound, no involuntary guarding. BS x 4, no auscultated bruit. No HSM appreciated.  : No CVA tenderness, mild suprapubic tenderness.  MSK: Moving all extremities spontaneously. 5/5 strength and full ROM in all extremities. No obvious deformity.  NEURO: A&Ox3, no focal neurological deficits, CN 2-12 grossly intact. Following simple commands.   SKIN: Warm, dry, no rash, no lesions, no open wounds. No urticaria.

## 2023-11-08 NOTE — ED PROVIDER NOTE - NSICDXPASTMEDICALHX_GEN_ALL_CORE_FT
PAST MEDICAL HISTORY:  2019 novel coronavirus disease (COVID-19) Not admitted    Anxiety     Asthma     Chronic sinusitis     Coronary atherosclerosis     GERD (gastroesophageal reflux disease)     HLD (hyperlipidemia)     Rheumatoid arthritis

## 2023-11-08 NOTE — ED ADULT TRIAGE NOTE - CHIEF COMPLAINT QUOTE
RLQ abd pain / right flank pain. seen last month dx with colitis, finished oral abx and felt better but pain returned

## 2023-11-08 NOTE — ED PROVIDER NOTE - ATTENDING APP SHARED VISIT CONTRIBUTION OF CARE
Private Physician [facility]   [id number] Marc Rosario  68y f pmh HLD, Rheum arthritis, Laser eye surg, SP c-sec, Pt was seen few weeks ago for abd pain and treated w abx and had resolution of abd pain. Now returns for complains of abd pain abd pain rt side rad to rt kidney. Onset last night Pain mod associated w chills, w/o fever, nausea and vomiting. Pt also c/o  left chest pain rad to back off/on. Lasts 15 min. Seems assocated w laying on left side or bending over. Pain more severe than abd pain. Now almost gone. No dm, coronary artery disease, mi, ocp, recent travel, hemoptysis, shortness of breath, fever, cancer, Private Physician [facility]   [id number] Marc Rosario  68y f pmh HLD, Rheum arthritis, Laser eye surg, SP c-sec, Pt was seen few weeks ago for abd pain and treated w abx and had resolution of abd pain. Now returns for complains of abd pain abd pain rt side rad to rt kidney. Onset last night Pain mod associated w chills, w/o fever, nausea and vomiting. Pt also c/o  left chest pain rad to back off/on. Lasts 15 min. Seems assocated w laying on left side or bending over. Pain more severe than abd pain. Now almost gone. No dm, coronary artery disease, mi, ocp, recent travel, hemoptysis, shortness of breath, fever, cancer. Pe WDWN female awake alert nad normocephalic atraumatic neck supple chest clear anterior & posterior cv no rubs, gallops or murmurs min ttp left upper chest to mod palp. abd mild ttp suprapubic, no cvat, murphys. Neuro gcs 15 speech fluent power 5/5 all ext  aSeid Goode MD, Facep

## 2023-11-08 NOTE — ED PROVIDER NOTE - NSFOLLOWUPCLINICS_GEN_ALL_ED_FT
Cardiology at [facility]  Cardiology  [address] [place] [address]  Phone: [phone]  Fax:     Cardiology at [facility]  Cardiology  [address] [place] [address]  Phone: [phone]  Fax:

## 2023-11-08 NOTE — ED PROVIDER NOTE - RAPID ASSESSMENT
68-year-old female with past medical history of rheumatoid arthritis on Rituxan, hypertension presenting with abdominal pain.  Patient reports that she was seen in the ER at [facility] 3 weeks ago with right lower quadrant abdominal pain and found to have colitis.  Patient completed course of antibiotics and was feeling well.  Yesterday patient reports right lower quadrant abdominal pain returned.  Pain radiates around to her right flank.  Patient denies associated fevers, vomiting, diarrhea.  Patient spoke with her rheumatologist who instructed her to come to the ER.    **Patient was rapidly assessed by me, Walter Green PA-C. A limited history was obtained. The patient will be seen and further examined/worked up in the main ED and their care will be completed by the main ED team. Receiving team will follow up on labs, analgesia, any clinical imaging, and perform reassessment and disposition of the patient as clinically indicated. All decisions regarding the progression of care will be made at their discretion.

## 2023-11-08 NOTE — ED PROVIDER NOTE - CLINICAL SUMMARY MEDICAL DECISION MAKING FREE TEXT BOX
Adult female pmh Rheuma arthritis, HTN w recent dx colitis pw c/o abd pain rt abd rad to suprapubic, No fever and chills, hematuria. Pt also complains of several days hx of left chest pain rad to back mod waxes and wanes somewhat positional. Concerns for recurrent colitis. ct neg for same. Concerns for UTI check ua, reassess. CP c/f acs check ekg/trop/dimer/labs. cxr, Reasssess  Saeid Goode MD, Facep

## 2023-11-08 NOTE — ED PROVIDER NOTE - PATIENT PORTAL LINK FT
You can access the FollowMyHealth Patient Portal offered by [facility] by registering at the following website: [email or web address]. By joining [facility]’s FollowMyHealth portal, you will also be able to view your health information using other applications (apps) compatible with our system.

## 2023-11-08 NOTE — ED ADULT NURSE NOTE - OBJECTIVE STATEMENT
68y female A&OX4 coming in through triage complaining of abd pain. PMHX Rhumatol authorities, HLD. PT reports having flank pain for 15 days. PT states PCP gave her antibiotics for kidney stones. Pt states after the antibiotic the pain returned and decided to get checked in. Pt states abdomen is soft and non distended. Pt denies chest pain, shortness of breath, N/V/D, fever, cough. Labs was drawn and sent to lab. Pt pending dispo.

## 2023-11-08 NOTE — ED PROVIDER NOTE - PROGRESS NOTE DETAILS
Endorsed to Dr Rocio Goode MD, Facep Johnathan Heller MD PGY1: Patient reassessed, stable. Pending CTA results and repeat trop. If negative patient likely d/c home with rheumatology follow. Reports pain has improved since being in ER.

## 2023-11-08 NOTE — ED ADULT NURSE NOTE - NSFALLUNIVINTERV_ED_ALL_ED
sensation intact/responds to pain/responds to verbal commands details… Bed/Stretcher in lowest position, wheels locked, appropriate side rails in place/Call bell, personal items and telephone in reach/Instruct patient to call for assistance before getting out of bed/chair/stretcher/Non-slip footwear applied when patient is off stretcher/[place] to call system/Physically safe environment - no spills, clutter or unnecessary equipment/Purposeful proactive rounding/Room/bathroom lighting operational, light cord in reach

## 2023-11-08 NOTE — ED PROVIDER NOTE - NSPTACCESSSVCSAPPTDETAILS_ED_ALL_ED_FT
Please help establish with a cardiologist. Patient having continued pain radiating to her chest and warrants an outpatient cardiac eval. Thank you!

## 2023-11-08 NOTE — ED PROVIDER NOTE - NSFOLLOWUPINSTRUCTIONS_ED_ALL_ED_FT
You were seen in the ER for acutely worsening abdominal over the last 2 weeks. Your CT of your stomach showed no acute source for your pain. After endorsing radiation of the pain to your chest we got a CT of your chest which showed no blood clots in your lungs. Your urine was also without evidence of urinary tract infection.    Follow up with your primary physician in 1-2 days. If needed call [phone] to find a primary care physician or call  [phone] to schedule an appointment with the general medicine.    Follow up with your rheumatologist in 1-2 days.    Please follow up with a cardiologist within one week.    You may take 500-1000 mg acetaminophen every 6 hours, as needed for pain.  You may take 600 mg ibuprofen every 8 hours, with food, as needed for pain.     1. TAKE ALL MEDICATIONS AS DIRECTED.    2. FOR PAIN OR FEVER YOU CAN TAKE IBUPROFEN (MOTRIN, ADVIL) OR ACETAMINOPHEN (TYLENOL) AS NEEDED, AS DIRECTED ON PACKAGING.  3. FOLLOW UP WITH YOUR PRIMARY DOCTOR WITHIN 5 DAYS AS DIRECTED.  4. IF YOU HAD LABS OR IMAGING DONE, YOU WERE GIVEN COPIES OF ALL LABS AND/OR IMAGING RESULTS FROM YOUR ER VISIT--PLEASE TAKE THEM WITH YOU TO YOUR FOLLOW UP APPOINTMENTS.  5. RETURN TO THE ER FOR ANY WORSENING SYMPTOMS OR CONCERNS.   ----------------------------------------------------------------------------------------------------------------   Abdominal Pain, Adult  Pain in the abdomen (abdominal pain) can be caused by many things. Often, abdominal pain is not serious and it gets better with no treatment or by being treated at home. However, sometimes abdominal pain is serious.    Your health care provider will ask questions about your medical history and do a physical exam to try to determine the cause of your abdominal pain.    Follow these instructions at home:  Medicines    Take over-the-counter and prescription medicines only as told by your health care provider.  Do not take a laxative unless told by your health care provider.  General instructions      Watch your condition for any changes.  Drink enough fluid to keep your urine pale yellow.  Keep all follow-up visits as told by your health care provider. This is important.    Contact a health care provider if:  Your abdominal pain changes or gets worse.  You are not hungry or you lose weight without trying.  You are constipated or have diarrhea for more than 2–3 days.  You have pain when you urinate or have a bowel movement.  Your abdominal pain wakes you up at night.  Your pain gets worse with meals, after eating, or with certain foods.  You are vomiting and cannot keep anything down.  You have a fever.  You have blood in your urine.    Get help right away if:  Your pain does not go away as soon as your health care provider told you to expect.  You cannot stop vomiting.  Your pain is only in areas of the abdomen, such as the right side or the left lower portion of the abdomen. Pain on the right side could be caused by appendicitis.  You have bloody or black stools, or stools that look like tar.  You have severe pain, cramping, or bloating in your abdomen.  You have signs of dehydration, such as:  Dark urine, very little urine, or no urine.  Cracked lips.  Dry mouth.  Sunken eyes.  Sleepiness.  Weakness.  You have trouble breathing or chest pain.    Summary  Often, abdominal pain is not serious and it gets better with no treatment or by being treated at home. However, sometimes abdominal pain is serious.  Watch your condition for any changes.  Take over-the-counter and prescription medicines only as told by your health care provider.  Contact a health care provider if your abdominal pain changes or gets worse.  Get help right away if you have severe pain, cramping, or bloating in your abdomen.

## 2023-11-09 VITALS
DIASTOLIC BLOOD PRESSURE: 76 MMHG | TEMPERATURE: 98 F | SYSTOLIC BLOOD PRESSURE: 124 MMHG | HEART RATE: 64 BPM | RESPIRATION RATE: 18 BRPM | OXYGEN SATURATION: 100 %

## 2023-11-09 LAB
CULTURE RESULTS: SIGNIFICANT CHANGE UP
CULTURE RESULTS: SIGNIFICANT CHANGE UP
SPECIMEN SOURCE: SIGNIFICANT CHANGE UP
SPECIMEN SOURCE: SIGNIFICANT CHANGE UP
TROPONIN T, HIGH SENSITIVITY RESULT: 7 NG/L — SIGNIFICANT CHANGE UP (ref 0–51)
TROPONIN T, HIGH SENSITIVITY RESULT: 7 NG/L — SIGNIFICANT CHANGE UP (ref 0–51)

## 2023-11-09 PROCEDURE — 82330 ASSAY OF CALCIUM: CPT

## 2023-11-09 PROCEDURE — 85379 FIBRIN DEGRADATION QUANT: CPT

## 2023-11-09 PROCEDURE — 74177 CT ABD & PELVIS W/CONTRAST: CPT | Mod: MA

## 2023-11-09 PROCEDURE — 71275 CT ANGIOGRAPHY CHEST: CPT | Mod: 26,MA

## 2023-11-09 PROCEDURE — 82435 ASSAY OF BLOOD CHLORIDE: CPT

## 2023-11-09 PROCEDURE — 84295 ASSAY OF SERUM SODIUM: CPT

## 2023-11-09 PROCEDURE — 71275 CT ANGIOGRAPHY CHEST: CPT | Mod: MA

## 2023-11-09 PROCEDURE — 83605 ASSAY OF LACTIC ACID: CPT

## 2023-11-09 PROCEDURE — 84132 ASSAY OF SERUM POTASSIUM: CPT

## 2023-11-09 PROCEDURE — 81001 URINALYSIS AUTO W/SCOPE: CPT

## 2023-11-09 PROCEDURE — 82803 BLOOD GASES ANY COMBINATION: CPT

## 2023-11-09 PROCEDURE — 84484 ASSAY OF TROPONIN QUANT: CPT

## 2023-11-09 PROCEDURE — 87086 URINE CULTURE/COLONY COUNT: CPT

## 2023-11-09 PROCEDURE — 99285 EMERGENCY DEPT VISIT HI MDM: CPT | Mod: 25

## 2023-11-09 PROCEDURE — 82947 ASSAY GLUCOSE BLOOD QUANT: CPT

## 2023-11-09 PROCEDURE — 93005 ELECTROCARDIOGRAM TRACING: CPT

## 2023-11-09 PROCEDURE — 85025 COMPLETE CBC W/AUTO DIFF WBC: CPT

## 2023-11-09 PROCEDURE — 80053 COMPREHEN METABOLIC PANEL: CPT

## 2023-11-09 PROCEDURE — 85018 HEMOGLOBIN: CPT

## 2023-11-09 PROCEDURE — 85014 HEMATOCRIT: CPT

## 2023-11-09 RX ORDER — ASPIRIN/CALCIUM CARB/MAGNESIUM 324 MG
324 TABLET ORAL ONCE
Refills: 0 | Status: COMPLETED | OUTPATIENT
Start: 2023-11-09 | End: 2023-11-09

## 2023-11-09 RX ORDER — ACETAMINOPHEN 500 MG
650 TABLET ORAL ONCE
Refills: 0 | Status: COMPLETED | OUTPATIENT
Start: 2023-11-09 | End: 2023-11-09

## 2023-11-09 RX ADMIN — Medication 650 MILLIGRAM(S): at 01:41

## 2023-11-09 RX ADMIN — Medication 324 MILLIGRAM(S): at 01:41

## 2023-11-13 ENCOUNTER — APPOINTMENT (OUTPATIENT)
Dept: GASTROENTEROLOGY | Facility: CLINIC | Age: 68
End: 2023-11-13
Payer: MEDICARE

## 2023-11-13 VITALS
TEMPERATURE: 98.1 F | HEIGHT: 62 IN | WEIGHT: 135 LBS | DIASTOLIC BLOOD PRESSURE: 74 MMHG | HEART RATE: 76 BPM | OXYGEN SATURATION: 96 % | BODY MASS INDEX: 24.84 KG/M2 | SYSTOLIC BLOOD PRESSURE: 130 MMHG

## 2023-11-13 DIAGNOSIS — R10.31 RIGHT LOWER QUADRANT PAIN: ICD-10-CM

## 2023-11-13 DIAGNOSIS — K29.50 UNSPECIFIED CHRONIC GASTRITIS W/OUT BLEEDING: ICD-10-CM

## 2023-11-13 DIAGNOSIS — K59.00 CONSTIPATION, UNSPECIFIED: ICD-10-CM

## 2023-11-13 PROCEDURE — 99215 OFFICE O/P EST HI 40 MIN: CPT

## 2023-11-13 RX ORDER — POLYETHYLENE GLYCOL 3350 17 G/17G
17 POWDER, FOR SOLUTION ORAL DAILY
Qty: 30 | Refills: 3 | Status: ACTIVE | COMMUNITY
Start: 2023-11-13 | End: 1900-01-01

## 2023-11-14 ENCOUNTER — APPOINTMENT (OUTPATIENT)
Dept: THORACIC SURGERY | Facility: CLINIC | Age: 68
End: 2023-11-14
Payer: MEDICARE

## 2023-11-14 VITALS
OXYGEN SATURATION: 97 % | BODY MASS INDEX: 25.4 KG/M2 | HEART RATE: 59 BPM | RESPIRATION RATE: 16 BRPM | SYSTOLIC BLOOD PRESSURE: 119 MMHG | WEIGHT: 138 LBS | DIASTOLIC BLOOD PRESSURE: 77 MMHG | HEIGHT: 62 IN

## 2023-11-14 PROCEDURE — 99204 OFFICE O/P NEW MOD 45 MIN: CPT

## 2023-11-14 RX ORDER — PANTOPRAZOLE SODIUM 40 MG/1
40 GRANULE, DELAYED RELEASE ORAL
Refills: 0 | Status: ACTIVE | COMMUNITY

## 2023-11-15 ENCOUNTER — NON-APPOINTMENT (OUTPATIENT)
Age: 68
End: 2023-11-15

## 2023-11-22 PROBLEM — U07.1 COVID-19: Chronic | Status: ACTIVE | Noted: 2023-11-08

## 2023-11-25 ENCOUNTER — RESULT REVIEW (OUTPATIENT)
Age: 68
End: 2023-11-25

## 2023-11-25 ENCOUNTER — OUTPATIENT (OUTPATIENT)
Dept: OUTPATIENT SERVICES | Facility: HOSPITAL | Age: 68
LOS: 1 days | End: 2023-11-25
Payer: MEDICARE

## 2023-11-25 ENCOUNTER — APPOINTMENT (OUTPATIENT)
Dept: MAMMOGRAPHY | Facility: IMAGING CENTER | Age: 68
End: 2023-11-25
Payer: MEDICARE

## 2023-11-25 DIAGNOSIS — Z00.8 ENCOUNTER FOR OTHER GENERAL EXAMINATION: ICD-10-CM

## 2023-11-25 DIAGNOSIS — Z98.1 ARTHRODESIS STATUS: Chronic | ICD-10-CM

## 2023-11-25 DIAGNOSIS — Z98.890 OTHER SPECIFIED POSTPROCEDURAL STATES: Chronic | ICD-10-CM

## 2023-11-25 PROCEDURE — 77067 SCR MAMMO BI INCL CAD: CPT

## 2023-11-25 PROCEDURE — 77063 BREAST TOMOSYNTHESIS BI: CPT | Mod: 26

## 2023-11-25 PROCEDURE — 77067 SCR MAMMO BI INCL CAD: CPT | Mod: 26

## 2023-11-25 PROCEDURE — 77063 BREAST TOMOSYNTHESIS BI: CPT

## 2023-12-01 ENCOUNTER — APPOINTMENT (OUTPATIENT)
Dept: CARDIOLOGY | Facility: CLINIC | Age: 68
End: 2023-12-01

## 2023-12-21 ENCOUNTER — APPOINTMENT (OUTPATIENT)
Dept: CARDIOLOGY | Facility: CLINIC | Age: 68
End: 2023-12-21
Payer: MEDICARE

## 2023-12-21 DIAGNOSIS — R01.1 CARDIAC MURMUR, UNSPECIFIED: ICD-10-CM

## 2023-12-21 DIAGNOSIS — R07.9 CHEST PAIN, UNSPECIFIED: ICD-10-CM

## 2023-12-21 PROCEDURE — 93306 TTE W/DOPPLER COMPLETE: CPT

## 2024-01-05 ENCOUNTER — OUTPATIENT (OUTPATIENT)
Dept: OUTPATIENT SERVICES | Facility: HOSPITAL | Age: 69
LOS: 1 days | End: 2024-01-05
Payer: MEDICARE

## 2024-01-05 ENCOUNTER — APPOINTMENT (OUTPATIENT)
Dept: INTERNAL MEDICINE | Facility: CLINIC | Age: 69
End: 2024-01-05
Payer: MEDICARE

## 2024-01-05 ENCOUNTER — NON-APPOINTMENT (OUTPATIENT)
Age: 69
End: 2024-01-05

## 2024-01-05 VITALS
HEART RATE: 61 BPM | SYSTOLIC BLOOD PRESSURE: 110 MMHG | WEIGHT: 136 LBS | DIASTOLIC BLOOD PRESSURE: 80 MMHG | OXYGEN SATURATION: 97 % | BODY MASS INDEX: 25.03 KG/M2 | HEIGHT: 62 IN

## 2024-01-05 DIAGNOSIS — J84.9 INTERSTITIAL PULMONARY DISEASE, UNSPECIFIED: ICD-10-CM

## 2024-01-05 DIAGNOSIS — I25.10 ATHEROSCLEROTIC HEART DISEASE OF NATIVE CORONARY ARTERY W/OUT ANGINA PECTORIS: ICD-10-CM

## 2024-01-05 DIAGNOSIS — Z23 ENCOUNTER FOR IMMUNIZATION: ICD-10-CM

## 2024-01-05 DIAGNOSIS — I25.10 ATHEROSCLEROTIC HEART DISEASE OF NATIVE CORONARY ARTERY WITHOUT ANGINA PECTORIS: ICD-10-CM

## 2024-01-05 DIAGNOSIS — I10 ESSENTIAL (PRIMARY) HYPERTENSION: ICD-10-CM

## 2024-01-05 DIAGNOSIS — Z98.890 OTHER SPECIFIED POSTPROCEDURAL STATES: Chronic | ICD-10-CM

## 2024-01-05 DIAGNOSIS — Z79.60 LONG TERM (CURRENT) USE OF UNSPECIFIED IMMUNOMODULATORS AND IMMUNOSUPPRESSANTS: ICD-10-CM

## 2024-01-05 DIAGNOSIS — M25.552 PAIN IN LEFT HIP: ICD-10-CM

## 2024-01-05 DIAGNOSIS — J98.01 ACUTE BRONCHOSPASM: ICD-10-CM

## 2024-01-05 DIAGNOSIS — Z01.818 ENCOUNTER FOR OTHER PREPROCEDURAL EXAMINATION: ICD-10-CM

## 2024-01-05 DIAGNOSIS — Z98.1 ARTHRODESIS STATUS: Chronic | ICD-10-CM

## 2024-01-05 PROCEDURE — 93010 ELECTROCARDIOGRAM REPORT: CPT | Mod: 59

## 2024-01-05 PROCEDURE — G0463: CPT

## 2024-01-05 PROCEDURE — 99215 OFFICE O/P EST HI 40 MIN: CPT | Mod: 25

## 2024-01-05 RX ORDER — WHITE PETROLATUM 1.75 OZ
OINTMENT TOPICAL 3 TIMES DAILY
Qty: 1 | Refills: 10 | Status: COMPLETED | COMMUNITY
Start: 2023-04-26 | End: 2024-01-05

## 2024-01-05 RX ORDER — EZETIMIBE 10 MG/1
10 TABLET ORAL
Qty: 90 | Refills: 3 | Status: ACTIVE | COMMUNITY
Start: 2024-01-05 | End: 1900-01-01

## 2024-01-05 RX ORDER — GUAIFENESIN 600 MG/1
600 TABLET, EXTENDED RELEASE ORAL
Qty: 1 | Refills: 0 | Status: COMPLETED | COMMUNITY
Start: 2023-08-01 | End: 2024-01-05

## 2024-01-05 RX ORDER — POLYETHYLENE GLYCOL 3350 17 G/17G
17 POWDER, FOR SOLUTION ORAL DAILY
Qty: 30 | Refills: 2 | Status: COMPLETED | COMMUNITY
Start: 2023-03-27 | End: 2024-01-05

## 2024-01-05 RX ORDER — OMEPRAZOLE 40 MG/1
40 CAPSULE, DELAYED RELEASE ORAL TWICE DAILY
Qty: 60 | Refills: 3 | Status: COMPLETED | COMMUNITY
Start: 2023-03-27 | End: 2024-01-05

## 2024-01-05 RX ORDER — DOXYCYCLINE 100 MG/1
100 CAPSULE ORAL TWICE DAILY
Qty: 14 | Refills: 0 | Status: COMPLETED | COMMUNITY
Start: 2023-08-30 | End: 2024-01-05

## 2024-01-05 NOTE — REVIEW OF SYSTEMS
[Shortness Of Breath] : shortness of breath [Cough] : cough [Chest Pain] : no chest pain [Wheezing] : no wheezing

## 2024-01-05 NOTE — PHYSICAL EXAM
[No Acute Distress] : no acute distress [No Respiratory Distress] : no respiratory distress  [No Accessory Muscle Use] : no accessory muscle use [Clear to Auscultation] : lungs were clear to auscultation bilaterally [Normal Rate] : normal rate  [Regular Rhythm] : with a regular rhythm [Normal S1, S2] : normal S1 and S2 [No Edema] : there was no peripheral edema [Soft] : abdomen soft [Non Tender] : non-tender [de-identified] : + MELVI test  nontender L trochanteric bursa

## 2024-01-05 NOTE — HISTORY OF PRESENT ILLNESS
[Aortic Stenosis] : no aortic stenosis [Atrial Fibrillation] : no atrial fibrillation [Coronary Artery Disease] : coronary artery disease [Recent Myocardial Infarction] : no recent myocardial infarction [Smoker] : not a smoker [FreeTextEntry1] : VATS Procedure [FreeTextEntry2] : Pending [FreeTextEntry3] : Latha [FreeTextEntry4] : Katia is unclear why lung biopsy was considered  67 yo woman with long history of rheumatoid arthritis  Chronic Immunosuppression . Bronchiectasis, interstitial lung changes have worsened, mild DLCO decr no desaturations. Lung biopsy to determine medical therapy and needed if lung transplant considered. She understands and will consider this procedure. Complains of chest and back pain that she attributes to her lung disease. She has no chest pressure with exertion. Cardiothoracic Surgery Requests Cardiac Clearance before VATS CAD Risks: Chronic RA, postmenopausal  Confirmed CTA 9/1/2021 Recent ECHO 2023 RV and LV normal EF 60-65% thinning and dyskinesis of inter-atrial septum LA and RA normal size mild TR min MT min/mild MR Normal Ao  Minimal AR    Changes suggestive NonSpecific Interstitial Pneumonia vs RA related lung disease Reports her decreased quality of life 5/10 breathing. Sometimes sputum Chronic sputum  CTA  01 Sept 2021  LAD 50% narrowing   LCX 30% narrowing  RCA  normal  L Main normal  no atherosclerotic disease Aorta Reticular opacities w focal areas of honeycombing  similar to 2020  MRI C spine: No C1C2 disease

## 2024-01-05 NOTE — ASSESSMENT
[FreeTextEntry4] : 67 yo GIB diverticulosis, osteoporosis, CAD, RA, immunocompromised with advancement of ILD. OLBX indicated for treatment and future lung trasplant  1) communication with rheumatology for timing of PCV 20 and high dose flu between immunosuppressant infusion immunize patients at least four weeks before the administration of rituximab because responses to inactivated viral or bacterial vaccines may be impaired following rituximab therapy (TRUXIMA) 2) nonobstructive CAD: Dr Henry preop evaluation  LDL=80 in Oct 2023  GOAL < 70  repeat add Zetia ASA 81mg daily Surgery not scheduled at this time.  Elevated estimated PA pressure 28 (>20)  No acute changes on today's ECG

## 2024-01-09 ENCOUNTER — APPOINTMENT (OUTPATIENT)
Dept: INTERNAL MEDICINE | Facility: CLINIC | Age: 69
End: 2024-01-09
Payer: MEDICARE

## 2024-01-09 ENCOUNTER — OUTPATIENT (OUTPATIENT)
Dept: OUTPATIENT SERVICES | Facility: HOSPITAL | Age: 69
LOS: 1 days | End: 2024-01-09
Payer: MEDICARE

## 2024-01-09 VITALS — TEMPERATURE: 98.3 F

## 2024-01-09 VITALS
BODY MASS INDEX: 24.66 KG/M2 | SYSTOLIC BLOOD PRESSURE: 120 MMHG | WEIGHT: 134 LBS | OXYGEN SATURATION: 97 % | HEART RATE: 65 BPM | HEIGHT: 62 IN | DIASTOLIC BLOOD PRESSURE: 80 MMHG

## 2024-01-09 DIAGNOSIS — I25.10 ATHEROSCLEROTIC HEART DISEASE OF NATIVE CORONARY ARTERY W/OUT ANGINA PECTORIS: ICD-10-CM

## 2024-01-09 DIAGNOSIS — R30.0 DYSURIA: ICD-10-CM

## 2024-01-09 DIAGNOSIS — Z98.890 OTHER SPECIFIED POSTPROCEDURAL STATES: Chronic | ICD-10-CM

## 2024-01-09 DIAGNOSIS — Z98.1 ARTHRODESIS STATUS: Chronic | ICD-10-CM

## 2024-01-09 DIAGNOSIS — N34.3 URETHRAL SYNDROME, UNSPECIFIED: ICD-10-CM

## 2024-01-09 DIAGNOSIS — M21.941 UNSPECIFIED ACQUIRED DEFORMITY OF HAND, RIGHT HAND: ICD-10-CM

## 2024-01-09 DIAGNOSIS — I10 ESSENTIAL (PRIMARY) HYPERTENSION: ICD-10-CM

## 2024-01-09 PROCEDURE — G0463: CPT

## 2024-01-09 PROCEDURE — 99214 OFFICE O/P EST MOD 30 MIN: CPT

## 2024-01-09 NOTE — REVIEW OF SYSTEMS
[Fever] : no fever [Chills] : no chills [Dysuria] : dysuria [Incontinence] : no incontinence [Frequency] : frequency [Hematuria] : no hematuria [Vaginal Discharge] : no vaginal discharge

## 2024-01-09 NOTE — HISTORY OF PRESENT ILLNESS
[FreeTextEntry8] : 69 yo RF advancement of ILD  immunosupression long term use bronchiectasis  Sunday started dysuria  freuency small volumes postmenopausal smell strong drinking water nitrate NEG LEUK  SMALL BLOOD MODERATE no vaginitis no vaginal discharge  Began January 7

## 2024-01-09 NOTE — PHYSICAL EXAM
[No Acute Distress] : no acute distress [Well-Appearing] : well-appearing [Normal Outer Ear/Nose] : the outer ears and nose were normal in appearance [Normal Oropharynx] : the oropharynx was normal [No JVD] : no jugular venous distention [No Lymphadenopathy] : no lymphadenopathy [Supple] : supple [No Respiratory Distress] : no respiratory distress  [No Accessory Muscle Use] : no accessory muscle use [Clear to Auscultation] : lungs were clear to auscultation bilaterally [Normal Rate] : normal rate  [Regular Rhythm] : with a regular rhythm [Normal S1, S2] : normal S1 and S2 [No Edema] : there was no peripheral edema [Soft] : abdomen soft [Non Tender] : non-tender [Non-distended] : non-distended [No Masses] : no abdominal mass palpated [Normal Bowel Sounds] : normal bowel sounds [No CVA Tenderness] : no CVA  tenderness [No Rash] : no rash [Coordination Grossly Intact] : coordination grossly intact [Normal Affect] : the affect was normal

## 2024-01-09 NOTE — ASSESSMENT
[FreeTextEntry1] : 1) dysuria frequency acute so r/o UTI DDX if chronic bladder pain syndrome/ interstitial cystitis bladder pathology U/A no significant leukocytes   U/A micro + UCX nitrofurantoin

## 2024-01-10 ENCOUNTER — APPOINTMENT (OUTPATIENT)
Dept: CARDIOLOGY | Facility: CLINIC | Age: 69
End: 2024-01-10
Payer: MEDICAID

## 2024-01-10 VITALS
BODY MASS INDEX: 24.66 KG/M2 | WEIGHT: 134 LBS | HEART RATE: 67 BPM | HEIGHT: 62 IN | SYSTOLIC BLOOD PRESSURE: 133 MMHG | OXYGEN SATURATION: 98 % | DIASTOLIC BLOOD PRESSURE: 78 MMHG

## 2024-01-10 DIAGNOSIS — Z01.818 ENCOUNTER FOR OTHER PREPROCEDURAL EXAMINATION: ICD-10-CM

## 2024-01-10 LAB
APPEARANCE: CLEAR
BACTERIA: NEGATIVE /HPF
BILIRUBIN URINE: NEGATIVE
BLOOD URINE: ABNORMAL
CAST: 0 /LPF
CHOLEST SERPL-MCNC: 154 MG/DL
COLOR: YELLOW
EPITHELIAL CELLS: 1 /HPF
ESTIMATED AVERAGE GLUCOSE: 123 MG/DL
GLUCOSE QUALITATIVE U: NEGATIVE MG/DL
HBA1C MFR BLD HPLC: 5.9 %
HDLC SERPL-MCNC: 68 MG/DL
KETONES URINE: NEGATIVE MG/DL
LDLC SERPL CALC-MCNC: 72 MG/DL
LEUKOCYTE ESTERASE URINE: ABNORMAL
MICROSCOPIC-UA: NORMAL
NITRITE URINE: NEGATIVE
NONHDLC SERPL-MCNC: 87 MG/DL
PH URINE: 6
PROTEIN URINE: NEGATIVE MG/DL
RED BLOOD CELLS URINE: 1 /HPF
REVIEW: NORMAL
SPECIFIC GRAVITY URINE: 1.01
TRIGL SERPL-MCNC: 74 MG/DL
UROBILINOGEN URINE: 0.2 MG/DL
WHITE BLOOD CELLS URINE: 8 /HPF

## 2024-01-10 PROCEDURE — 99214 OFFICE O/P EST MOD 30 MIN: CPT

## 2024-01-10 NOTE — ASSESSMENT
[FreeTextEntry1] : Assessment: 1. Coronary Atherosclerosis 2. Chest pressure - some improvement with coreg 3. + Calcium score 4. Family history of CAD 5. RA 6. Anxiety.    Plan: 1. Continue medical therapy for now with: - antiplatelet - aspirin - statin Atorvastatin 80mg daily - beta blocker - coreg - agree with Zetia 10mg daily - LDL was 72 recently  2. She had a normal coronary CTA with no sig stenosis, echo was normal, ECG was normal Jan 5th he has no angina and echo in December 2023 with normal LV function  3.  She may proceed with endoscopy , colonoscopy and VATS without any additional cardiac testing.  4.  Return in 1 year

## 2024-01-10 NOTE — HISTORY OF PRESENT ILLNESS
[FreeTextEntry1] : 1/10/2024  Needs colonoscpy / EGD with Dr. Mohamud Also seeing Latha and Myron - may need VATS with biopsy   BP and HR well controlled   She is able to walk up 2 flights of stairs and is able to walk 1 hour.    Echo had an echocardiogram December 2023:  Normal LV , min AI, min MR, min PT, thinning of interatiral septum.  ECG Jan 5th - normal .    Coronary CTA in 2021 - mild to moderate CAD    4/5/2023  She was seen by ortho Needs surgery on the right hand. Dr. Stevenson.  Surgery date is april 18th. This will be done in [facility] She lives on second floor and can walk up a flight of stairs. BP and HR well controlled She always has chest pain with movement of her arms.     10/12/2022  She has point tenderness to palpation of the ant chest wall and left post chest wall no sob worse with movement of her arms BP and HR well controlled no exretional cp Echo was NORMAL in June 4/6/2022 She complains of chest pressure Back pains Tired  If she moves her arms and shoulders she has pain in the chest and the arms Hard to sleep on the left side, She has a tightness in the area Fatigue with exertion Yesterday she cleaned her bathroom and had pain in the back No leg swelling.  Exertion does not cause chest pain. She does get tired with exertion.  She needs to have a humira infusion Seen by Dr. Pacheco recently  Xeljanz is off   Medications: Famotidine Prednisone 2.5 mg daily  Meclizine  Coreg 3.125mg BID atorvastatin 80 Folic acid Aspirin 81mg daily    10/20/2021 Doing ok, no exertional chest pain or sob Muscles are aching her in the chest and the arm no leg swelling She is on aspirin and statin therapy BP is well controlled   CT heart coronary 9/1/2021 Mid LAD 50% Cx 30%  Echo:  EF 63%, mild MR, normal LV size and function, borderline pHTN  Medications: Xeljanz Famotidine Prednisone Meclizine  Coreg 3.125mg BID atorvastatin 40 Folic acid Aspirin 81mg daily

## 2024-01-11 LAB — BACTERIA UR CULT: NORMAL

## 2024-01-11 RX ORDER — NITROFURANTOIN MACROCRYSTALS 100 MG/1
100 CAPSULE ORAL
Qty: 14 | Refills: 0 | Status: COMPLETED | COMMUNITY
Start: 2024-01-09 | End: 2024-01-11

## 2024-01-17 ENCOUNTER — APPOINTMENT (OUTPATIENT)
Dept: RHEUMATOLOGY | Facility: CLINIC | Age: 69
End: 2024-01-17
Payer: MEDICARE

## 2024-01-17 VITALS
TEMPERATURE: 98.1 F | HEIGHT: 62 IN | DIASTOLIC BLOOD PRESSURE: 72 MMHG | OXYGEN SATURATION: 98 % | HEART RATE: 69 BPM | WEIGHT: 134 LBS | SYSTOLIC BLOOD PRESSURE: 116 MMHG | RESPIRATION RATE: 16 BRPM | BODY MASS INDEX: 24.66 KG/M2

## 2024-01-17 DIAGNOSIS — M81.0 AGE-RELATED OSTEOPOROSIS W/OUT CURRENT PATHOLOGICAL FRACTURE: ICD-10-CM

## 2024-01-17 PROCEDURE — G2211 COMPLEX E/M VISIT ADD ON: CPT

## 2024-01-17 PROCEDURE — 99215 OFFICE O/P EST HI 40 MIN: CPT | Mod: 25

## 2024-01-17 NOTE — REASON FOR VISIT
[Follow-Up: _____] : a [unfilled] follow-up visit [Patient Declined  Services] : - None: Patient declined  services [Time Spent: ____ minutes] : Total time spent using  services: [unfilled] minutes. The patient's primary language is not English thus required  services. [FreeTextEntry1] : RA, high risk medication use, OA, OP, ILD [Interpreters_IDNumber] : [id number] [Interpreters_FullName] : darling

## 2024-01-17 NOTE — DATA REVIEWED
[FreeTextEntry1] : Laboratory and radiology studies that were personally reviewed at today's visit are summarized below and above: cards (1-):  cont asa, zocor, coreg and zetia  dexa (11-21-22):  Openia (frax 6.8 and 0.9) cervical spine mri (1-): c3-c4 central disc herniation, c5-c6 uncovertebral hypertrophy and c6-c7 disc bulge.  C1/C2 intact neurology note -6-2022 - concern for entrapment - needs ncv cardiology note reviewed from 10-20-21 and significant CAD  Orthopedics notes from 5-2021 reviewed - s/p fracture with good healing  CT chest :  Pulm fibrosis with no progression since imaging 4-2019 Cspine XR :  no c1/c2 disease  DEXA : osteoporosis (spine=-2.5, FN=-2.7, Th = -1.6)  Pulm appt note from 9-2019 reviewed and PFT 9-4-2019 same as 3-2019.   CT CHEST (4-4-2019) Increasing peripheral parenchymal/subpleural fibrosis and honeycombing of lung.  Increased traction bronchiectasis.  No consolidating infiltrate nor developing parenchymal mass.  No evidence of bowel related inflammation nor obstruction.  CXR (6-2018) WNL  DEXA (8-28-18)  FRAX 5.8% and 0.6%   MRI cspine (2018)  Mild multilevel spondylosis of the cervical spine, as described above. The overall appearance of the cervical  spine is similar to MRI of 2013.  EXAM:  CT HEART CALCIUM SCORE                         PROCEDURE DATE:  11/27/2018       INTERPRETATION:  Indication:  Coronary artery disease  History:  Ms. Pavon is a 63-year-old woman with rheumatoid arthritis and  dyslipidemia who reports chest pain.  Acquisitions: Non-contrast prospectively-gated 320-multidtector volumetric computed  tomography heart  Pre-medications: Metoprolol 10 mg intravenous  Quality:  Good  Post-processing:  Images analyzed with [employer] software.   FINDINGS:  Cardiovascular:  Coronary arteries:  Coronary artery calcium Agatston score:   Left main (LM) coronary artery:  0 Left anterior descending (LAD) coronary artery:  371 Left circumflex (LCX) coronary artery:  47 Right coronary artery (RCA):  0 Total:  418  Aorta: Minimal calcification of the aortic root noted.    Pericardium: There is no pericardial effusion.    Chambers: The cardiac chambers are qualitatively normal in size.  Non-cardiac: Bilateral reticular opacities are noted with a slightly  upper lobe predominance. There is peripheral honeycombing as well as mild  traction bronchiectasis. No significant consolidative or nodular  component is noted. In a patient of this age group, this is likely  related to collagen vascular disease. Unremarkable   IMPRESSION:   1.  Severe coronary artery calcium (Agatston score 418) as delineated  above.  The observed calcium score is at percentile 98 for individuals of  the same age, gender, and race/ethnicity who are free of clinical  cardiovascular disease and treated diabetes.

## 2024-01-17 NOTE — PHYSICAL EXAM
[General Appearance - Alert] : alert [General Appearance - In No Acute Distress] : in no acute distress [General Appearance - Well Nourished] : well nourished [General Appearance - Well Developed] : well developed [General Appearance - Well-Appearing] : healthy appearing [Sclera] : the sclera and conjunctiva were normal [Extraocular Movements] : extraocular movements were intact [No CVA Tenderness] : no ~M costovertebral angle tenderness [Skin Color & Pigmentation] : normal skin color and pigmentation [Skin Turgor] : normal skin turgor [] : no rash [Sensation] : the sensory exam was normal to light touch and pinprick [Motor Exam] : the motor exam was normal [No Focal Deficits] : no focal deficits [Oriented To Time, Place, And Person] : oriented to person, place, and time [Impaired Insight] : insight and judgment were intact [Affect] : the affect was normal [FreeTextEntry1] :  chronic skin changes.

## 2024-01-17 NOTE — ASSESSMENT
[FreeTextEntry1] : JESSA PARK is a 68 year old female, seen on today for  # RA: Diagnosed in the early 1990's. significant disease damage burden, pulm fibrosis + nodules (chronic) + deformities Today wiht CDAI with high disease activity  Goal of treatment is RA low disease activity significantly better with Rituxan and will re-dose Rituxan approx. Q 6 months.  last rituxan was 6-28-23.  awaiting gyn appt for dysuria and lung biopsy before re-dosing rituxan (appts pending  Add prednisone 2.5mg BID as bridge while high disease activity and while awaiting above workup.  Risks of, benefits of and alternatives to this treatment plan discussed with patient and patient expressed understanding. Current RA medications require blood work Q 3 months to assess for toxicity (bone marrow toxicity, liver toxicity, kidney toxicity) Will check laboratory tests to look for markers of disease activity and also to assess for medication toxicity. Risks of, benefits of and alternatives to this treatment plan discussed with patient and patient expressed understanding. Xrays due  Qgold and Hep screening due  (ordered today)  DEXA due    # ILD - note from pulm from  reviewed and PFT WNL [] ct chest (3-2023) mild progressive pulm fiborosis [] continue current medications for RA and monitoring for progression of ILD with pulmonary [] follow up ct surgery as need biopsy results before next rituxan   #OA [] tylenol prn  #HCM - Shingrx 1 in dec 2020 and # 2 in april 2021  Neuropathy [] multiple causes considered including compressive due to inflammatory disease but can also consider diabetes related or other metabolic cause [] follow up neurology  # Osteoporosis seen on DEXA  and osteopenia on dexa in  [] check vitamin D today [] Had reclast 3-2021 and 4/25/22 and 6- [] continue calcium     More than 50% of the encounter was spent counseling JESSA PARK on the differential, workup, disease course, and treatment/management.   Education was provided to JESSA PARK during this encounter. All questions and concerns were answered and addressed in detail.  JESSA PARK verbalized understanding and agreed to the plan.   JESSA PARK has been instructed to call for an earlier appointment if new symptoms develop in the interim. JESSA PARK has been instructed to make a follow-up appointment in 3 months

## 2024-01-17 NOTE — HISTORY OF PRESENT ILLNESS
[CCP] : Cyclic citrullinated peptide (CCP) antibody [Erosions] : erosions [Interstitial Lung Disease] : interstitial lung disease [Joint Pain] : joint pain [FreeTextEntry1] : JESSA PARK is a 68 year  old female, seen on today  for  RA Rituxan on 6-14-23 and 6-28-23.  return of pain in the knees, left shoulder and hands  x 3 weeks => tylenol only helped a little  arthritis is well controlled and minimal pain in the joints.  + fatigue  (sleeps 9 hours a night, denies snoring) has some joint pain   UTI symptoms - saw pmd and tried abx and cx not remarkable - seeing urogynecolgy and seeing urogyn on 2-1-24.  + burning and pain with urination.    OA - at baseline   OP  dexa in  with openia and low frax but DEXA from  with OSTEOPOROSIS    Pulm is requesting an open lung biopsy given worsening of ILD on PFT (pulm note ) .  saw dr. manning (11-) and note reviewed - pending date for surgery  hasn't done echo yet and will make appointment      [Joint Swelling] : no joint swelling [Rash] : no rash [Shortness of Breath] : no shortness of breath [Ocular Symptoms] : no ocular symptoms [Dysphonia] : no dysphonia [Morning Stiffness] : no morning stiffness [Chest Pain] : no chest pain [Fatigue] : no fatigue [TextBox_2] : 1990's [TextBox_38] : MTX [TextBox_42] : humira, enbrel, remicade (2013-208), simponi (2018 x 2 doses), Xeljanz (6-2018-> 4/2022);  Orencia (4-2022 -> 8-2022) [TextBox_44] : Rituxan (8/29 and 9/12) [TextBox_70] : feels much better since the rituxan  joint pain is alot better and minimal joint pain

## 2024-01-20 PROBLEM — R01.1 HEART MURMUR: Status: ACTIVE | Noted: 2023-04-06

## 2024-01-22 ENCOUNTER — APPOINTMENT (OUTPATIENT)
Dept: UROLOGY | Facility: CLINIC | Age: 69
End: 2024-01-22

## 2024-01-23 DIAGNOSIS — R30.0 DYSURIA: ICD-10-CM

## 2024-01-23 DIAGNOSIS — M21.941 UNSPECIFIED ACQUIRED DEFORMITY OF HAND, RIGHT HAND: ICD-10-CM

## 2024-01-23 DIAGNOSIS — N34.3 URETHRAL SYNDROME, UNSPECIFIED: ICD-10-CM

## 2024-01-23 DIAGNOSIS — I25.10 ATHEROSCLEROTIC HEART DISEASE OF NATIVE CORONARY ARTERY WITHOUT ANGINA PECTORIS: ICD-10-CM

## 2024-01-23 LAB
25(OH)D3 SERPL-MCNC: 32.9 NG/ML
ALBUMIN SERPL ELPH-MCNC: 4 G/DL
ALP BLD-CCNC: 63 U/L
ALT SERPL-CCNC: 13 U/L
ANION GAP SERPL CALC-SCNC: 13 MMOL/L
AST SERPL-CCNC: 16 U/L
BASOPHILS # BLD AUTO: 0.05 K/UL
BASOPHILS NFR BLD AUTO: 0.7 %
BILIRUB SERPL-MCNC: 0.3 MG/DL
BUN SERPL-MCNC: 18 MG/DL
CALCIUM SERPL-MCNC: 9.2 MG/DL
CD16+CD56+ CELLS # BLD: 188 CELLS/UL
CD16+CD56+ CELLS NFR BLD: 14 %
CD19 CELLS NFR BLD: 40 CELLS/UL
CD3 CELLS # BLD: 1115 CELLS/UL
CD3 CELLS NFR BLD: 83 %
CD3+CD4+ CELLS # BLD: 739 CELLS/UL
CD3+CD4+ CELLS NFR BLD: 57 %
CD3+CD4+ CELLS/CD3+CD8+ CLL SPEC: 2.09 RATIO
CD3+CD8+ CELLS # SPEC: 354 CELLS/UL
CD3+CD8+ CELLS NFR BLD: 27 %
CELLS.CD3-CD19+/CELLS IN BLOOD: 3 %
CHLORIDE SERPL-SCNC: 104 MMOL/L
CO2 SERPL-SCNC: 24 MMOL/L
CREAT SERPL-MCNC: 0.76 MG/DL
CRP SERPL-MCNC: <3 MG/L
EGFR: 85 ML/MIN/1.73M2
EOSINOPHIL # BLD AUTO: 0.39 K/UL
EOSINOPHIL NFR BLD AUTO: 5.2 %
ERYTHROCYTE [SEDIMENTATION RATE] IN BLOOD BY WESTERGREN METHOD: 44 MM/HR
GLUCOSE SERPL-MCNC: 93 MG/DL
HAV IGM SER QL: NONREACTIVE
HBV CORE IGG+IGM SER QL: NONREACTIVE
HBV CORE IGM SER QL: NONREACTIVE
HBV SURFACE AG SER QL: NONREACTIVE
HCT VFR BLD CALC: 42.8 %
HCV AB SER QL: NONREACTIVE
HCV S/CO RATIO: 0.13 S/CO
HGB BLD-MCNC: 13.4 G/DL
IGA SER QL IEP: 484 MG/DL
IGG SER QL IEP: 1476 MG/DL
IGM SER QL IEP: 53 MG/DL
IMM GRANULOCYTES NFR BLD AUTO: 0.3 %
LYMPHOCYTES # BLD AUTO: 1.6 K/UL
LYMPHOCYTES NFR BLD AUTO: 21.5 %
MAN DIFF?: NORMAL
MCHC RBC-ENTMCNC: 28.9 PG
MCHC RBC-ENTMCNC: 31.3 GM/DL
MCV RBC AUTO: 92.4 FL
MONOCYTES # BLD AUTO: 0.51 K/UL
MONOCYTES NFR BLD AUTO: 6.9 %
NEUTROPHILS # BLD AUTO: 4.87 K/UL
NEUTROPHILS NFR BLD AUTO: 65.4 %
PLATELET # BLD AUTO: 240 K/UL
POTASSIUM SERPL-SCNC: 4.1 MMOL/L
PROT SERPL-MCNC: 7.2 G/DL
RBC # BLD: 4.63 M/UL
RBC # FLD: 13 %
SODIUM SERPL-SCNC: 142 MMOL/L
WBC # FLD AUTO: 7.44 K/UL

## 2024-01-23 RX ORDER — METHYLPREDNISOLONE SODIUM SUCCINATE 125 MG/2ML
125 INJECTION, POWDER, FOR SOLUTION INTRAMUSCULAR; INTRAVENOUS
Refills: 0 | Status: COMPLETED | OUTPATIENT
Start: 2024-01-23 | End: 1900-01-01

## 2024-01-23 RX ORDER — CETIRIZINE HYDROCHLORIDE 10 MG/1
10 CAPSULE, LIQUID FILLED ORAL
Refills: 0 | Status: COMPLETED | OUTPATIENT
Start: 2024-01-23 | End: 1900-01-01

## 2024-01-24 LAB
M TB IFN-G BLD-IMP: NEGATIVE
QUANTIFERON TB PLUS MITOGEN MINUS NIL: 4.92 IU/ML
QUANTIFERON TB PLUS NIL: 0.02 IU/ML
QUANTIFERON TB PLUS TB1 MINUS NIL: 0.01 IU/ML
QUANTIFERON TB PLUS TB2 MINUS NIL: 0 IU/ML

## 2024-01-25 RX ORDER — RITUXIMAB-ABBS 10 MG/ML
500 INJECTION, SOLUTION INTRAVENOUS
Refills: 0 | Status: COMPLETED | OUTPATIENT
Start: 2024-01-25 | End: 1900-01-01

## 2024-01-25 RX ORDER — RITUXIMAB 10 MG/ML
500 INJECTION, SOLUTION INTRAVENOUS
Refills: 0 | Status: DISCONTINUED | OUTPATIENT
Start: 2024-01-23 | End: 2024-01-25

## 2024-01-26 ENCOUNTER — LABORATORY RESULT (OUTPATIENT)
Age: 69
End: 2024-01-26

## 2024-01-26 ENCOUNTER — APPOINTMENT (OUTPATIENT)
Dept: GASTROENTEROLOGY | Facility: CLINIC | Age: 69
End: 2024-01-26
Payer: MEDICARE

## 2024-01-26 PROCEDURE — 43239 EGD BIOPSY SINGLE/MULTIPLE: CPT

## 2024-01-26 PROCEDURE — 45378 DIAGNOSTIC COLONOSCOPY: CPT

## 2024-02-01 ENCOUNTER — APPOINTMENT (OUTPATIENT)
Dept: OBGYN | Facility: CLINIC | Age: 69
End: 2024-02-01
Payer: MEDICARE

## 2024-02-01 VITALS — DIASTOLIC BLOOD PRESSURE: 75 MMHG | WEIGHT: 133 LBS | BODY MASS INDEX: 24.33 KG/M2 | SYSTOLIC BLOOD PRESSURE: 120 MMHG

## 2024-02-01 DIAGNOSIS — Z01.419 ENCOUNTER FOR GYNECOLOGICAL EXAMINATION (GENERAL) (ROUTINE) W/OUT ABNORMAL FINDINGS: ICD-10-CM

## 2024-02-01 PROCEDURE — 99397 PER PM REEVAL EST PAT 65+ YR: CPT

## 2024-02-01 NOTE — HISTORY OF PRESENT ILLNESS
[FreeTextEntry1] : 69yo  Bul[place]ian Speaking here for wellness, had UTI sx before and given abx and now better. Had some suprapubic discomfort that now improved. Hx of RA on infusions, CAD.   Pap 2022 nilm Jennifer 10/2023 normal

## 2024-02-06 ENCOUNTER — APPOINTMENT (OUTPATIENT)
Dept: UROLOGY | Facility: CLINIC | Age: 69
End: 2024-02-06
Payer: MEDICARE

## 2024-02-06 VITALS
SYSTOLIC BLOOD PRESSURE: 122 MMHG | BODY MASS INDEX: 24.48 KG/M2 | DIASTOLIC BLOOD PRESSURE: 79 MMHG | OXYGEN SATURATION: 99 % | HEART RATE: 63 BPM | WEIGHT: 133 LBS | TEMPERATURE: 97.6 F | RESPIRATION RATE: 15 BRPM | HEIGHT: 62 IN

## 2024-02-06 DIAGNOSIS — R10.9 UNSPECIFIED ABDOMINAL PAIN: ICD-10-CM

## 2024-02-06 DIAGNOSIS — R35.0 FREQUENCY OF MICTURITION: ICD-10-CM

## 2024-02-06 LAB — CYTOLOGY CVX/VAG DOC THIN PREP: ABNORMAL

## 2024-02-06 PROCEDURE — 99204 OFFICE O/P NEW MOD 45 MIN: CPT

## 2024-02-06 RX ORDER — SOLIFENACIN SUCCINATE 5 MG/1
5 TABLET ORAL
Qty: 90 | Refills: 3 | Status: ACTIVE | COMMUNITY
Start: 2024-02-06 | End: 1900-01-01

## 2024-02-06 NOTE — PHYSICAL EXAM
[General Appearance - Well Developed] : well developed [General Appearance - Well Nourished] : well nourished [Heart Rate And Rhythm] : heart rate and rhythm were normal [] : no respiratory distress [Respiration, Rhythm And Depth] : normal respiratory rhythm and effort [Bowel Sounds] : normal bowel sounds [Abdomen Soft] : soft [Normal Station and Gait] : the gait and station were normal for the patient's age [Skin Color & Pigmentation] : normal skin color and pigmentation [Skin Turgor] : supple [No Focal Deficits] : no focal deficits [Oriented To Time, Place, And Person] : oriented to person, place, and time [Not Anxious] : not anxious [de-identified] : mild L CVAT

## 2024-02-06 NOTE — ASSESSMENT
[FreeTextEntry1] : 68 y o F with urinary frequency and L >R flank pain  #Urinary frequency - UCx today - Trial of vesicare 5 mg PO QD. AE d/w patient  #Flank pain  - Will obtain US renal

## 2024-02-06 NOTE — HISTORY OF PRESENT ILLNESS
[FreeTextEntry1] : : 1955  Referring Provider: WILL GONZALEZ,BRIT JO   HPI: Ms. JESSA APRK is a 68 year yo F with a PMHx notable for flank pain L > R for the last 2 months, she has seen her PCP Dr. Moulton who initiated the referral. Urine is more concentrated as well. No blood in the urine. She is going to the restroom approximately every 15 minutes daytime, nocturia x 3. When she urinates she feels that she completely empties her bladder.  No burning when she urinates. A month ago, she had infection of her bladder, she took an antibiotic which improved her symptoms. No sypmtoms now. She has some complaints with urinary frequency.   Anticoagulation: None All: NKDA Social: nonsomker, occasional EtOH,  with 1 child PMHx: RA on infusions, CAD FHx: None PSHx: C section x 1   [Urinary Incontinence] : no urinary incontinence [Urinary Retention] : no urinary retention [Urinary Urgency] : urinary urgency [Urinary Frequency] : urinary frequency

## 2024-02-10 ENCOUNTER — TRANSCRIPTION ENCOUNTER (OUTPATIENT)
Age: 69
End: 2024-02-10

## 2024-02-10 LAB — BACTERIA UR CULT: NORMAL

## 2024-02-21 ENCOUNTER — APPOINTMENT (OUTPATIENT)
Dept: PULMONOLOGY | Facility: CLINIC | Age: 69
End: 2024-02-21

## 2024-03-02 ENCOUNTER — EMERGENCY (EMERGENCY)
Facility: HOSPITAL | Age: 69
LOS: 1 days | Discharge: HOME | End: 2024-03-02
Attending: EMERGENCY MEDICINE
Payer: MEDICARE

## 2024-03-02 VITALS
HEART RATE: 65 BPM | DIASTOLIC BLOOD PRESSURE: 67 MMHG | TEMPERATURE: 99 F | WEIGHT: 138.01 LBS | RESPIRATION RATE: 19 BRPM | SYSTOLIC BLOOD PRESSURE: 105 MMHG | OXYGEN SATURATION: 97 %

## 2024-03-02 VITALS
RESPIRATION RATE: 16 BRPM | HEART RATE: 57 BPM | TEMPERATURE: 99 F | SYSTOLIC BLOOD PRESSURE: 117 MMHG | OXYGEN SATURATION: 97 % | DIASTOLIC BLOOD PRESSURE: 82 MMHG

## 2024-03-02 DIAGNOSIS — Z98.890 OTHER SPECIFIED POSTPROCEDURAL STATES: Chronic | ICD-10-CM

## 2024-03-02 DIAGNOSIS — Z98.1 ARTHRODESIS STATUS: Chronic | ICD-10-CM

## 2024-03-02 PROCEDURE — 99284 EMERGENCY DEPT VISIT MOD MDM: CPT | Mod: 57

## 2024-03-02 PROCEDURE — 96374 THER/PROPH/DIAG INJ IV PUSH: CPT | Mod: XU

## 2024-03-02 PROCEDURE — 73060 X-RAY EXAM OF HUMERUS: CPT

## 2024-03-02 PROCEDURE — 99284 EMERGENCY DEPT VISIT MOD MDM: CPT | Mod: 25

## 2024-03-02 PROCEDURE — 73030 X-RAY EXAM OF SHOULDER: CPT | Mod: 26,RT

## 2024-03-02 PROCEDURE — 73020 X-RAY EXAM OF SHOULDER: CPT

## 2024-03-02 PROCEDURE — 73070 X-RAY EXAM OF ELBOW: CPT

## 2024-03-02 PROCEDURE — 73070 X-RAY EXAM OF ELBOW: CPT | Mod: 26,RT

## 2024-03-02 PROCEDURE — 73030 X-RAY EXAM OF SHOULDER: CPT

## 2024-03-02 PROCEDURE — 23650 CLTX SHO DSLC W/MNPJ WO ANES: CPT | Mod: RT

## 2024-03-02 PROCEDURE — 23650 CLTX SHO DSLC W/MNPJ WO ANES: CPT | Mod: 54,RT

## 2024-03-02 PROCEDURE — 73060 X-RAY EXAM OF HUMERUS: CPT | Mod: 26,RT

## 2024-03-02 RX ORDER — KETOROLAC TROMETHAMINE 30 MG/ML
15 SYRINGE (ML) INJECTION ONCE
Refills: 0 | Status: DISCONTINUED | OUTPATIENT
Start: 2024-03-02 | End: 2024-03-02

## 2024-03-02 RX ORDER — ACETAMINOPHEN 500 MG
1000 TABLET ORAL ONCE
Refills: 0 | Status: DISCONTINUED | OUTPATIENT
Start: 2024-03-02 | End: 2024-03-02

## 2024-03-02 RX ADMIN — Medication 15 MILLIGRAM(S): at 12:31

## 2024-03-02 NOTE — ED ADULT NURSE NOTE - OBJECTIVE STATEMENT
Pt is 69y F with PMH HLD, rheumatoid arthritis complaining of R shoulder pain. Pt reports mechanical fall this morning, tripped on carpet, and landed on R hand. [place] "crack" near R shoulder after landing on RUE, and endorses R middle finger numbness and pain radiating up to R shoulder. Reports decreased sensation of RUE compared to LUE, with weaker  strength of R hand than L. R shoulder deformity assessed. +2 radial pulses. Reports taking 1000mg of tylenol 1030 this morning prior to arrival.

## 2024-03-02 NOTE — ED PROVIDER NOTE - CARE PLAN
Principal Discharge DX:	Dislocation of right shoulder joint   1 Principal Discharge DX:	Dislocation of right shoulder joint  Secondary Diagnosis:	Bankart lesion of right shoulder

## 2024-03-02 NOTE — ED PROVIDER NOTE - PROGRESS NOTE DETAILS
Pt states feeling improved after closed shoulder reduction. N/V- intact. A shoulder immobilizer applied.

## 2024-03-02 NOTE — ED PROVIDER NOTE - CARE PROVIDER_API CALL
Clay Escobar  Orthopaedic Surgery  [address], [facility] [address]  [place] [address]  Phone: [phone]  Fax: [phone]  Follow Up Time:

## 2024-03-02 NOTE — ED PROVIDER NOTE - NSFOLLOWUPINSTRUCTIONS_ED_ALL_ED_FT
Please see the information of Shoulder dislocation and shoulder immobilizer.    Keep the immobilizer all the time and follow up with orthopedist Dr. Escobar for reevaluation, call Monday for appointment.    Keep continue your current medications as prescribed.    Take Tylenol (2 tablets of 500mg every 8hours) as needed for pain.    Return for any concerns, fever, numbness, weakness, or worsening pain.

## 2024-03-02 NOTE — ED PROVIDER NOTE - CLINICAL SUMMARY MEDICAL DECISION MAKING FREE TEXT BOX
69yr F hx of RA on rituximan htn asthma w rt shoulder pain after a fall 10 am today. mechanical fall in nature, no head trauma, no LOC but intense pain and difficulty moving the shoulder. denies beingon AC, no frequent falls. denies cp, sob, abd pain.  exam notable for well appearing, no acute distress, neuro intact except limited rom of shoulder. clear lungs, S1-2, soft non tender abd, shoulder exam notable for limited movement. fx vs dislo. will do xray and plan for reduction.  xray showed ?bankart as well as inf ant dislo, in an attempt to reexamine, self reduced. pain improves significantly, repeat imaging confirms. placed in a sling and f/u. pt is agree with plan.

## 2024-03-02 NOTE — ED ADULT NURSE NOTE - NSFALLUNIVINTERV_ED_ALL_ED
Bed/Stretcher in lowest position, wheels locked, appropriate side rails in place/Call bell, personal items and telephone in reach/Instruct patient to call for assistance before getting out of bed/chair/stretcher/Non-slip footwear applied when patient is off stretcher/[place] to call system/Physically safe environment - no spills, clutter or unnecessary equipment/Purposeful proactive rounding/Room/bathroom lighting operational, light cord in reach

## 2024-03-02 NOTE — ED PROVIDER NOTE - PATIENT PORTAL LINK FT
You can access the FollowMyHealth Patient Portal offered by [facility] by registering at the following website: [email or web address]. By joining [facility]’s FollowMyHealth portal, you will also be able to view your health information using other applications (apps) compatible with our system.

## 2024-03-02 NOTE — ED ADULT NURSE NOTE - NS ED NOTE ABUSE SUSPICION NEGLECT YN
Otc Regimen: CeraVe itch relief apply as needed, keep in refrigerator Initiate Treatment: Cool showers, ice packs Continue Regimen: Triamcinolone cream twice daily for 2 weeks then stop for 2 weeks then repeat Detail Level: Detailed No

## 2024-03-02 NOTE — ED PROVIDER NOTE - PHYSICAL EXAMINATION
NAD. VSS. afebrile. No facial or scalp tender. Neck supple. Lungs clear. No spinal tender. +Right shoulder deform, tender, and diminished ROMs. +Mild right elbow tender with full ROMs. N/V- intact. Chronic b/l hand joints deformities without tender. No gross neuro deficit.

## 2024-03-02 NOTE — ED PROVIDER NOTE - OBJECTIVE STATEMENT
70yo female pt with PMHx of RA on Rituxan, HTN, HLD, Asthma, Anxiety, GERD presents to ED with right dominant shoulder pain s/p mechanical fall this 10am. Reports she tripped on a carpet and fell on right hand. Denies LOC, head injury, or other injuries. Denies sensory changes or weakness to extremities. Denies fever, chills, or recent sickness. Denies headache, dizziness, neck or back pain. Denies CP/SOB/ABD pain or hip pain. Denies charli shoulder dislocation.

## 2024-03-04 ENCOUNTER — RX RENEWAL (OUTPATIENT)
Age: 69
End: 2024-03-04

## 2024-03-08 ENCOUNTER — APPOINTMENT (OUTPATIENT)
Dept: PULMONOLOGY | Facility: CLINIC | Age: 69
End: 2024-03-08
Payer: MEDICARE

## 2024-03-08 ENCOUNTER — APPOINTMENT (OUTPATIENT)
Dept: ORTHOPEDIC SURGERY | Facility: CLINIC | Age: 69
End: 2024-03-08
Payer: MEDICARE

## 2024-03-08 VITALS
TEMPERATURE: 97.4 F | HEART RATE: 55 BPM | WEIGHT: 133 LBS | DIASTOLIC BLOOD PRESSURE: 76 MMHG | BODY MASS INDEX: 24.48 KG/M2 | HEIGHT: 62 IN | OXYGEN SATURATION: 98 % | SYSTOLIC BLOOD PRESSURE: 124 MMHG

## 2024-03-08 VITALS
DIASTOLIC BLOOD PRESSURE: 78 MMHG | HEART RATE: 60 BPM | BODY MASS INDEX: 25.86 KG/M2 | WEIGHT: 137 LBS | SYSTOLIC BLOOD PRESSURE: 122 MMHG | HEIGHT: 61 IN | OXYGEN SATURATION: 99 %

## 2024-03-08 DIAGNOSIS — S82.024A NONDISPLACED LONGITUDINAL FRACTURE OF RIGHT PATELLA, INITIAL ENCOUNTER FOR CLOSED FRACTURE: ICD-10-CM

## 2024-03-08 DIAGNOSIS — J84.9 INTERSTITIAL PULMONARY DISEASE, UNSPECIFIED: ICD-10-CM

## 2024-03-08 DIAGNOSIS — S43.014A ANTERIOR DISLOCATION OF RIGHT HUMERUS, INITIAL ENCOUNTER: ICD-10-CM

## 2024-03-08 PROCEDURE — ZZZZZ: CPT

## 2024-03-08 PROCEDURE — 94010 BREATHING CAPACITY TEST: CPT

## 2024-03-08 PROCEDURE — 99203 OFFICE O/P NEW LOW 30 MIN: CPT | Mod: 25

## 2024-03-08 PROCEDURE — 94729 DIFFUSING CAPACITY: CPT

## 2024-03-08 PROCEDURE — 73564 X-RAY EXAM KNEE 4 OR MORE: CPT | Mod: RT

## 2024-03-08 PROCEDURE — 94726 PLETHYSMOGRAPHY LUNG VOLUMES: CPT

## 2024-03-08 PROCEDURE — 99214 OFFICE O/P EST MOD 30 MIN: CPT | Mod: 57,25

## 2024-03-08 PROCEDURE — 27520 TREAT KNEECAP FRACTURE: CPT | Mod: 59,RT

## 2024-03-08 NOTE — PHYSICAL EXAM
[No Acute Distress] : no acute distress [Normal Rate/Rhythm] : normal rate/rhythm [Normal Appearance] : normal appearance [Normal S1, S2] : normal s1, s2 [No Resp Distress] : no resp distress [Clear to Auscultation Bilaterally] : clear to auscultation bilaterally [No Focal Deficits] : no focal deficits [Oriented x3] : oriented x3 [TextBox_105] : Rheumatoid deformities of her hands

## 2024-03-08 NOTE — REASON FOR VISIT
[Initial] : an initial visit [Shortness of Breath] : shortness of breath [ILD] : ILD [Spouse] : spouse

## 2024-03-08 NOTE — HISTORY OF PRESENT ILLNESS
[TextBox_4] : 69-year-old female with very longstanding rheumatoid arthritis associated with pulmonary fibrosis treated in the past with intermittent Rituxan infusions.  She reports mild dyspnea on exertion.  Lung functions have been performed over the last 4 years and most recently she been advised to have a lung biopsy.  Her chest CT has shown slight progression

## 2024-03-08 NOTE — REVIEW OF SYSTEMS
[SOB on Exertion] : sob on exertion [GERD] : gerd [Arthralgias] : arthralgias [Negative] : Endocrine

## 2024-03-08 NOTE — DISCUSSION/SUMMARY
[FreeTextEntry1] : I reviewed a number of her lung function studies dating back to 2019 as well as several CAT scans.  Her lung functions actually have changed very little over the years.  Similarly there has been very little progression of the pulmonary fibrosis.  I do not see any reason that she should undergo a lung biopsy and I have discussed that with her and her .  My current plan is to follow her every 6 months, annually with lung function studies and depending on the findings, perhaps imaging.

## 2024-03-11 ENCOUNTER — APPOINTMENT (OUTPATIENT)
Dept: RHEUMATOLOGY | Facility: CLINIC | Age: 69
End: 2024-03-11
Payer: MEDICARE

## 2024-03-11 VITALS
TEMPERATURE: 98 F | HEART RATE: 71 BPM | DIASTOLIC BLOOD PRESSURE: 59 MMHG | RESPIRATION RATE: 16 BRPM | OXYGEN SATURATION: 96 % | SYSTOLIC BLOOD PRESSURE: 86 MMHG

## 2024-03-11 VITALS
RESPIRATION RATE: 16 BRPM | HEART RATE: 64 BPM | DIASTOLIC BLOOD PRESSURE: 69 MMHG | SYSTOLIC BLOOD PRESSURE: 105 MMHG | OXYGEN SATURATION: 95 % | TEMPERATURE: 97.6 F

## 2024-03-11 VITALS
SYSTOLIC BLOOD PRESSURE: 98 MMHG | RESPIRATION RATE: 16 BRPM | TEMPERATURE: 97.88 F | HEART RATE: 56 BPM | DIASTOLIC BLOOD PRESSURE: 68 MMHG | OXYGEN SATURATION: 96 %

## 2024-03-11 PROBLEM — S82.024A CLOSED NONDISPLACED LONGITUDINAL FRACTURE OF RIGHT PATELLA, INITIAL ENCOUNTER: Status: ACTIVE | Noted: 2024-03-11

## 2024-03-11 PROBLEM — S43.014A ANTERIOR SHOULDER DISLOCATION, RIGHT, INITIAL ENCOUNTER: Status: ACTIVE | Noted: 2024-03-11

## 2024-03-11 PROCEDURE — 96413 CHEMO IV INFUSION 1 HR: CPT

## 2024-03-11 PROCEDURE — 96415 CHEMO IV INFUSION ADDL HR: CPT

## 2024-03-11 PROCEDURE — 96374 THER/PROPH/DIAG INJ IV PUSH: CPT | Mod: 59

## 2024-03-11 RX ORDER — CETIRIZINE HYDROCHLORIDE 10 MG/1
10 CAPSULE, LIQUID FILLED ORAL
Qty: 0 | Refills: 0 | Status: COMPLETED
Start: 2024-01-23

## 2024-03-11 RX ORDER — RITUXIMAB-ABBS 10 MG/ML
500 INJECTION, SOLUTION INTRAVENOUS
Qty: 0 | Refills: 0 | Status: COMPLETED
Start: 2024-01-25

## 2024-03-11 RX ORDER — METHYLPREDNISOLONE SODIUM SUCCINATE 125 MG/2ML
125 INJECTION, POWDER, FOR SOLUTION INTRAMUSCULAR; INTRAVENOUS
Qty: 0 | Refills: 0 | Status: COMPLETED
Start: 2024-01-23

## 2024-03-11 NOTE — PHYSICAL EXAM
[de-identified] : Examination of the cervical spine demonstrates no tenderness, no deformity and no muscle spasm. Cervical spine rotation is 60 to the right, 60 to the left, 75 of extension and 45 of flexion. Neurologic exam of the upper extremities reveals intact sensation to light touch. Motor function is 5 over 5 in all groups. Deep tendon reflexes are 2+ and equal at the biceps, triceps and brachioradialis. The right shoulder is clinically located.  There is mild tenderness.  Range of motion is not assessed because of the recent dislocation.  Elbow motion is pain-free.  Skin is intact.  There is no upper extremity lymphedema. There is no pain with motion of the right hip.  There is tenderness of the right patella.  She has active quadriceps function.  There is no instability of collateral or cruciate ligaments.  Range of motion 0 to 100 degrees with mild pain.  Calves are soft and nontender.  The skin is intact.  There is no lymphedema. [de-identified] : ACC: [id number] EXAM: XR SHOULDER COMP MIN 2V RT ORDERED BY: SAROJ VEGA ACC: [id number] EXAM: XR SHOULDER AXILLARY 1 VIEW RT ORDERED BY: SAROJ VEGA PROCEDURE DATE: 03/02/2024 INTERPRETATION: CLINICAL INFORMATION: Pain.  COMPARISON: Radiographs performed earlier on same date.  TECHNIQUE: 4 views of the right shoulder.  IMPRESSION: Status post interval successful closed reduction of previously noted anterior glenohumeral dislocation with anatomic alignment obtained. Suspect large mildly impacted bony Bankart lesion. No definite Hill-Sachs lesion. Consider follow-up outpatient MRI as clinically indicated.  Mild to moderate acromioclavicular and glenohumeral arthrosis. Bony mineralization within normal limits. No aggressive osseous neoplasm.  --- End of Report ---    CHEO DE LA CRUZ MD; Attending Radiologist This document has been electronically signed. Mar 2 2024 3:29PM   I have reviewed shoulder x-rays taken in the emergency room.  She had an anterior dislocation with concentric reduction. AP, lateral, tunnel and sunrise x-rays of the right knee taken today demonstrate nondisplaced longitudinal fracture of the right patella

## 2024-03-11 NOTE — DISCUSSION/SUMMARY
[de-identified] : The patient had an anterior dislocation of the right shoulder which has been reduced.  Treatment will be continued use of the shoulder immobilizer until reevaluation in 2 weeks.  In terms of her right knee she has a nondisplaced longitudinal fracture of the right patella.  Range of motion is permitted in a patella brace.  She will be reevaluated with new x-rays in 2 weeks.

## 2024-03-11 NOTE — HISTORY OF PRESENT ILLNESS
[de-identified] : 69-year-old RHD female presents for initial evaluation of right shoulder s/p dislocation 3/2/24. Her slippers got caught on the carpet at home, fell on to an outstretched arm. went to ER, had the shoulder reduced. She has been maintained in a sling. She has some pain when she takes her arm out of the sling, and it rests at her side. She has been taking Tylenol with good relief. She does report numbness and tingling in the right arm. No prior right shoulder injuries or dislocations.  She also sustained a fall while coming in to the office today, hit her right knee on the ground. She is currently experiencing anterior knee pain. She denies prior right knee injuries.

## 2024-03-11 NOTE — HISTORY OF PRESENT ILLNESS
[5] : 5 [N/A] : N/A [Denies] : Denies [Yes] : Yes [Declined] : Declined [Left upper extremity] : Left upper extremity [24g] : 24g [Start Time: ___] : Medication Start Time: [unfilled] [End Time: ___] : Medication End Time: [unfilled] [Medication Name: ___] : Medication Name: [unfilled] [Total Amount Administered: ___] : Total Amount Administered: [unfilled] [IV discontinued. Intact. No signs or symptoms of IV complications noted. Time: ___] : IV discontinued. Intact. No signs or symptoms of IV complications noted. Time: [unfilled] [Patient  instructed to seek medical attention with signs and symptoms of adverse effects] : Patient  instructed to seek medical attention with signs and symptoms of adverse effects [Patient left unit in no acute distress] : Patient left unit in no acute distress [Medications administered as ordered and tolerated well.] : Medications administered as ordered and tolerated well. [Blood drawn at time of visit] : Blood drawn at time of visit [de-identified] : b/l hands, knees, and back [de-identified] : stiffness [de-identified] : Left median cubital vein  [de-identified] : Patient presents for scheduled Truxima infusion, dose 1:2, in NAD. Today, patient reports pain as rated above. Patient noted with swelling and deformities to bilateral hands, unchanged as per patient. Patient reports having 2 recent falls last week, one fell on to an outstretched arm and resulted in right shoulder dislocation and denied fracture; one hit her right knee on the ground, denied fracture and wearing a knee brace now. Patient admits to generalized joint stiffness, moderate to severe weakness and daily fatigue. Reports taking Prednisone 5mg daily. Patient denies any recent fever, infection, or surgery. No other symptoms or concerns were verbalized. Infusion titrated as per protocol. Vital signs cycled and stable throughout infusion. In conclusion, patient left the unit in [facility].

## 2024-03-12 ENCOUNTER — NON-APPOINTMENT (OUTPATIENT)
Age: 69
End: 2024-03-12

## 2024-03-14 ENCOUNTER — OUTPATIENT (OUTPATIENT)
Dept: OUTPATIENT SERVICES | Facility: HOSPITAL | Age: 69
LOS: 1 days | End: 2024-03-14
Payer: MEDICARE

## 2024-03-14 ENCOUNTER — APPOINTMENT (OUTPATIENT)
Dept: INTERNAL MEDICINE | Facility: CLINIC | Age: 69
End: 2024-03-14
Payer: MEDICARE

## 2024-03-14 VITALS
WEIGHT: 140 LBS | BODY MASS INDEX: 26.43 KG/M2 | SYSTOLIC BLOOD PRESSURE: 110 MMHG | HEIGHT: 61 IN | HEART RATE: 69 BPM | OXYGEN SATURATION: 98 % | DIASTOLIC BLOOD PRESSURE: 70 MMHG

## 2024-03-14 DIAGNOSIS — Z91.81 HISTORY OF FALLING: ICD-10-CM

## 2024-03-14 DIAGNOSIS — I10 ESSENTIAL (PRIMARY) HYPERTENSION: ICD-10-CM
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
## 2024-03-14 DIAGNOSIS — Z98.1 ARTHRODESIS STATUS: Chronic | ICD-10-CM

## 2024-03-14 DIAGNOSIS — Z98.890 OTHER SPECIFIED POSTPROCEDURAL STATES: Chronic | ICD-10-CM

## 2024-03-14 PROCEDURE — 99214 OFFICE O/P EST MOD 30 MIN: CPT

## 2024-03-14 PROCEDURE — G0463: CPT

## 2024-03-14 RX ORDER — ATORVASTATIN CALCIUM 20 MG/1
20 TABLET, FILM COATED ORAL DAILY
Qty: 90 | Refills: 3 | Status: ACTIVE | COMMUNITY
Start: 2018-06-13 | End: 1900-01-01

## 2024-03-14 NOTE — ASSESSMENT
[FreeTextEntry1] : 1) resolved RLQ pain from R back to RLQ relieved w defecate Normal CPE today  2) diverticulosis constipation COLACE  miralax pear coffee apple  avoid platanos banana incr fiber and water 3) decr atorv back to 20mg refuses 40mg CAD carvedilol statin ASA 4) recurrent FALLS   STARS PT GAIT TRAIN  strengthening of core and LE

## 2024-03-14 NOTE — HISTORY OF PRESENT ILLNESS
[FreeTextEntry8] : 68 yo RA, --> pulmonary FIBROSIS  MCGINNIS  need LUNG BX   chest CT MILD progression of pulm fibrosis  NO BX as per PULM at this time thinning intra atrial septum, min MR CTA 2021 mild/moderate CAD  ASA statin COREG zetia  Presents with RLQ ABD PAIN on Sunday March 10 gas from R back to RLQ 1am all day in bed  sick 10 HOURS better when made BM No fever no blood.  BETTER when BM Diverticulosis in DC and SIGMOID  Constipation beforehand.   Fall 2 weeks ago  R knee and R shoulder went ED  Xray  dislocation   Xray no fx dislocation relo Fall again 1 week later  R knee  chip R knee when going for INFUSION  atorvastatin 80mg   was told no more states 40mg pain atorvastatin

## 2024-03-14 NOTE — PHYSICAL EXAM
[No Acute Distress] : no acute distress [Well Nourished] : well nourished [Well Developed] : well developed [Normal Sclera/Conjunctiva] : normal sclera/conjunctiva [Well-Appearing] : well-appearing [PERRL] : pupils equal round and reactive to light [EOMI] : extraocular movements intact [Normal Outer Ear/Nose] : the outer ears and nose were normal in appearance [Normal Oropharynx] : the oropharynx was normal [No JVD] : no jugular venous distention [No Lymphadenopathy] : no lymphadenopathy [Supple] : supple [No Respiratory Distress] : no respiratory distress  [Thyroid Normal, No Nodules] : the thyroid was normal and there were no nodules present [No Accessory Muscle Use] : no accessory muscle use [Clear to Auscultation] : lungs were clear to auscultation bilaterally [Normal Rate] : normal rate  [Regular Rhythm] : with a regular rhythm [Normal S1, S2] : normal S1 and S2 [No Murmur] : no murmur heard [No Abdominal Bruit] : a ~M bruit was not heard ~T in the abdomen [No Carotid Bruits] : no carotid bruits [No Varicosities] : no varicosities [Pedal Pulses Present] : the pedal pulses are present [No Edema] : there was no peripheral edema [No Palpable Aorta] : no palpable aorta [No Extremity Clubbing/Cyanosis] : no extremity clubbing/cyanosis [Soft] : abdomen soft [Non Tender] : non-tender [Non-distended] : non-distended [No Masses] : no abdominal mass palpated [No HSM] : no HSM [Normal Bowel Sounds] : normal bowel sounds [Normal Anterior Cervical Nodes] : no anterior cervical lymphadenopathy [Normal Posterior Cervical Nodes] : no posterior cervical lymphadenopathy [No CVA Tenderness] : no CVA  tenderness [No Spinal Tenderness] : no spinal tenderness [No Joint Swelling] : no joint swelling [Grossly Normal Strength/Tone] : grossly normal strength/tone [No Rash] : no rash [Coordination Grossly Intact] : coordination grossly intact [No Focal Deficits] : no focal deficits [Normal Gait] : normal gait [Deep Tendon Reflexes (DTR)] : deep tendon reflexes were 2+ and symmetric [Normal Affect] : the affect was normal [Normal Insight/Judgement] : insight and judgment were intact
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
## 2024-03-19 DIAGNOSIS — M06.9 RHEUMATOID ARTHRITIS, UNSPECIFIED: ICD-10-CM
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
## 2024-03-19 DIAGNOSIS — Z91.81 HISTORY OF FALLING: ICD-10-CM

## 2024-03-25 ENCOUNTER — APPOINTMENT (OUTPATIENT)
Dept: RHEUMATOLOGY | Facility: CLINIC | Age: 69
End: 2024-03-25
Payer: MEDICARE

## 2024-03-25 ENCOUNTER — APPOINTMENT (OUTPATIENT)
Dept: UROLOGY | Facility: CLINIC | Age: 69
End: 2024-03-25

## 2024-03-25 VITALS
TEMPERATURE: 208.22 F | DIASTOLIC BLOOD PRESSURE: 68 MMHG | HEART RATE: 88 BPM | SYSTOLIC BLOOD PRESSURE: 130 MMHG | OXYGEN SATURATION: 95 % | RESPIRATION RATE: 16 BRPM

## 2024-03-25 VITALS
TEMPERATURE: 207.86 F | DIASTOLIC BLOOD PRESSURE: 64 MMHG | HEART RATE: 65 BPM | RESPIRATION RATE: 16 BRPM | OXYGEN SATURATION: 97 % | SYSTOLIC BLOOD PRESSURE: 105 MMHG

## 2024-03-25 VITALS
DIASTOLIC BLOOD PRESSURE: 77 MMHG | SYSTOLIC BLOOD PRESSURE: 122 MMHG | TEMPERATURE: 207.5 F | HEART RATE: 63 BPM | OXYGEN SATURATION: 95 % | RESPIRATION RATE: 16 BRPM

## 2024-03-25 PROCEDURE — 96413 CHEMO IV INFUSION 1 HR: CPT

## 2024-03-25 PROCEDURE — 96415 CHEMO IV INFUSION ADDL HR: CPT

## 2024-03-25 PROCEDURE — 96374 THER/PROPH/DIAG INJ IV PUSH: CPT | Mod: 59

## 2024-03-25 RX ORDER — METHYLPREDNISOLONE SODIUM SUCCINATE 125 MG/2ML
125 INJECTION, POWDER, FOR SOLUTION INTRAMUSCULAR; INTRAVENOUS
Qty: 0 | Refills: 0 | Status: COMPLETED
Start: 2024-01-23

## 2024-03-25 RX ORDER — RITUXIMAB-ABBS 10 MG/ML
500 INJECTION, SOLUTION INTRAVENOUS
Qty: 0 | Refills: 0 | Status: COMPLETED
Start: 2024-01-25

## 2024-03-25 RX ORDER — CETIRIZINE HYDROCHLORIDE 10 MG/1
10 CAPSULE, LIQUID FILLED ORAL
Qty: 0 | Refills: 0 | Status: COMPLETED
Start: 2024-01-23

## 2024-03-25 NOTE — HISTORY OF PRESENT ILLNESS
[6] : 6 [N/A] : N/A [Denies] : Denies [Declined] : Declined [Yes] : Yes [Left upper extremity] : Left upper extremity [24g] : 24g [Start Time: ___] : Medication Start Time: [unfilled] [End Time: ___] : Medication End Time: [unfilled] [Medication Name: ___] : Medication Name: [unfilled] [Total Amount Administered: ___] : Total Amount Administered: [unfilled] [IV discontinued. Intact. No signs or symptoms of IV complications noted. Time: ___] : IV discontinued. Intact. No signs or symptoms of IV complications noted. Time: [unfilled] [Patient  instructed to seek medical attention with signs and symptoms of adverse effects] : Patient  instructed to seek medical attention with signs and symptoms of adverse effects [Patient left unit in no acute distress] : Patient left unit in no acute distress [Medications administered as ordered and tolerated well.] : Medications administered as ordered and tolerated well. [Blood drawn at time of visit] : Blood drawn at time of visit [de-identified] : b/l shoulders, knees, and back [de-identified] : stiffness [de-identified] : Left median cubital vein  [de-identified] : Patient presents for scheduled Truxima infusion, dose 2:2, in NAD. Patient reports tolerated last infusion well and denies any AEs. Today, patient reports pain as rated above. Patient noted with swelling and deformities to bilateral hands, unchanged as per patient. Patient continues to have generalized joint stiffness and moderate daily fatigue. Continues to take Prednisone 5mg daily. Patient denies any recent fever, infection, or surgery. No other symptoms or concerns were verbalized. Infusion titrated as per protocol. Vital signs cycled and stable throughout infusion. In conclusion, patient left the unit in NAD.

## 2024-03-28 ENCOUNTER — APPOINTMENT (OUTPATIENT)
Dept: ORTHOPEDIC SURGERY | Facility: CLINIC | Age: 69
End: 2024-03-28
Payer: MEDICARE

## 2024-03-28 VITALS — BODY MASS INDEX: 26.06 KG/M2 | WEIGHT: 138 LBS | HEIGHT: 61 IN

## 2024-03-28 VITALS — SYSTOLIC BLOOD PRESSURE: 107 MMHG | DIASTOLIC BLOOD PRESSURE: 67 MMHG | HEART RATE: 54 BPM

## 2024-03-28 DIAGNOSIS — S82.024D NONDISPLACED LONGITUDINAL FRACTURE OF RIGHT PATELLA, SUBSEQUENT ENCOUNTER FOR CLOSED FRACTURE WITH ROUTINE HEALING: ICD-10-CM

## 2024-03-28 PROCEDURE — 99214 OFFICE O/P EST MOD 30 MIN: CPT | Mod: 25

## 2024-03-28 PROCEDURE — 73564 X-RAY EXAM KNEE 4 OR MORE: CPT | Mod: RT

## 2024-03-28 NOTE — PHYSICAL EXAM
[de-identified] : Examination of the cervical spine demonstrates no tenderness, no deformity and no muscle spasm. Cervical spine rotation is 60 to the right, 60 to the left, 75 of extension and 45 of flexion. Neurologic exam of the upper extremities reveals intact sensation to light touch. Motor function is 5 over 5 in all groups. Deep tendon reflexes are 2+ and equal at the biceps, triceps and brachioradialis. The right shoulder is clinically located.  There is mild tenderness.  Range of motion demonstrates elevation of 100 degrees and external rotation of 45 degrees.  Elbow motion is pain-free.  Skin is intact.  There is no upper extremity lymphedema. There is no pain with motion of the right hip.  There is minimal tenderness of the right patella.  She has active quadriceps function.  There is no instability of collateral or cruciate ligaments.  Range of motion 0 to 100 degrees with mild pain.  Calves are soft and nontender.  The skin is intact.  There is no lymphedema. [de-identified] : AP, lateral, tunnel and sunrise x-rays of the right knee taken today demonstrate nondisplaced fracture of the patella healing well

## 2024-03-28 NOTE — DISCUSSION/SUMMARY
[de-identified] : The patient has a healing fracture of her right patella.  She will continue to use the patella brace.  She may bear weight to tolerance.  She will be reevaluated with new right knee x-rays in 3 weeks.  In terms of her right shoulder she will start physical therapy to recover from her shoulder dislocation.

## 2024-03-28 NOTE — HISTORY OF PRESENT ILLNESS
[de-identified] : The patient presents for reevaluation of right shoulder s/p dislocation which occurred 3/2/24 and right knee patellar fracture sustained 3/8/24. She has been maintained in a sling and patella brace. She takes Tylenol as needed for pain.

## 2024-04-03 ENCOUNTER — RX RENEWAL (OUTPATIENT)
Age: 69
End: 2024-04-03

## 2024-04-05 ENCOUNTER — APPOINTMENT (OUTPATIENT)
Dept: RHEUMATOLOGY | Facility: CLINIC | Age: 69
End: 2024-04-05
Payer: MEDICARE

## 2024-04-05 VITALS
HEART RATE: 63 BPM | TEMPERATURE: 97.1 F | OXYGEN SATURATION: 98 % | HEIGHT: 61 IN | BODY MASS INDEX: 26.24 KG/M2 | DIASTOLIC BLOOD PRESSURE: 69 MMHG | RESPIRATION RATE: 16 BRPM | WEIGHT: 139 LBS | SYSTOLIC BLOOD PRESSURE: 110 MMHG

## 2024-04-05 DIAGNOSIS — M06.9 RHEUMATOID ARTHRITIS, UNSPECIFIED: ICD-10-CM

## 2024-04-05 DIAGNOSIS — M54.2 CERVICALGIA: ICD-10-CM

## 2024-04-05 PROCEDURE — 99215 OFFICE O/P EST HI 40 MIN: CPT

## 2024-04-05 RX ORDER — PREDNISONE 2.5 MG/1
2.5 TABLET ORAL
Qty: 180 | Refills: 0 | Status: DISCONTINUED | COMMUNITY
Start: 2024-01-17 | End: 2024-04-05

## 2024-04-05 NOTE — REASON FOR VISIT
[Follow-Up: _____] : a [unfilled] follow-up visit [Patient Declined  Services] : - None: Patient declined  services [FreeTextEntry1] : RA, high risk medication use, OA, OP, ILD [Interpreters_IDNumber] : [id number] [Interpreters_FullName] : darling

## 2024-04-05 NOTE — DATA REVIEWED
[FreeTextEntry1] : Laboratory and radiology studies that were personally reviewed at today's visit are summarized below and above: cards (1-):  cont asa, zocor, coreg and zetia  dexa (11-21-22):  Openia (frax 6.8 and 0.9) cervical spine mri (1-): c3-c4 central disc herniation, c5-c6 uncovertebral hypertrophy and c6-c7 disc bulge.  C1/C2 intact neurology note -6-2022 - concern for entrapment - needs ncv cardiology note reviewed from 10-20-21 and significant CAD  Orthopedics notes from 5-2021 reviewed - s/p fracture with good healing  CT chest :  Pulm fibrosis with no progression since imaging 4-2019 Cspine XR :  no c1/c2 disease  DEXA : osteoporosis (spine=-2.5, FN=-2.7, Th = -1.6)  Pulm appt note from 9-2019 reviewed and PFT 9-4-2019 same as 3-2019.   CT CHEST (4-4-2019) Increasing peripheral parenchymal/subpleural fibrosis and honeycombing of lung.  Increased traction bronchiectasis.  No consolidating infiltrate nor developing parenchymal mass.  No evidence of bowel related inflammation nor obstruction.  CXR (6-2018) WNL  DEXA (8-28-18)  FRAX 5.8% and 0.6%   MRI cspine (2018)  Mild multilevel spondylosis of the cervical spine, as described above. The overall appearance of the cervical  spine is similar to MRI of 2013.  EXAM:  CT HEART CALCIUM SCORE                         PROCEDURE DATE:  11/27/2018       INTERPRETATION:  Indication:  Coronary artery disease  History:  Ms. Pavon is a 63-year-old woman with rheumatoid arthritis and  dyslipidemia who reports chest pain.  Acquisitions: Non-contrast prospectively-gated 320-multidtector volumetric computed  tomography heart  Pre-medications: Metoprolol 10 mg intravenous  Quality:  Good  Post-processing:  Images analyzed with [employer] software.   FINDINGS:  Cardiovascular:  Coronary arteries:  Coronary artery calcium Agatston score:   Left main (LM) coronary artery:  0 Left anterior descending (LAD) coronary artery:  371 Left circumflex (LCX) coronary artery:  47 Right coronary artery (RCA):  0 Total:  418  Aorta: Minimal calcification of the aortic root noted.    Pericardium: There is no pericardial effusion.    Chambers: The cardiac chambers are qualitatively normal in size.  Non-cardiac: Bilateral reticular opacities are noted with a slightly  upper lobe predominance. There is peripheral honeycombing as well as mild  traction bronchiectasis. No significant consolidative or nodular  component is noted. In a patient of this age group, this is likely  related to collagen vascular disease. Unremarkable   IMPRESSION:   1.  Severe coronary artery calcium (Agatston score 418) as delineated  above.  The observed calcium score is at percentile 98 for individuals of  the same age, gender, and race/ethnicity who are free of clinical  cardiovascular disease and treated diabetes.

## 2024-04-05 NOTE — ASSESSMENT
[FreeTextEntry1] : JESSA PARK is a 69 year  old female, seen on today  for  # RA: Diagnosed in the early 1990's. significant disease damage burden, pulm fibrosis (ILD) + nodules (chronic) + deformities low disease actitity clinically and shoudler and knee pain likey related to recent falls.  tylenol prn for pain  neck pain is in lateral neck - check CT neck to help evaluate.  Goal of treatment is RA low disease activity significantly better with Rituxan and will re-dose Rituxan approx. Q 6 months.  last rituxan was 3-2024 Current RA medications require blood work Q 3 months to assess for toxicity (bone marrow toxicity, liver toxicity, kidney toxicity) Will check laboratory tests to look for markers of disease activity and also to assess for medication toxicity. Risks of, benefits of and alternatives to this treatment plan discussed with patient and patient expressed understanding. Xrays due  Qgold and Hep screening due 1-2025 DEXA due    # ILD - note from pulm from  reviewed and PFT WNL [] ct chest (3-2023) mild progressive pulm fiborosis [] continue current medications for RA and monitoring for progression of ILD with pulmonary [] pulm note (3-2024) reviewed -minimal progression from 2019  to current and no need for biospy at this time. will have Q 6 months pulm eval   #OA [] tylenol prn  # Osteoporosis seen on DEXA  and osteopenia on dexa in  [] check vitamin D today [] Had reclast 3-2021 and 4/25/22 and 6- [] continue calcium  [] next reclast after 6-  #HCM - Shingrx 1 in dec 2020 and # 2 in april 2021   More than 50% of the encounter was spent counseling JESSA PARK on the differential, workup, disease course, and treatment/management.   Education was provided to JESSA PARK during this encounter. All questions and concerns were answered and addressed in detail.  JESSA PARK verbalized understanding and agreed to the plan.   JESSA PARK has been instructed to call for an earlier appointment if new symptoms develop in the interim. JESSA PARK has been instructed to make a follow-up appointment in 2 months

## 2024-04-05 NOTE — PHYSICAL EXAM
[General Appearance - Alert] : alert [General Appearance - In No Acute Distress] : in no acute distress [General Appearance - Well Nourished] : well nourished [General Appearance - Well Developed] : well developed [General Appearance - Well-Appearing] : healthy appearing [Sclera] : the sclera and conjunctiva were normal [Extraocular Movements] : extraocular movements were intact [No CVA Tenderness] : no ~M costovertebral angle tenderness [Skin Color & Pigmentation] : normal skin color and pigmentation [Skin Turgor] : normal skin turgor [] : no rash [Sensation] : the sensory exam was normal to light touch and pinprick [Motor Exam] : the motor exam was normal [No Focal Deficits] : no focal deficits [Oriented To Time, Place, And Person] : oriented to person, place, and time [Impaired Insight] : insight and judgment were intact [Affect] : the affect was normal [No Spinal Tenderness] : no spinal tenderness [FreeTextEntry1] :  chronic skin changes.

## 2024-04-05 NOTE — HISTORY OF PRESENT ILLNESS
[CCP] : Cyclic citrullinated peptide (CCP) antibody [Erosions] : erosions [Interstitial Lung Disease] : interstitial lung disease [Shortness of Breath] : shortness of breath [Joint Swelling] : joint swelling [Joint Pain] : joint pain [Fatigue] : fatigue [FreeTextEntry1] : JESSA PARK is a 69 year  old female, seen on today  for RA with ILD OA  OP:  dexa in  with openia and low frax but DEXA from  with OSTEOPOROSIS     Recent fracture of right patella (ortho note 3-2024)  fell 1 time in her bedroom in the past  month in her slippers and she fell 1 time at [address] because something was on the floow.  one fall she fracrured the right patella and hte other she dislocated her right shoudler today she has right shoudler pain and right knee pain  she had rituxan in 3-2024  she feels that rainy weather like this week makes her knee and shoulders painful.   the knee and shoulder and neck are the main sites of pain today but the knee and shoulder were affected by recent falls.  neck pain - denies trauma, she has chronic neck pain.   [Rash] : no rash [Ocular Symptoms] : no ocular symptoms [Morning Stiffness] : no morning stiffness [TextBox_2] : 1990's [TextBox_38] : MTX [TextBox_42] : humira, enbrel, remicade (2013-208), simponi (2018 x 2 doses), Xeljanz (6-2018-> 4/2022);  Orencia (4-2022 -> 8-2022) [TextBox_44] : Rituxan (most recent 3-2024)

## 2024-04-09 LAB
25(OH)D3 SERPL-MCNC: 31.2 NG/ML
ALBUMIN SERPL ELPH-MCNC: 4 G/DL
ALP BLD-CCNC: 71 U/L
ALT SERPL-CCNC: 13 U/L
ANION GAP SERPL CALC-SCNC: 10 MMOL/L
AST SERPL-CCNC: 18 U/L
BASOPHILS # BLD AUTO: 0.05 K/UL
BASOPHILS NFR BLD AUTO: 0.6 %
BILIRUB SERPL-MCNC: 0.2 MG/DL
BUN SERPL-MCNC: 20 MG/DL
CALCIUM SERPL-MCNC: 9.3 MG/DL
CD16+CD56+ CELLS # BLD: 190 CELLS/UL
CD16+CD56+ CELLS NFR BLD: 14 %
CD19 CELLS NFR BLD: 0 CELLS/UL
CD3 CELLS # BLD: 1195 CELLS/UL
CD3 CELLS NFR BLD: 85 %
CD3+CD4+ CELLS # BLD: 862 CELLS/UL
CD3+CD4+ CELLS NFR BLD: 59 %
CD3+CD4+ CELLS/CD3+CD8+ CLL SPEC: 2.27 RATIO
CD3+CD8+ CELLS # SPEC: 380 CELLS/UL
CD3+CD8+ CELLS NFR BLD: 26 %
CELLS.CD3-CD19+/CELLS IN BLOOD: <1 %
CHLORIDE SERPL-SCNC: 101 MMOL/L
CO2 SERPL-SCNC: 26 MMOL/L
CREAT SERPL-MCNC: 0.81 MG/DL
CRP SERPL-MCNC: <3 MG/L
EGFR: 79 ML/MIN/1.73M2
EOSINOPHIL # BLD AUTO: 0.59 K/UL
EOSINOPHIL NFR BLD AUTO: 7.5 %
ERYTHROCYTE [SEDIMENTATION RATE] IN BLOOD BY WESTERGREN METHOD: 48 MM/HR
GLUCOSE SERPL-MCNC: 108 MG/DL
HCT VFR BLD CALC: 39.4 %
HGB BLD-MCNC: 12.6 G/DL
IGA SER QL IEP: 451 MG/DL
IGG SER QL IEP: 1408 MG/DL
IGM SER QL IEP: 40 MG/DL
IMM GRANULOCYTES NFR BLD AUTO: 0.1 %
LYMPHOCYTES # BLD AUTO: 1.56 K/UL
LYMPHOCYTES NFR BLD AUTO: 19.9 %
MAN DIFF?: NORMAL
MCHC RBC-ENTMCNC: 29 PG
MCHC RBC-ENTMCNC: 32 GM/DL
MCV RBC AUTO: 90.6 FL
MONOCYTES # BLD AUTO: 0.71 K/UL
MONOCYTES NFR BLD AUTO: 9.1 %
NEUTROPHILS # BLD AUTO: 4.92 K/UL
NEUTROPHILS NFR BLD AUTO: 62.8 %
PLATELET # BLD AUTO: 257 K/UL
POTASSIUM SERPL-SCNC: 4.3 MMOL/L
PROT SERPL-MCNC: 7 G/DL
RBC # BLD: 4.35 M/UL
RBC # FLD: 13.5 %
SODIUM SERPL-SCNC: 137 MMOL/L
WBC # FLD AUTO: 7.84 K/UL

## 2024-04-10 ENCOUNTER — APPOINTMENT (OUTPATIENT)
Dept: CARDIOLOGY | Facility: CLINIC | Age: 69
End: 2024-04-10

## 2024-04-22 ENCOUNTER — APPOINTMENT (OUTPATIENT)
Dept: ORTHOPEDIC SURGERY | Facility: CLINIC | Age: 69
End: 2024-04-22

## 2024-04-24 ENCOUNTER — APPOINTMENT (OUTPATIENT)
Dept: CT IMAGING | Facility: CLINIC | Age: 69
End: 2024-04-24
Payer: MEDICAID

## 2024-04-24 ENCOUNTER — OUTPATIENT (OUTPATIENT)
Dept: OUTPATIENT SERVICES | Facility: HOSPITAL | Age: 69
LOS: 1 days | End: 2024-04-24
Payer: MEDICAID

## 2024-04-24 DIAGNOSIS — M54.2 CERVICALGIA: ICD-10-CM

## 2024-04-24 DIAGNOSIS — Z98.1 ARTHRODESIS STATUS: Chronic | ICD-10-CM

## 2024-04-24 DIAGNOSIS — Z00.8 ENCOUNTER FOR OTHER GENERAL EXAMINATION: ICD-10-CM

## 2024-04-24 DIAGNOSIS — Z98.890 OTHER SPECIFIED POSTPROCEDURAL STATES: Chronic | ICD-10-CM

## 2024-04-24 PROCEDURE — 70490 CT SOFT TISSUE NECK W/O DYE: CPT

## 2024-04-24 PROCEDURE — 70490 CT SOFT TISSUE NECK W/O DYE: CPT | Mod: 26

## 2024-04-29 ENCOUNTER — NON-APPOINTMENT (OUTPATIENT)
Age: 69
End: 2024-04-29

## 2024-05-29 ENCOUNTER — RX RENEWAL (OUTPATIENT)
Age: 69
End: 2024-05-29

## 2024-05-29 RX ORDER — PREDNISONE 2.5 MG/1
2.5 TABLET ORAL
Qty: 60 | Refills: 0 | Status: ACTIVE | COMMUNITY
Start: 2021-07-02 | End: 1900-01-01

## 2024-07-08 ENCOUNTER — OUTPATIENT (OUTPATIENT)
Dept: OUTPATIENT SERVICES | Facility: HOSPITAL | Age: 69
LOS: 1 days | End: 2024-07-08
Payer: MEDICARE

## 2024-07-08 ENCOUNTER — APPOINTMENT (OUTPATIENT)
Dept: INTERNAL MEDICINE | Facility: CLINIC | Age: 69
End: 2024-07-08
Payer: MEDICARE

## 2024-07-08 VITALS
HEIGHT: 61 IN | WEIGHT: 144 LBS | SYSTOLIC BLOOD PRESSURE: 124 MMHG | HEART RATE: 72 BPM | OXYGEN SATURATION: 97 % | BODY MASS INDEX: 27.19 KG/M2 | DIASTOLIC BLOOD PRESSURE: 80 MMHG

## 2024-07-08 DIAGNOSIS — H93.12 TINNITUS, LEFT EAR: ICD-10-CM

## 2024-07-08 DIAGNOSIS — Z78.9 OTHER SPECIFIED HEALTH STATUS: ICD-10-CM

## 2024-07-08 DIAGNOSIS — R10.813 RIGHT LOWER QUADRANT ABDOMINAL TENDERNESS: ICD-10-CM

## 2024-07-08 DIAGNOSIS — R73.01 IMPAIRED FASTING GLUCOSE: ICD-10-CM

## 2024-07-08 DIAGNOSIS — I10 ESSENTIAL (PRIMARY) HYPERTENSION: ICD-10-CM

## 2024-07-08 DIAGNOSIS — I25.10 ATHEROSCLEROTIC HEART DISEASE OF NATIVE CORONARY ARTERY W/OUT ANGINA PECTORIS: ICD-10-CM

## 2024-07-08 DIAGNOSIS — I01.1 ACUTE RHEUMATIC ENDOCARDITIS: ICD-10-CM

## 2024-07-08 DIAGNOSIS — I70.0 ATHEROSCLEROSIS OF AORTA: ICD-10-CM

## 2024-07-08 DIAGNOSIS — Z98.1 ARTHRODESIS STATUS: Chronic | ICD-10-CM

## 2024-07-08 DIAGNOSIS — Z98.890 OTHER SPECIFIED POSTPROCEDURAL STATES: Chronic | ICD-10-CM

## 2024-07-08 DIAGNOSIS — R10.31 RIGHT LOWER QUADRANT PAIN: ICD-10-CM

## 2024-07-08 PROCEDURE — G0463: CPT

## 2024-07-08 PROCEDURE — 99214 OFFICE O/P EST MOD 30 MIN: CPT

## 2024-07-09 LAB
ESTIMATED AVERAGE GLUCOSE: 123 MG/DL
HBA1C MFR BLD HPLC: 5.9 %

## 2024-07-10 ENCOUNTER — APPOINTMENT (OUTPATIENT)
Dept: GASTROENTEROLOGY | Facility: CLINIC | Age: 69
End: 2024-07-10

## 2024-07-10 VITALS
OXYGEN SATURATION: 97 % | HEART RATE: 71 BPM | BODY MASS INDEX: 27.19 KG/M2 | SYSTOLIC BLOOD PRESSURE: 126 MMHG | WEIGHT: 144 LBS | DIASTOLIC BLOOD PRESSURE: 76 MMHG | HEIGHT: 61 IN

## 2024-07-12 ENCOUNTER — APPOINTMENT (OUTPATIENT)
Dept: RHEUMATOLOGY | Facility: CLINIC | Age: 69
End: 2024-07-12
Payer: MEDICARE

## 2024-07-12 ENCOUNTER — APPOINTMENT (OUTPATIENT)
Dept: RADIOLOGY | Facility: CLINIC | Age: 69
End: 2024-07-12
Payer: MEDICARE

## 2024-07-12 ENCOUNTER — OUTPATIENT (OUTPATIENT)
Dept: OUTPATIENT SERVICES | Facility: HOSPITAL | Age: 69
LOS: 1 days | End: 2024-07-12
Payer: MEDICARE

## 2024-07-12 VITALS
SYSTOLIC BLOOD PRESSURE: 143 MMHG | HEART RATE: 59 BPM | DIASTOLIC BLOOD PRESSURE: 81 MMHG | OXYGEN SATURATION: 97 % | WEIGHT: 141 LBS | BODY MASS INDEX: 26.62 KG/M2 | RESPIRATION RATE: 16 BRPM | HEIGHT: 61 IN

## 2024-07-12 DIAGNOSIS — M06.9 RHEUMATOID ARTHRITIS, UNSPECIFIED: ICD-10-CM

## 2024-07-12 DIAGNOSIS — M81.0 AGE-RELATED OSTEOPOROSIS W/OUT CURRENT PATHOLOGICAL FRACTURE: ICD-10-CM

## 2024-07-12 PROCEDURE — 71046 X-RAY EXAM CHEST 2 VIEWS: CPT | Mod: 26

## 2024-07-12 PROCEDURE — 72100 X-RAY EXAM L-S SPINE 2/3 VWS: CPT

## 2024-07-12 PROCEDURE — 73030 X-RAY EXAM OF SHOULDER: CPT | Mod: 26,RT

## 2024-07-12 PROCEDURE — 73030 X-RAY EXAM OF SHOULDER: CPT

## 2024-07-12 PROCEDURE — 71046 X-RAY EXAM CHEST 2 VIEWS: CPT

## 2024-07-12 PROCEDURE — 72100 X-RAY EXAM L-S SPINE 2/3 VWS: CPT | Mod: 26

## 2024-07-12 PROCEDURE — 99214 OFFICE O/P EST MOD 30 MIN: CPT

## 2024-07-12 RX ADMIN — ZOLEDRONIC ACID 0 MG/100ML: 5 INJECTION, SOLUTION INTRAVENOUS at 00:00

## 2024-07-16 DIAGNOSIS — I70.0 ATHEROSCLEROSIS OF AORTA: ICD-10-CM

## 2024-07-16 DIAGNOSIS — I25.10 ATHEROSCLEROTIC HEART DISEASE OF NATIVE CORONARY ARTERY WITHOUT ANGINA PECTORIS: ICD-10-CM

## 2024-07-16 DIAGNOSIS — R73.01 IMPAIRED FASTING GLUCOSE: ICD-10-CM

## 2024-07-16 DIAGNOSIS — R10.31 RIGHT LOWER QUADRANT PAIN: ICD-10-CM

## 2024-07-17 ENCOUNTER — RESULT REVIEW (OUTPATIENT)
Age: 69
End: 2024-07-17

## 2024-07-17 ENCOUNTER — APPOINTMENT (OUTPATIENT)
Dept: ULTRASOUND IMAGING | Facility: CLINIC | Age: 69
End: 2024-07-17
Payer: MEDICARE

## 2024-07-17 ENCOUNTER — OUTPATIENT (OUTPATIENT)
Dept: OUTPATIENT SERVICES | Facility: HOSPITAL | Age: 69
LOS: 1 days | End: 2024-07-17
Payer: MEDICARE

## 2024-07-17 ENCOUNTER — APPOINTMENT (OUTPATIENT)
Dept: CT IMAGING | Facility: CLINIC | Age: 69
End: 2024-07-17
Payer: MEDICARE

## 2024-07-17 DIAGNOSIS — R10.813 RIGHT LOWER QUADRANT ABDOMINAL TENDERNESS: ICD-10-CM

## 2024-07-17 DIAGNOSIS — Z98.890 OTHER SPECIFIED POSTPROCEDURAL STATES: Chronic | ICD-10-CM

## 2024-07-17 DIAGNOSIS — I70.0 ATHEROSCLEROSIS OF AORTA: ICD-10-CM

## 2024-07-17 DIAGNOSIS — Z98.1 ARTHRODESIS STATUS: Chronic | ICD-10-CM

## 2024-07-17 DIAGNOSIS — Z00.8 ENCOUNTER FOR OTHER GENERAL EXAMINATION: ICD-10-CM

## 2024-07-17 PROCEDURE — 74177 CT ABD & PELVIS W/CONTRAST: CPT | Mod: 26

## 2024-07-17 PROCEDURE — 74177 CT ABD & PELVIS W/CONTRAST: CPT

## 2024-07-17 PROCEDURE — 76775 US EXAM ABDO BACK WALL LIM: CPT | Mod: 26

## 2024-07-17 PROCEDURE — 76775 US EXAM ABDO BACK WALL LIM: CPT

## 2024-07-19 ENCOUNTER — APPOINTMENT (OUTPATIENT)
Dept: MRI IMAGING | Facility: CLINIC | Age: 69
End: 2024-07-19

## 2024-07-19 ENCOUNTER — OUTPATIENT (OUTPATIENT)
Dept: OUTPATIENT SERVICES | Facility: HOSPITAL | Age: 69
LOS: 1 days | End: 2024-07-19
Payer: MEDICARE

## 2024-07-19 DIAGNOSIS — Z98.890 OTHER SPECIFIED POSTPROCEDURAL STATES: Chronic | ICD-10-CM

## 2024-07-19 DIAGNOSIS — Z98.1 ARTHRODESIS STATUS: Chronic | ICD-10-CM

## 2024-07-19 DIAGNOSIS — M06.9 RHEUMATOID ARTHRITIS, UNSPECIFIED: ICD-10-CM

## 2024-07-19 PROCEDURE — 73721 MRI JNT OF LWR EXTRE W/O DYE: CPT

## 2024-07-19 PROCEDURE — 73721 MRI JNT OF LWR EXTRE W/O DYE: CPT | Mod: 26,RT

## 2024-07-20 ENCOUNTER — NON-APPOINTMENT (OUTPATIENT)
Age: 69
End: 2024-07-20

## 2024-07-22 LAB
25(OH)D3 SERPL-MCNC: 29.9 NG/ML
ALBUMIN SERPL ELPH-MCNC: 4.4 G/DL
ALP BLD-CCNC: 61 U/L
ALT SERPL-CCNC: 13 U/L
ANION GAP SERPL CALC-SCNC: 12 MMOL/L
AST SERPL-CCNC: 20 U/L
BASOPHILS # BLD AUTO: 0.05 K/UL
BASOPHILS NFR BLD AUTO: 0.6 %
BILIRUB SERPL-MCNC: 0.4 MG/DL
BUN SERPL-MCNC: 16 MG/DL
CALCIUM SERPL-MCNC: 9.4 MG/DL
CD16+CD56+ CELLS # BLD: 168 CELLS/UL
CD16+CD56+ CELLS NFR BLD: 11 %
CD19 CELLS NFR BLD: 0 CELLS/UL
CD3 CELLS NFR BLD: 88 %
CD3+CD4+ CELLS # BLD: 927 CELLS/UL
CD3+CD4+ CELLS NFR BLD: 61 %
CD3+CD4+ CELLS/CD3+CD8+ CLL SPEC: 2.23 RATIO
CD3+CD8+ CELLS NFR BLD: 27 %
CELLS.CD3-CD19+/CELLS IN BLOOD: <1 %
CHLORIDE SERPL-SCNC: 102 MMOL/L
CO2 SERPL-SCNC: 26 MMOL/L
CREAT SERPL-MCNC: 0.82 MG/DL
CRP SERPL-MCNC: <3 MG/L
EGFR: 77 ML/MIN/1.73M2
EOSINOPHIL # BLD AUTO: 0.34 K/UL
EOSINOPHIL NFR BLD AUTO: 4.4 %
ERYTHROCYTE [SEDIMENTATION RATE] IN BLOOD BY WESTERGREN METHOD: 35 MM/HR
GLUCOSE SERPL-MCNC: 89 MG/DL
HCT VFR BLD CALC: 43.7 %
HGB BLD-MCNC: 13.9 G/DL
IGA SER QL IEP: 452 MG/DL
IGG SER QL IEP: 1580 MG/DL
IGM SER QL IEP: 41 MG/DL
IMM GRANULOCYTES NFR BLD AUTO: 0.3 %
LYMPHOCYTES # BLD AUTO: 1.56 K/UL
LYMPHOCYTES NFR BLD AUTO: 20.2 %
MAN DIFF?: NORMAL
MCHC RBC-ENTMCNC: 28.9 PG
MCHC RBC-ENTMCNC: 31.8 GM/DL
MCV RBC AUTO: 90.9 FL
NEUTROPHILS # BLD AUTO: 5.07 K/UL
NEUTROPHILS NFR BLD AUTO: 65.7 %
PLATELET # BLD AUTO: 249 K/UL
POTASSIUM SERPL-SCNC: 4.5 MMOL/L
RBC # BLD: 4.81 M/UL
RBC # FLD: 12.9 %
SODIUM SERPL-SCNC: 139 MMOL/L
WBC # FLD AUTO: 7.72 K/UL

## 2024-07-25 ENCOUNTER — APPOINTMENT (OUTPATIENT)
Dept: CARDIOLOGY | Facility: CLINIC | Age: 69
End: 2024-07-25
Payer: MEDICARE

## 2024-07-25 ENCOUNTER — NON-APPOINTMENT (OUTPATIENT)
Age: 69
End: 2024-07-25

## 2024-07-25 VITALS
OXYGEN SATURATION: 98 % | HEIGHT: 61 IN | HEART RATE: 64 BPM | DIASTOLIC BLOOD PRESSURE: 77 MMHG | BODY MASS INDEX: 26.43 KG/M2 | SYSTOLIC BLOOD PRESSURE: 128 MMHG | WEIGHT: 140 LBS

## 2024-07-25 DIAGNOSIS — R07.9 CHEST PAIN, UNSPECIFIED: ICD-10-CM

## 2024-07-25 DIAGNOSIS — I70.0 ATHEROSCLEROSIS OF AORTA: ICD-10-CM

## 2024-07-25 PROCEDURE — G2211 COMPLEX E/M VISIT ADD ON: CPT | Mod: NC

## 2024-07-25 PROCEDURE — 99214 OFFICE O/P EST MOD 30 MIN: CPT | Mod: 25

## 2024-07-25 NOTE — HISTORY OF PRESENT ILLNESS
[FreeTextEntry1] : 7/25/2024   Had colonoscopy and endoscopy 3 months ago without issues VATS was not done She is seeing rheumatolgy - Dr. 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   She is having a pressure in the left chest  She is able to walk 1 hour.   CT abd/pelvis showed no AAA US abdominal aorta was ok   Meds: aspirin 81 Atrovastatin 20 Carvedilol 3.125mg BID Metformin Pantoprazole    1/10/2024  Needs colonoscpy / EGD with Dr. Mohamud Also seeing Zeltzman and Napalitano - may need VATS with biopsy   BP and HR well controlled   She is able to walk up 2 flights of stairs and is able to walk 1 hour.    Echo had an echocardiogram December 2023:  Normal LV , min AI, min MR, min PT, thinning of interatiral septum.  ECG Jan 5th - normal .    Coronary CTA in 2021 - mild to moderate CAD    4/5/2023  She was seen by ortho Needs surgery on the right hand. Dr. Stevenson.  Surgery date is april 18th. This will be done in [facility] She lives on second floor and can walk up a flight of stairs. BP and HR well controlled She always has chest pain with movement of her arms.     10/12/2022  She has point tenderness to palpation of the ant chest wall and left post chest wall no sob worse with movement of her arms BP and HR well controlled no exretional cp Echo was NORMAL in June 4/6/2022 She complains of chest pressure Back pains Tired  If she moves her arms and shoulders she has pain in the chest and the arms Hard to sleep on the left side, She has a tightness in the area Fatigue with exertion Yesterday she cleaned her bathroom and had pain in the back No leg swelling.  Exertion does not cause chest pain. She does get tired with exertion.  She needs to have a humira infusion Seen by Dr. Pacheco recently  Xeljanz is off   Medications: Famotidine Prednisone 2.5 mg daily  Meclizine  Coreg 3.125mg BID atorvastatin 80 Folic acid Aspirin 81mg daily    10/20/2021 Doing ok, no exertional chest pain or sob Muscles are aching her in the chest and the arm no leg swelling She is on aspirin and statin therapy BP is well controlled   CT heart coronary 9/1/2021 Mid LAD 50% Cx 30%  Echo:  EF 63%, mild MR, normal LV size and function, borderline pHTN  Medications: Xeljanz Famotidine Prednisone Meclizine  Coreg 3.125mg BID atorvastatin 40 Folic acid Aspirin 81mg daily

## 2024-07-25 NOTE — ASSESSMENT
[FreeTextEntry1] : Assessment: 1. Coronary Atherosclerosis 2. Chest pressure - some improvement with coreg 3. + Calcium score 4. Family history of CAD 5. RA 6. Anxiety.    Plan: 1. Continue medical therapy for now with: - antiplatelet - aspirin - statin Atorvastatin 20mg daily - beta blocker - coreg - She does not know if she is taking zetia.   2.  She is having chest pressures , last coronary CTA was in 2021, will repeat to assure no progression of CAD 3. US abdominal aorta july 2024 was normal, also was ok on CT A/P 4.  Await testing.  5,  RTC in 6 months, sooner if CTA is abnormal.

## 2024-07-25 NOTE — HISTORY OF PRESENT ILLNESS
[FreeTextEntry1] : 7/25/2024   Had colonoscopy and endoscopy 3 months ago without issues VATS was not done She is seeing rheumatolgy - Dr. 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   She is having a pressure in the left chest  She is able to walk 1 hour.   CT abd/pelvis showed no AAA US abdominal aorta was ok   Meds: aspirin 81 Atrovastatin 20 Carvedilol 3.125mg BID Metformin Pantoprazole    1/10/2024  Needs colonoscpy / EGD with Dr. Mohamud Also seeing Zeltzman and Napalitano - may need VATS with biopsy   BP and HR well controlled   She is able to walk up 2 flights of stairs and is able to walk 1 hour.    Echo had an echocardiogram December 2023:  Normal LV , min AI, min MR, min PT, thinning of interatiral septum.  ECG Jan 5th - normal .    Coronary CTA in 2021 - mild to moderate CAD    4/5/2023  She was seen by ortho Needs surgery on the right hand. Dr. Stevenson.  Surgery date is april 18th. This will be done in [facility] She lives on second floor and can walk up a flight of stairs. BP and HR well controlled She always has chest pain with movement of her arms.     10/12/2022  She has point tenderness to palpation of the ant chest wall and left post chest wall no sob worse with movement of her arms BP and HR well controlled no exretional cp Echo was NORMAL in June 4/6/2022 She complains of chest pressure Back pains Tired  If she moves her arms and shoulders she has pain in the chest and the arms Hard to sleep on the left side, She has a tightness in the area Fatigue with exertion Yesterday she cleaned her bathroom and had pain in the back No leg swelling.  Exertion does not cause chest pain. She does get tired with exertion.  She needs to have a humira infusion Seen by Dr. Pacheco recently  Xeljanz is off   Medications: Famotidine Prednisone 2.5 mg daily  Meclizine  Coreg 3.125mg BID atorvastatin 80 Folic acid Aspirin 81mg daily    10/20/2021 Doing ok, no exertional chest pain or sob Muscles are aching her in the chest and the arm no leg swelling She is on aspirin and statin therapy BP is well controlled   CT heart coronary 9/1/2021 Mid LAD 50% Cx 30%  Echo:  EF 63%, mild MR, normal LV size and function, borderline pHTN  Medications: Xeljanz Famotidine Prednisone Meclizine  Coreg 3.125mg BID atorvastatin 40 Folic acid Aspirin 81mg daily

## 2024-07-29 ENCOUNTER — APPOINTMENT (OUTPATIENT)
Dept: GASTROENTEROLOGY | Facility: CLINIC | Age: 69
End: 2024-07-29
Payer: MEDICARE

## 2024-07-29 ENCOUNTER — RESULT REVIEW (OUTPATIENT)
Age: 69
End: 2024-07-29

## 2024-07-29 VITALS
OXYGEN SATURATION: 98 % | WEIGHT: 136 LBS | DIASTOLIC BLOOD PRESSURE: 72 MMHG | HEART RATE: 57 BPM | HEIGHT: 61 IN | BODY MASS INDEX: 25.68 KG/M2 | SYSTOLIC BLOOD PRESSURE: 122 MMHG

## 2024-07-29 DIAGNOSIS — R19.7 DIARRHEA, UNSPECIFIED: ICD-10-CM

## 2024-07-29 DIAGNOSIS — M25.561 PAIN IN RIGHT KNEE: ICD-10-CM

## 2024-07-29 DIAGNOSIS — G89.29 PAIN IN RIGHT KNEE: ICD-10-CM

## 2024-07-29 PROCEDURE — 99214 OFFICE O/P EST MOD 30 MIN: CPT

## 2024-07-29 RX ORDER — SUCRALFATE 1 G/1
1 TABLET ORAL
Qty: 60 | Refills: 2 | Status: ACTIVE | COMMUNITY
Start: 2024-07-29 | End: 1900-01-01

## 2024-07-29 NOTE — PHYSICAL EXAM
[Alert] : alert [Healthy Appearing] : healthy appearing [Sclera] : the sclera and conjunctiva were normal [Hearing Threshold Finger Rub Not Comal] : hearing was normal [No Respiratory Distress] : no respiratory distress [Auscultation Breath Sounds / Voice Sounds] : lungs were clear to auscultation bilaterally [Normal S1, S2] : normal S1 and S2 [Bowel Sounds] : normal bowel sounds [Abdomen Tenderness] : non-tender [Abdomen Soft] : soft [No CVA Tenderness] : no CVA  tenderness [Abnormal Walk] : normal gait [] : no rash [No Focal Deficits] : no focal deficits [Oriented To Time, Place, And Person] : oriented to person, place, and time

## 2024-07-29 NOTE — PHYSICAL EXAM
[Alert] : alert [Healthy Appearing] : healthy appearing [Sclera] : the sclera and conjunctiva were normal [Hearing Threshold Finger Rub Not Tripp] : hearing was normal [No Respiratory Distress] : no respiratory distress [Auscultation Breath Sounds / Voice Sounds] : lungs were clear to auscultation bilaterally [Normal S1, S2] : normal S1 and S2 [Bowel Sounds] : normal bowel sounds [Abdomen Tenderness] : non-tender [Abdomen Soft] : soft [No CVA Tenderness] : no CVA  tenderness [Abnormal Walk] : normal gait [] : no rash [No Focal Deficits] : no focal deficits [Oriented To Time, Place, And Person] : oriented to person, place, and time

## 2024-08-01 LAB
ADENOVIRUS F 40/41: NOT DETECTED
ASTROVIRUS: NOT DETECTED
CAMPYLOBACTER: NOT DETECTED
CDIFF BY PCR: NOT DETECTED
CRYPTOSPORIDIUM: NOT DETECTED
CYCLOSPORA: NOT DETECTED
ENTAMOEBA HISTOLYTICA: NOT DETECTED
ENTEROAGGREGATIVE E. COLI (EAEC): NOT DETECTED
ENTEROPATHOGENIC E. COLI (EPEC): NOT DETECTED
ENTEROTOXIGENIC E.COLI (ETEC) LT/ST: NOT DETECTED
GI PCR PANEL: ABNORMAL
GIARDIA LAMBLIA: NOT DETECTED
NOROVIRUS GI/GII: ABNORMAL
PLESIOMONAS SHIGELLOIDES: NOT DETECTED
ROTAVIRUS A: NOT DETECTED
SALMONELLA: NOT DETECTED
SAPOVIRUS (GENOGROUPS I, II, IV, AND V): NOT DETECTED
SHIGA-LIKE TOXIN-PRODUCING E.COLI (STEC) STX1/STX2: NOT DETECTED
SHIGELLA/ ENTEROINVASIVE E. COLI: NOT DETECTED
VIBRIO CHOLERAE: NOT DETECTED
VIBRIO: NOT DETECTED
YERSINIA ENTEROCOLITICA: NOT DETECTED

## 2024-08-01 NOTE — HISTORY OF PRESENT ILLNESS
[FreeTextEntry1] : 68 yo female wtih c/o GERD after eating salad with vinegar with diarrhea  3 weeks ago. ppi not helping. Last bm 2 hours ago diarrhea, mucous,foul smelling stool no brbpr, no melena. no abdominal pain, no weight loss  s/p egd/colonoscopy in 2024 diverticulosis, gastritis, path negative

## 2024-08-01 NOTE — HISTORY OF PRESENT ILLNESS
[FreeTextEntry1] : 70 yo female wtih c/o GERD after eating salad with vinegar with diarrhea  3 weeks ago. ppi not helping. Last bm 2 hours ago diarrhea, mucous,foul smelling stool no brbpr, no melena. no abdominal pain, no weight loss  s/p egd/colonoscopy in 2024 diverticulosis, gastritis, path negative

## 2024-08-01 NOTE — REVIEW OF SYSTEMS
[As Noted in HPI] : as noted in HPI [Fever] : no fever [Chills] : no chills [Red Eyes] : eyes not red [Sore Throat] : no sore throat [Chest Pain] : no chest pain [SOB on Exertion] : no shortness of breath during exertion [Rash] : no rash [Joint Stiffness] : no joint stiffness [Skin Wound] : no skin wound [Dizziness] : no dizziness [Muscle Weakness] : no muscle weakness [Easy Bruising] : no tendency for easy bruising

## 2024-08-01 NOTE — ASSESSMENT
[FreeTextEntry1] : 1. acute diarrhea  plan check stool cultures  2. refractory gerd to ppi  plan carafate bid antireflux diet

## 2024-08-03 ENCOUNTER — OUTPATIENT (OUTPATIENT)
Dept: OUTPATIENT SERVICES | Facility: HOSPITAL | Age: 69
LOS: 1 days | End: 2024-08-03
Payer: MEDICARE

## 2024-08-03 ENCOUNTER — APPOINTMENT (OUTPATIENT)
Dept: CT IMAGING | Facility: CLINIC | Age: 69
End: 2024-08-03
Payer: MEDICARE

## 2024-08-03 DIAGNOSIS — M25.561 PAIN IN RIGHT KNEE: ICD-10-CM

## 2024-08-03 DIAGNOSIS — Z98.890 OTHER SPECIFIED POSTPROCEDURAL STATES: Chronic | ICD-10-CM

## 2024-08-03 DIAGNOSIS — Z98.1 ARTHRODESIS STATUS: Chronic | ICD-10-CM

## 2024-08-03 PROCEDURE — 73700 CT LOWER EXTREMITY W/O DYE: CPT | Mod: 26,RT

## 2024-08-03 PROCEDURE — 76377 3D RENDER W/INTRP POSTPROCES: CPT | Mod: 26

## 2024-08-03 PROCEDURE — 76377 3D RENDER W/INTRP POSTPROCES: CPT

## 2024-08-03 PROCEDURE — 73700 CT LOWER EXTREMITY W/O DYE: CPT

## 2024-08-14 ENCOUNTER — NON-APPOINTMENT (OUTPATIENT)
Age: 69
End: 2024-08-14

## 2024-08-15 ENCOUNTER — APPOINTMENT (OUTPATIENT)
Dept: OTOLARYNGOLOGY | Facility: CLINIC | Age: 69
End: 2024-08-15
Payer: MEDICARE

## 2024-08-15 ENCOUNTER — NON-APPOINTMENT (OUTPATIENT)
Age: 69
End: 2024-08-15

## 2024-08-15 VITALS
DIASTOLIC BLOOD PRESSURE: 68 MMHG | HEIGHT: 61 IN | HEART RATE: 53 BPM | WEIGHT: 134.13 LBS | BODY MASS INDEX: 25.32 KG/M2 | TEMPERATURE: 98 F | SYSTOLIC BLOOD PRESSURE: 106 MMHG

## 2024-08-15 DIAGNOSIS — H92.09 OTALGIA, UNSPECIFIED EAR: ICD-10-CM

## 2024-08-15 DIAGNOSIS — H61.23 IMPACTED CERUMEN, BILATERAL: ICD-10-CM

## 2024-08-15 DIAGNOSIS — R42 DIZZINESS AND GIDDINESS: ICD-10-CM

## 2024-08-15 DIAGNOSIS — J31.0 CHRONIC RHINITIS: ICD-10-CM

## 2024-08-15 DIAGNOSIS — J34.2 DEVIATED NASAL SEPTUM: ICD-10-CM

## 2024-08-15 PROCEDURE — 99214 OFFICE O/P EST MOD 30 MIN: CPT | Mod: 25

## 2024-08-15 PROCEDURE — 31231 NASAL ENDOSCOPY DX: CPT

## 2024-08-15 RX ORDER — ASPIRIN 81 MG
6.5 TABLET, DELAYED RELEASE (ENTERIC COATED) ORAL
Qty: 1 | Refills: 5 | Status: ACTIVE | COMMUNITY
Start: 2024-08-15 | End: 1900-01-01

## 2024-08-15 NOTE — ASSESSMENT
[FreeTextEntry1] : wax- Rx:  Debrox was prescribed and  is to be placed in both ears on a routine basis to keep them free of wax. Routine debridement suggested to keep the ears free of wax.  DNS and Rhinitis Rx   Steam humidification and hypertonic saline nasal irrigations  Rx- Nasogel  f/u 1 month w/ audio

## 2024-08-15 NOTE — PHYSICAL EXAM
[de-identified] : bilat wax-obstructive [Nasal Endoscopy Performed] : nasal endoscopy was performed, see procedure section for findings [de-identified] : dry [Midline] : trachea located in midline position [Normal] : no rashes

## 2024-08-21 ENCOUNTER — APPOINTMENT (OUTPATIENT)
Dept: RHEUMATOLOGY | Facility: CLINIC | Age: 69
End: 2024-08-21
Payer: MEDICARE

## 2024-08-21 VITALS
OXYGEN SATURATION: 96 % | RESPIRATION RATE: 16 BRPM | HEART RATE: 73 BPM | TEMPERATURE: 97.4 F | SYSTOLIC BLOOD PRESSURE: 93 MMHG | DIASTOLIC BLOOD PRESSURE: 66 MMHG

## 2024-08-21 PROCEDURE — 96374 THER/PROPH/DIAG INJ IV PUSH: CPT

## 2024-08-21 NOTE — HISTORY OF PRESENT ILLNESS
[Denies] : Denies [No] : No [N/A] : N/A [Yes] : Yes [Left upper extremity] : Left upper extremity [24g] : 24g [Start Time: ___] : Medication Start Time: [unfilled] [End Time: ___] : Medication End Time: [unfilled] [Medication Name: ___] : Medication Name: [unfilled] [Total Amount Administered: ___] : Total Amount Administered: [unfilled] [IV discontinued. Intact. No signs or symptoms of IV complications noted. Time: ___] : IV discontinued. Intact. No signs or symptoms of IV complications noted. Time: [unfilled] [Patient  instructed to seek medical attention with signs and symptoms of adverse effects] : Patient  instructed to seek medical attention with signs and symptoms of adverse effects [Patient left unit in no acute distress] : Patient left unit in no acute distress [Medications administered as ordered and tolerated well.] : Medications administered as ordered and tolerated well. [de-identified] : median cubital vein  [de-identified] : Patient presents for Zoledronic Acid infusion, doing well overall. Patient given discharge instructions, verbalized understanding and tolerated infusion well.

## 2024-08-30 ENCOUNTER — APPOINTMENT (OUTPATIENT)
Dept: ORTHOPEDIC SURGERY | Facility: CLINIC | Age: 69
End: 2024-08-30
Payer: MEDICARE

## 2024-08-30 DIAGNOSIS — M25.861 OTHER SPECIFIED JOINT DISORDERS, RIGHT KNEE: ICD-10-CM

## 2024-08-30 PROCEDURE — 99213 OFFICE O/P EST LOW 20 MIN: CPT | Mod: 25

## 2024-08-30 PROCEDURE — 73564 X-RAY EXAM KNEE 4 OR MORE: CPT | Mod: RT

## 2024-09-02 NOTE — PHYSICAL EXAM
[FreeTextEntry1] : NAD AOX3  RLE: Skin CDI. No effusion, swelling, or ecchymosis. ROM: 0-130 degrees. No varus/valgus instability. No joint line TTP. No TTP over quadriceps/patellar tendon. No TTP over tibial tubercle or pes insertion.  + Palpable right lateral knee mass, nontender. Neg Lachman. Neg David's. EHL/FHL/GS/TA 5/5. S/S/SP/DP/T SILT. Toes warm, BCR. Compartments soft.

## 2024-09-02 NOTE — DATA REVIEWED
[de-identified] : 8/30/2024-left knee x-rays (AP, lateral, sunrise, Leon): There are no fractures, dislocations, or osseous lesions.  7/19/24 - R knee MR:  Indeterminate mass lesion is identified arising within the distal quadriceps tendon. The mass has lobulated margins, is PD mildly hyperintense and measures approximately 15 x 11 x 36 mm in greatest dimensions (10:31, 8:6). The mass appears to extend through the distal quadriceps tendon between the layers of the rectus femoris and vastus with a small cystic component along the medial aspect of the distal quadriceps tendon (8:6, 10:21-35). No additional mass lesions are identified. Extensor mechanism is otherwise intact.  8/3/24 - Dual energy CT: Mixed density lobulated lesion is redemonstrated tracking within the anterior distal quadriceps tendon with component deep to the vastus lateralis tendon measuring approximately 3.3 x 2.4 x 1.4 cm (CC X TRV X AP) (series 4, image 26; series 8, image 58). No associated monosodium urate deposition. No subcutaneous or intramuscular hematoma. No subcutaneous air. Asymmetric fat infiltration within the proximal fibularis longus muscle. Moderate popliteal cyst.  IMPRESSION: 1. Indeterminant mixed density lobulated lesion is redemonstrated tracking within the anterior distal quadriceps tendon. No monosodium urate deposition within the mass. Further evaluation by orthopedic oncology with possible tissue sampling is suggested. 2. Punctate deposition of monosodium urate in the cartilage of the medial femoral condyle.

## 2024-09-02 NOTE — DATA REVIEWED
[de-identified] : 8/30/2024-left knee x-rays (AP, lateral, sunrise, Leon): There are no fractures, dislocations, or osseous lesions.  7/19/24 - R knee MR:  Indeterminate mass lesion is identified arising within the distal quadriceps tendon. The mass has lobulated margins, is PD mildly hyperintense and measures approximately 15 x 11 x 36 mm in greatest dimensions (10:31, 8:6). The mass appears to extend through the distal quadriceps tendon between the layers of the rectus femoris and vastus with a small cystic component along the medial aspect of the distal quadriceps tendon (8:6, 10:21-35). No additional mass lesions are identified. Extensor mechanism is otherwise intact.  8/3/24 - Dual energy CT: Mixed density lobulated lesion is redemonstrated tracking within the anterior distal quadriceps tendon with component deep to the vastus lateralis tendon measuring approximately 3.3 x 2.4 x 1.4 cm (CC X TRV X AP) (series 4, image 26; series 8, image 58). No associated monosodium urate deposition. No subcutaneous or intramuscular hematoma. No subcutaneous air. Asymmetric fat infiltration within the proximal fibularis longus muscle. Moderate popliteal cyst.  IMPRESSION: 1. Indeterminant mixed density lobulated lesion is redemonstrated tracking within the anterior distal quadriceps tendon. No monosodium urate deposition within the mass. Further evaluation by orthopedic oncology with possible tissue sampling is suggested. 2. Punctate deposition of monosodium urate in the cartilage of the medial femoral condyle.

## 2024-09-02 NOTE — DISCUSSION/SUMMARY
[de-identified] : 69-year-old female with an indeterminate right lateral knee mass arising from the distal quadriceps.  This may represent a rheumatoid nodule or localized synovitis versus other.  I have recommended a repeat MRI with and without contrast to assess for any changes from prior and for further evaluation.  Further management including possible biopsy versus surveillance will be decided after reviewing the upcoming MRI.

## 2024-09-02 NOTE — DISCUSSION/SUMMARY
[de-identified] : 69-year-old female with an indeterminate right lateral knee mass arising from the distal quadriceps.  This may represent a rheumatoid nodule or localized synovitis versus other.  I have recommended a repeat MRI with and without contrast to assess for any changes from prior and for further evaluation.  Further management including possible biopsy versus surveillance will be decided after reviewing the upcoming MRI.

## 2024-09-02 NOTE — HISTORY OF PRESENT ILLNESS
[FreeTextEntry1] : 69F w/ hx of asthma, fibromyal, RA (infliximab), presents for evaluation of a right anterolateral knee soft tissue mass noticed after she fell onto her right knee on 1/2024.  Her pain has been on and off localized to the lateral aspect of her knee.  Has been taking Tylenol.

## 2024-09-06 ENCOUNTER — APPOINTMENT (OUTPATIENT)
Dept: PULMONOLOGY | Facility: CLINIC | Age: 69
End: 2024-09-06
Payer: MEDICARE

## 2024-09-06 VITALS
WEIGHT: 138 LBS | OXYGEN SATURATION: 96 % | RESPIRATION RATE: 15 BRPM | TEMPERATURE: 97 F | HEART RATE: 66 BPM | DIASTOLIC BLOOD PRESSURE: 74 MMHG | SYSTOLIC BLOOD PRESSURE: 116 MMHG | BODY MASS INDEX: 26.06 KG/M2 | HEIGHT: 61 IN

## 2024-09-06 DIAGNOSIS — M06.9 RHEUMATOID ARTHRITIS, UNSPECIFIED: ICD-10-CM

## 2024-09-06 DIAGNOSIS — J84.9 INTERSTITIAL PULMONARY DISEASE, UNSPECIFIED: ICD-10-CM

## 2024-09-06 PROCEDURE — 99214 OFFICE O/P EST MOD 30 MIN: CPT

## 2024-09-06 NOTE — DISCUSSION/SUMMARY
[FreeTextEntry1] : The patient sounds like she has an acute respiratory illness on top of her pulmonary fibrosis.  I suggested using her albuterol a little more frequently and I gave her an AeroChamber and showed her how to use it.  I asked her to schedule lung function studies in a several weeks and I will see her after.

## 2024-09-06 NOTE — HISTORY OF PRESENT ILLNESS
[TextBox_4] : 69-year-old female with longstanding rheumatoid arthritis associated with pulmonary fibrosis.  She has been treated in the past with intermittent Rituxan in the past she has received a number of other agents including Humira, Enbrel, Remicade, Xeljanz and Orencia..  Lung function over the last 5 years have been reasonably stable.  The most recent one showed a mild decline in her total lung capacity due to decline in residual volume.  Her most recent chest CAT scan has demonstrated what has been suggested as mild progression.  She reports that during the last week she has had increased cough with phlegm production but she denies any fever, chest discomfort or increasing dyspnea.  She reports that the albuterol that she uses seems to help quite a bit.  Patient is Urdu-speaking.  The  was Rodney #[id number]

## 2024-09-06 NOTE — PHYSICAL EXAM
[No Acute Distress] : no acute distress [Normal Appearance] : normal appearance [Normal Rate/Rhythm] : normal rate/rhythm [Normal S1, S2] : normal s1, s2 [No Resp Distress] : no resp distress [No Focal Deficits] : no focal deficits [TextBox_105] : Marked joint deformity

## 2024-09-19 ENCOUNTER — APPOINTMENT (OUTPATIENT)
Dept: OTOLARYNGOLOGY | Facility: CLINIC | Age: 69
End: 2024-09-19

## 2024-09-19 DIAGNOSIS — R09.82 POSTNASAL DRIP: ICD-10-CM

## 2024-09-19 DIAGNOSIS — Z87.898 PERSONAL HISTORY OF OTHER SPECIFIED CONDITIONS: ICD-10-CM

## 2024-09-19 RX ORDER — FLUTICASONE PROPIONATE 50 UG/1
50 SPRAY, METERED NASAL TWICE DAILY
Qty: 1 | Refills: 11 | Status: ACTIVE | COMMUNITY
Start: 2024-09-19 | End: 1900-01-01

## 2024-09-19 RX ORDER — AZELASTINE HYDROCHLORIDE 137 UG/1
0.1 SPRAY, METERED NASAL TWICE DAILY
Qty: 1 | Refills: 11 | Status: ACTIVE | COMMUNITY
Start: 2024-09-19 | End: 1900-01-01

## 2024-09-20 ENCOUNTER — APPOINTMENT (OUTPATIENT)
Dept: INTERNAL MEDICINE | Facility: CLINIC | Age: 69
End: 2024-09-20

## 2024-09-20 ENCOUNTER — NON-APPOINTMENT (OUTPATIENT)
Age: 69
End: 2024-09-20

## 2024-09-20 ENCOUNTER — APPOINTMENT (OUTPATIENT)
Dept: INTERNAL MEDICINE | Facility: CLINIC | Age: 69
End: 2024-09-20
Payer: MEDICARE

## 2024-09-20 VITALS
OXYGEN SATURATION: 98 % | TEMPERATURE: 98.3 F | SYSTOLIC BLOOD PRESSURE: 110 MMHG | HEART RATE: 61 BPM | DIASTOLIC BLOOD PRESSURE: 76 MMHG | HEIGHT: 61 IN | WEIGHT: 132 LBS | BODY MASS INDEX: 24.92 KG/M2

## 2024-09-20 DIAGNOSIS — J45.909 UNSPECIFIED ASTHMA, UNCOMPLICATED: ICD-10-CM

## 2024-09-20 DIAGNOSIS — J84.10 PULMONARY FIBROSIS, UNSPECIFIED: ICD-10-CM

## 2024-09-20 DIAGNOSIS — R05.9 COUGH, UNSPECIFIED: ICD-10-CM

## 2024-09-20 DIAGNOSIS — Z91.81 HISTORY OF FALLING: ICD-10-CM

## 2024-09-20 DIAGNOSIS — J43.9 EMPHYSEMA, UNSPECIFIED: ICD-10-CM

## 2024-09-20 DIAGNOSIS — J47.9 BRONCHIECTASIS, UNCOMPLICATED: ICD-10-CM

## 2024-09-20 PROCEDURE — 99214 OFFICE O/P EST MOD 30 MIN: CPT

## 2024-09-20 RX ORDER — AZITHROMYCIN 250 MG/1
250 TABLET, FILM COATED ORAL
Qty: 1 | Refills: 0 | Status: ACTIVE | COMMUNITY
Start: 2024-09-20 | End: 1900-01-01

## 2024-09-20 RX ORDER — METHYLPREDNISOLONE 4 MG/1
4 TABLET ORAL
Qty: 1 | Refills: 0 | Status: ACTIVE | COMMUNITY
Start: 2024-09-20 | End: 1900-01-01

## 2024-09-20 RX ORDER — AMOXICILLIN AND CLAVULANATE POTASSIUM 875; 125 MG/1; MG/1
875-125 TABLET, COATED ORAL
Qty: 20 | Refills: 0 | Status: ACTIVE | COMMUNITY
Start: 2024-09-20 | End: 1900-01-01

## 2024-09-20 NOTE — HEALTH RISK ASSESSMENT
[No falls in past year] : Patient reported no falls in the past year [0] : 2) Feeling down, depressed, or hopeless: Not at all (0) [PHQ-2 Negative - No further assessment needed] : PHQ-2 Negative - No further assessment needed [[id number]] : 0

## 2024-09-20 NOTE — REVIEW OF SYSTEMS
[Chills] : chills [Nasal Discharge] : nasal discharge [Sore Throat] : sore throat [Shortness Of Breath] : shortness of breath [Cough] : cough [Nausea] : nausea [Vomiting] : no vomiting [Negative] : Heme/Lymph

## 2024-09-20 NOTE — PHYSICAL EXAM
[No Acute Distress] : no acute distress [Well-Appearing] : well-appearing [Normal Sclera/Conjunctiva] : normal sclera/conjunctiva [Normal Outer Ear/Nose] : the outer ears and nose were normal in appearance [Normal Oropharynx] : the oropharynx was normal [No JVD] : no jugular venous distention [No Respiratory Distress] : no respiratory distress  [No Accessory Muscle Use] : no accessory muscle use [Clear to Auscultation] : lungs were clear to auscultation bilaterally [Normal Rate] : normal rate  [Regular Rhythm] : with a regular rhythm [Normal S1, S2] : normal S1 and S2 [No Edema] : there was no peripheral edema [No HSM] : no HSM [de-identified] : very DEEP COUGH

## 2024-09-20 NOTE — PHYSICAL EXAM
[No Acute Distress] : no acute distress [Well-Appearing] : well-appearing [Normal Sclera/Conjunctiva] : normal sclera/conjunctiva [Normal Outer Ear/Nose] : the outer ears and nose were normal in appearance [Normal Oropharynx] : the oropharynx was normal [No JVD] : no jugular venous distention [No Respiratory Distress] : no respiratory distress  [No Accessory Muscle Use] : no accessory muscle use [Clear to Auscultation] : lungs were clear to auscultation bilaterally [Normal Rate] : normal rate  [Regular Rhythm] : with a regular rhythm [Normal S1, S2] : normal S1 and S2 [No Edema] : there was no peripheral edema [No HSM] : no HSM [de-identified] : very DEEP COUGH

## 2024-09-20 NOTE — HEALTH RISK ASSESSMENT
[No falls in past year] : Patient reported no falls in the past year [0] : 2) Feeling down, depressed, or hopeless: Not at all (0) [PHQ-2 Negative - No further assessment needed] : PHQ-2 Negative - No further assessment needed [[id number]] : 0

## 2024-09-20 NOTE — HISTORY OF PRESENT ILLNESS
[FreeTextEntry8] : 68 yo woman Poor Historian RA on immunosuppressants, pulmonary fibrosis, emphysema, bronchiectasis indeterminant mixed density lobulated leg lesion R/O MALIGNANCY Distal Quadraceps muscle additional MRI Sept 24 2024 Dr Bae  called yesterday asking for antibiotiics for a cough, asking for Macrodantin which was given for empiric UTI OTC treatment. Explained that must come in for pulmonary symptoms. Given Televisit which was unrevealing given poor history and unable to use VIDEO. Treated empirically for PND yesterday  Cough  No sick contacts September 16 which have been persistent green/clear sputum, rib pain w cough, diaphoresis, SOB and wheezing YESTERDAY ?? relief w inhaler No fever  PHQ 2  Not Depressed. States as painful as her joints get, she puts on her makeup and dresses well to avoid feeling down.

## 2024-09-20 NOTE — ASSESSMENT
[FreeTextEntry1] : Emphysema/COPD Cough Sputum  Not ill appearing Immunocompromised CT chest Respiratory PCR Augmentin + azithro Medrol given Emphysema  Quadraceps lesion  Saw Dr Bae  F/U MRI Sept 24  F/U Dr Bae  Gave copy of lipid profile and HbA1c CPE in 2 weeks

## 2024-09-22 ENCOUNTER — NON-APPOINTMENT (OUTPATIENT)
Age: 69
End: 2024-09-22

## 2024-09-22 NOTE — ASSESSMENT
[FreeTextEntry1] : 68 y/o F with PMH RA, pulmonary fibrosis, emphysema, lobulated leg lesion concerning for cancer presenting with cough.  #URI -Symptoms c/w viral upper respiratory infection -Given immunocompromised state, will bring patient in for in-person appt to evaluate further -Patient scheduled for appt today at 3:40pm

## 2024-09-22 NOTE — HISTORY OF PRESENT ILLNESS
[Verbal consent obtained from patient] : the patient, [unfilled] [FreeTextEntry8] : 70 y/o F with PMH RA, pulmonary fibrosis, emphysema, lobulated leg lesion presenting with cough. Specifically, patient states her symptoms began 5 days ago and are intensifying. She c/o cough productive of green/white sputum, which has been causing pain in her ribs and abdomen. She has had shortness of breath increased from her baseline over this time, for which she has been using her daily inhalers. Associated symptoms include itchy eyes/nose, sore throat, sinus congestion, chills, and nausea. She denies any sick contacts. Has not checked her temperature at home but denies subjective fevers. Her appetite has been normal and she has been drinking fluids consistently.

## 2024-09-24 ENCOUNTER — OUTPATIENT (OUTPATIENT)
Dept: OUTPATIENT SERVICES | Facility: HOSPITAL | Age: 69
LOS: 1 days | End: 2024-09-24
Payer: MEDICARE

## 2024-09-24 ENCOUNTER — APPOINTMENT (OUTPATIENT)
Dept: MRI IMAGING | Facility: CLINIC | Age: 69
End: 2024-09-24
Payer: MEDICARE

## 2024-09-24 DIAGNOSIS — Z98.890 OTHER SPECIFIED POSTPROCEDURAL STATES: Chronic | ICD-10-CM

## 2024-09-24 DIAGNOSIS — Z98.1 ARTHRODESIS STATUS: Chronic | ICD-10-CM

## 2024-09-24 LAB
RAPID RVP RESULT: DETECTED
RV+EV RNA SPEC QL NAA+PROBE: DETECTED
SARS-COV-2 RNA PNL RESP NAA+PROBE: NOT DETECTED

## 2024-09-24 PROCEDURE — 73723 MRI JOINT LWR EXTR W/O&W/DYE: CPT | Mod: 26,RT

## 2024-09-24 PROCEDURE — A9585: CPT

## 2024-09-24 PROCEDURE — 73723 MRI JOINT LWR EXTR W/O&W/DYE: CPT

## 2024-10-01 ENCOUNTER — APPOINTMENT (OUTPATIENT)
Dept: INTERNAL MEDICINE | Facility: CLINIC | Age: 69
End: 2024-10-01
Payer: MEDICARE

## 2024-10-01 ENCOUNTER — OUTPATIENT (OUTPATIENT)
Dept: OUTPATIENT SERVICES | Facility: HOSPITAL | Age: 69
LOS: 1 days | End: 2024-10-01
Payer: MEDICARE

## 2024-10-01 VITALS
BODY MASS INDEX: 25.24 KG/M2 | SYSTOLIC BLOOD PRESSURE: 116 MMHG | OXYGEN SATURATION: 98 % | HEART RATE: 57 BPM | WEIGHT: 132 LBS | HEIGHT: 60.5 IN | DIASTOLIC BLOOD PRESSURE: 80 MMHG

## 2024-10-01 DIAGNOSIS — M81.0 AGE-RELATED OSTEOPOROSIS W/OUT CURRENT PATHOLOGICAL FRACTURE: ICD-10-CM

## 2024-10-01 DIAGNOSIS — Z91.81 HISTORY OF FALLING: ICD-10-CM

## 2024-10-01 DIAGNOSIS — Z87.898 PERSONAL HISTORY OF OTHER SPECIFIED CONDITIONS: ICD-10-CM

## 2024-10-01 DIAGNOSIS — M06.9 RHEUMATOID ARTHRITIS, UNSPECIFIED: ICD-10-CM

## 2024-10-01 DIAGNOSIS — Z00.00 ENCOUNTER FOR GENERAL ADULT MEDICAL EXAMINATION W/OUT ABNORMAL FINDINGS: ICD-10-CM

## 2024-10-01 DIAGNOSIS — I25.10 ATHEROSCLEROTIC HEART DISEASE OF NATIVE CORONARY ARTERY W/OUT ANGINA PECTORIS: ICD-10-CM

## 2024-10-01 DIAGNOSIS — Z98.890 OTHER SPECIFIED POSTPROCEDURAL STATES: Chronic | ICD-10-CM

## 2024-10-01 DIAGNOSIS — I10 ESSENTIAL (PRIMARY) HYPERTENSION: ICD-10-CM

## 2024-10-01 DIAGNOSIS — H61.23 IMPACTED CERUMEN, BILATERAL: ICD-10-CM

## 2024-10-01 DIAGNOSIS — Z79.60 LONG TERM (CURRENT) USE OF UNSPECIFIED IMMUNOMODULATORS AND IMMUNOSUPPRESSANTS: ICD-10-CM

## 2024-10-01 DIAGNOSIS — H93.12 TINNITUS, LEFT EAR: ICD-10-CM

## 2024-10-01 DIAGNOSIS — Z98.1 ARTHRODESIS STATUS: Chronic | ICD-10-CM

## 2024-10-01 DIAGNOSIS — J84.10 PULMONARY FIBROSIS, UNSPECIFIED: ICD-10-CM

## 2024-10-01 DIAGNOSIS — J06.9 ACUTE UPPER RESPIRATORY INFECTION, UNSPECIFIED: ICD-10-CM

## 2024-10-01 PROCEDURE — G0439: CPT

## 2024-10-01 NOTE — HISTORY OF PRESENT ILLNESS
[FreeTextEntry1] : CPE [de-identified] : 70 yo RA w rheumatory nodules,  emphysema,  bronchiectasis Lobulated Leg Lesion R/O MALIGNANCY distal quadraceps muscle F/U Dr Elizondo  Complex enhancing mass inseparable from lateral fibers of distraceps  rule out SARCOMA  Likely need BIOPSY Seen for URI Sept 20, 2024  I gave Augmentin and sent for CT Chest RA jana  so POOR RESPONSE TO influenza vaccine OA osteoporosis  Diveticulitis treated Augmentin w relief 2023 No AAA on July 2024 US Aorta 10/31/2023 note suggest rheumatoid aortitis but Aortic US July 2024 and abdominal CT no mention of aortitis CT Angio Coronary Score: Aorta: There is no evidence of aortic atherosclerotic disease Sept 2021 Ca score   LCX 60  RCA 0   aorta NO ATHEROSCLEROSIS  LDL 72  repeat DIRECT  DEXA -2.5 osteoporosis Given gastrits given Reclast/Zoledronic Acid infusions BISPHOSPHONATE IV by Rheumatology since January 20,2021 last administered August 21, 2021 DEXA 11/2022  -2.2  -1.6  -1.7

## 2024-10-01 NOTE — ASSESSMENT
[FreeTextEntry1] : 1) Osteoporosis responding to IV Reclast infusions by rheumatology' 2)  URI was rhinovirus  S/P steroid azithro augmentin resolving S/P Medrol 3) CT chest 10/2/2024  emphysema S/P URI 4) R distal quadracep soft tissue mass r/o malignancy on MRI Dr Catalino gómez 5) Unable to give PCV 20 influenza COVID given Truxima  Review vaccination status for all patients, and if possible, bring up to date with all immunizations (following current immunization guideline recommendations) prior to rituximab initiation and administer nonlive vaccines at least 4 weeks prior to initiating a rituximab course of therapy. Response to some immunizations may be lower in some patients receiving rituximab. UpToDate  Not given because will not have immune response to non-live vaccines as per rheumatology

## 2024-10-01 NOTE — PHYSICAL EXAM
[de-identified] : tender lateral insertion of quadraceps near KNEE RLE  When press lateral knee(where mass is) pain at patella

## 2024-10-01 NOTE — COUNSELING
[Fall prevention counseling provided] : Fall prevention counseling provided [Adequate lighting] : Adequate lighting [No throw rugs] : No throw rugs [Sleep ___ hours/day] : Sleep [unfilled] hours/day

## 2024-10-01 NOTE — HISTORY OF PRESENT ILLNESS
[FreeTextEntry1] : CPE [de-identified] : 68 yo RA w rheumatory nodules,  emphysema,  bronchiectasis Lobulated Leg Lesion R/O MALIGNANCY distal quadraceps muscle F/U Dr Elizondo  Complex enhancing mass inseparable from lateral fibers of distraceps  rule out SARCOMA  Likely need BIOPSY Seen for URI Sept 20, 2024  I gave Augmentin and sent for CT Chest RA jana  so POOR RESPONSE TO influenza vaccine OA osteoporosis  Diveticulitis treated Augmentin w relief 2023 No AAA on July 2024 US Aorta 10/31/2023 note suggest rheumatoid aortitis but Aortic US July 2024 and abdominal CT no mention of aortitis CT Angio Coronary Score: Aorta: There is no evidence of aortic atherosclerotic disease Sept 2021 Ca score   LCX 60  RCA 0   aorta NO ATHEROSCLEROSIS  LDL 72  repeat DIRECT  DEXA -2.5 osteoporosis Given gastrits given Reclast/Zoledronic Acid infusions BISPHOSPHONATE IV by Rheumatology since January 20,2021 last administered August 21, 2021 DEXA 11/2022  -2.2  -1.6  -1.7

## 2024-10-01 NOTE — PHYSICAL EXAM
[de-identified] : tender lateral insertion of quadraceps near KNEE RLE  When press lateral knee(where mass is) pain at patella

## 2024-10-01 NOTE — REVIEW OF SYSTEMS
[Fever] : no fever [Chest Pain] : no chest pain [Palpitations] : no palpitations [Leg Claudication] : no leg claudication [Orthopnea] : no orthopnea [Shortness Of Breath] : no shortness of breath [Wheezing] : no wheezing [Abdominal Pain] : no abdominal pain [FreeTextEntry6] : resolving cough

## 2024-10-02 ENCOUNTER — OUTPATIENT (OUTPATIENT)
Dept: OUTPATIENT SERVICES | Facility: HOSPITAL | Age: 69
LOS: 1 days | End: 2024-10-02
Payer: MEDICARE

## 2024-10-02 ENCOUNTER — APPOINTMENT (OUTPATIENT)
Dept: CT IMAGING | Facility: CLINIC | Age: 69
End: 2024-10-02
Payer: MEDICARE

## 2024-10-02 DIAGNOSIS — R05.9 COUGH, UNSPECIFIED: ICD-10-CM

## 2024-10-02 DIAGNOSIS — J84.10 PULMONARY FIBROSIS, UNSPECIFIED: ICD-10-CM

## 2024-10-02 PROCEDURE — 71250 CT THORAX DX C-: CPT | Mod: 26

## 2024-10-02 PROCEDURE — 71250 CT THORAX DX C-: CPT

## 2024-10-04 ENCOUNTER — APPOINTMENT (OUTPATIENT)
Dept: ORTHOPEDIC SURGERY | Facility: CLINIC | Age: 69
End: 2024-10-04
Payer: MEDICARE

## 2024-10-04 DIAGNOSIS — M25.861 OTHER SPECIFIED JOINT DISORDERS, RIGHT KNEE: ICD-10-CM

## 2024-10-04 PROCEDURE — 99213 OFFICE O/P EST LOW 20 MIN: CPT

## 2024-10-07 DIAGNOSIS — Z13.83 ENCOUNTER FOR SCREENING FOR RESPIRATORY DISORDER NEC: ICD-10-CM

## 2024-10-08 DIAGNOSIS — J84.10 PULMONARY FIBROSIS, UNSPECIFIED: ICD-10-CM

## 2024-10-08 DIAGNOSIS — Z00.00 ENCOUNTER FOR GENERAL ADULT MEDICAL EXAMINATION WITHOUT ABNORMAL FINDINGS: ICD-10-CM

## 2024-10-08 DIAGNOSIS — Z91.81 HISTORY OF FALLING: ICD-10-CM

## 2024-10-08 DIAGNOSIS — M06.9 RHEUMATOID ARTHRITIS, UNSPECIFIED: ICD-10-CM

## 2024-10-08 DIAGNOSIS — I25.10 ATHEROSCLEROTIC HEART DISEASE OF NATIVE CORONARY ARTERY WITHOUT ANGINA PECTORIS: ICD-10-CM

## 2024-10-09 LAB
ALBUMIN SERPL ELPH-MCNC: 4 G/DL
ALP BLD-CCNC: 59 U/L
ALT SERPL-CCNC: 13 U/L
ANION GAP SERPL CALC-SCNC: 14 MMOL/L
AST SERPL-CCNC: 18 U/L
BASOPHILS # BLD AUTO: 0.03 K/UL
BASOPHILS NFR BLD AUTO: 0.5 %
BILIRUB SERPL-MCNC: 0.5 MG/DL
BUN SERPL-MCNC: 15 MG/DL
CALCIUM SERPL-MCNC: 9.4 MG/DL
CHLORIDE SERPL-SCNC: 102 MMOL/L
CHOLEST SERPL-MCNC: 176 MG/DL
CO2 SERPL-SCNC: 27 MMOL/L
CREAT SERPL-MCNC: 0.79 MG/DL
EGFR: 81 ML/MIN/1.73M2
EOSINOPHIL # BLD AUTO: 0.36 K/UL
EOSINOPHIL NFR BLD AUTO: 5.7 %
ESTIMATED AVERAGE GLUCOSE: 120 MG/DL
GLUCOSE SERPL-MCNC: 82 MG/DL
HBA1C MFR BLD HPLC: 5.8 %
HCT VFR BLD CALC: 39.7 %
HDLC SERPL-MCNC: 80 MG/DL
HGB BLD-MCNC: 13 G/DL
IMM GRANULOCYTES NFR BLD AUTO: 0.2 %
LDLC SERPL CALC-MCNC: 86 MG/DL
LDLC SERPL DIRECT ASSAY-MCNC: 86 MG/DL
LYMPHOCYTES # BLD AUTO: 1.65 K/UL
LYMPHOCYTES NFR BLD AUTO: 26.1 %
MAN DIFF?: NORMAL
MCHC RBC-ENTMCNC: 29.4 PG
MCHC RBC-ENTMCNC: 32.7 GM/DL
MCV RBC AUTO: 89.8 FL
MONOCYTES # BLD AUTO: 0.5 K/UL
MONOCYTES NFR BLD AUTO: 7.9 %
NEUTROPHILS # BLD AUTO: 3.78 K/UL
NEUTROPHILS NFR BLD AUTO: 59.6 %
NONHDLC SERPL-MCNC: 97 MG/DL
PLATELET # BLD AUTO: 220 K/UL
POTASSIUM SERPL-SCNC: 4.5 MMOL/L
PROT SERPL-MCNC: 6.7 G/DL
RBC # BLD: 4.42 M/UL
RBC # FLD: 13.6 %
SODIUM SERPL-SCNC: 143 MMOL/L
TRIGL SERPL-MCNC: 51 MG/DL
WBC # FLD AUTO: 6.33 K/UL

## 2024-10-11 ENCOUNTER — APPOINTMENT (OUTPATIENT)
Dept: PULMONOLOGY | Facility: CLINIC | Age: 69
End: 2024-10-11
Payer: MEDICARE

## 2024-10-11 VITALS
WEIGHT: 127 LBS | HEART RATE: 58 BPM | OXYGEN SATURATION: 97 % | DIASTOLIC BLOOD PRESSURE: 81 MMHG | HEIGHT: 61 IN | BODY MASS INDEX: 23.98 KG/M2 | SYSTOLIC BLOOD PRESSURE: 121 MMHG

## 2024-10-11 DIAGNOSIS — J84.9 INTERSTITIAL PULMONARY DISEASE, UNSPECIFIED: ICD-10-CM

## 2024-10-11 DIAGNOSIS — J47.9 BRONCHIECTASIS, UNCOMPLICATED: ICD-10-CM

## 2024-10-11 PROCEDURE — 94010 BREATHING CAPACITY TEST: CPT

## 2024-10-11 PROCEDURE — 94726 PLETHYSMOGRAPHY LUNG VOLUMES: CPT

## 2024-10-11 PROCEDURE — ZZZZZ: CPT

## 2024-10-11 PROCEDURE — 99214 OFFICE O/P EST MOD 30 MIN: CPT | Mod: 25

## 2024-10-11 PROCEDURE — 94729 DIFFUSING CAPACITY: CPT

## 2024-10-18 ENCOUNTER — APPOINTMENT (OUTPATIENT)
Dept: RHEUMATOLOGY | Facility: CLINIC | Age: 69
End: 2024-10-18
Payer: MEDICARE

## 2024-10-18 VITALS
HEIGHT: 61 IN | OXYGEN SATURATION: 98 % | HEART RATE: 61 BPM | BODY MASS INDEX: 24.35 KG/M2 | DIASTOLIC BLOOD PRESSURE: 71 MMHG | SYSTOLIC BLOOD PRESSURE: 105 MMHG | WEIGHT: 129 LBS

## 2024-10-18 PROCEDURE — 99215 OFFICE O/P EST HI 40 MIN: CPT

## 2024-10-18 PROCEDURE — G2211 COMPLEX E/M VISIT ADD ON: CPT

## 2024-10-18 RX ORDER — METHYLPREDNISOLONE SODIUM SUCCINATE 125 MG/2ML
125 INJECTION, POWDER, FOR SOLUTION INTRAMUSCULAR; INTRAVENOUS
Refills: 0 | Status: ACTIVE | OUTPATIENT
Start: 2024-10-18

## 2024-10-18 RX ORDER — CETIRIZINE HYDROCHLORIDE 10 MG/1
10 CAPSULE, LIQUID FILLED ORAL
Refills: 0 | Status: ACTIVE | OUTPATIENT
Start: 2024-10-18

## 2024-10-18 RX ORDER — RITUXIMAB 10 MG/ML
500 INJECTION, SOLUTION INTRAVENOUS
Refills: 0 | Status: ACTIVE | OUTPATIENT
Start: 2024-10-18

## 2024-10-18 RX ADMIN — CETIRIZINE HYDROCHLORIDE 0 MG: 10 CAPSULE, LIQUID FILLED ORAL at 00:00

## 2024-10-18 RX ADMIN — RITUXIMAB 0 MG/50ML: 10 INJECTION, SOLUTION INTRAVENOUS at 00:00

## 2024-10-18 RX ADMIN — Medication 0 MG: at 00:00

## 2024-10-18 RX ADMIN — METHYLPREDNISOLONE SODIUM SUCCINATE 0 MG: 125 INJECTION, POWDER, FOR SOLUTION INTRAMUSCULAR; INTRAVENOUS at 00:00

## 2024-10-21 ENCOUNTER — NON-APPOINTMENT (OUTPATIENT)
Age: 69
End: 2024-10-21

## 2024-10-21 DIAGNOSIS — M06.9 RHEUMATOID ARTHRITIS, UNSPECIFIED: ICD-10-CM

## 2024-10-21 LAB
25(OH)D3 SERPL-MCNC: 24.6 NG/ML
ALBUMIN SERPL ELPH-MCNC: 4 G/DL
ALP BLD-CCNC: 57 U/L
ALT SERPL-CCNC: 11 U/L
ANION GAP SERPL CALC-SCNC: 12 MMOL/L
AST SERPL-CCNC: 18 U/L
BASOPHILS # BLD AUTO: 0.04 K/UL
BASOPHILS NFR BLD AUTO: 0.6 %
BILIRUB SERPL-MCNC: 0.3 MG/DL
BUN SERPL-MCNC: 12 MG/DL
CALCIUM SERPL-MCNC: 9.2 MG/DL
CD16+CD56+ CELLS # BLD: 47 CELLS/UL
CD16+CD56+ CELLS NFR BLD: 6 %
CD19 CELLS NFR BLD: 8 CELLS/UL
CD3 CELLS # BLD: 758 CELLS/UL
CD3 CELLS NFR BLD: 90 %
CD3+CD4+ CELLS # BLD: 584 CELLS/UL
CD3+CD4+ CELLS NFR BLD: 67 %
CD3+CD4+ CELLS/CD3+CD8+ CLL SPEC: 2.88 RATIO
CD3+CD8+ CELLS # SPEC: 203 CELLS/UL
CD3+CD8+ CELLS NFR BLD: 23 %
CELLS.CD3-CD19+/CELLS IN BLOOD: 1 %
CHLORIDE SERPL-SCNC: 103 MMOL/L
CO2 SERPL-SCNC: 26 MMOL/L
CREAT SERPL-MCNC: 0.77 MG/DL
CRP SERPL-MCNC: <3 MG/L
EGFR: 83 ML/MIN/1.73M2
EOSINOPHIL # BLD AUTO: 0.34 K/UL
EOSINOPHIL NFR BLD AUTO: 5 %
ERYTHROCYTE [SEDIMENTATION RATE] IN BLOOD BY WESTERGREN METHOD: 23 MM/HR
GLUCOSE SERPL-MCNC: 85 MG/DL
HAV IGM SER QL: NONREACTIVE
HBV CORE IGG+IGM SER QL: NONREACTIVE
HBV CORE IGM SER QL: NONREACTIVE
HBV SURFACE AG SER QL: NONREACTIVE
HCT VFR BLD CALC: 40.8 %
HCV AB SER QL: NONREACTIVE
HCV S/CO RATIO: 0.12 S/CO
HGB BLD-MCNC: 13.1 G/DL
IGA SER QL IEP: 409 MG/DL
IGG SER QL IEP: 1369 MG/DL
IGM SER QL IEP: 41 MG/DL
IMM GRANULOCYTES NFR BLD AUTO: 0.1 %
LYMPHOCYTES # BLD AUTO: 1.86 K/UL
LYMPHOCYTES NFR BLD AUTO: 27.6 %
M TB IFN-G BLD-IMP: NEGATIVE
MAN DIFF?: NORMAL
MCHC RBC-ENTMCNC: 29.8 PG
MCHC RBC-ENTMCNC: 32.1 GM/DL
MCV RBC AUTO: 92.9 FL
MONOCYTES # BLD AUTO: 0.57 K/UL
MONOCYTES NFR BLD AUTO: 8.5 %
NEUTROPHILS # BLD AUTO: 3.92 K/UL
NEUTROPHILS NFR BLD AUTO: 58.2 %
PLATELET # BLD AUTO: 216 K/UL
POTASSIUM SERPL-SCNC: 3.8 MMOL/L
PROT SERPL-MCNC: 7 G/DL
QUANTIFERON TB PLUS MITOGEN MINUS NIL: 2.56 IU/ML
QUANTIFERON TB PLUS NIL: 0.04 IU/ML
QUANTIFERON TB PLUS TB1 MINUS NIL: 0 IU/ML
QUANTIFERON TB PLUS TB2 MINUS NIL: 0.04 IU/ML
RBC # BLD: 4.39 M/UL
RBC # FLD: 13.2 %
SODIUM SERPL-SCNC: 141 MMOL/L
WBC # FLD AUTO: 6.74 K/UL

## 2024-10-21 RX ORDER — RITUXIMAB-ABBS 10 MG/ML
500 INJECTION, SOLUTION INTRAVENOUS
Refills: 0 | Status: ACTIVE | OUTPATIENT
Start: 2024-10-21

## 2024-10-30 ENCOUNTER — APPOINTMENT (OUTPATIENT)
Dept: OTOLARYNGOLOGY | Facility: CLINIC | Age: 69
End: 2024-10-30
Payer: MEDICARE

## 2024-10-30 VITALS
WEIGHT: 129 LBS | DIASTOLIC BLOOD PRESSURE: 74 MMHG | SYSTOLIC BLOOD PRESSURE: 108 MMHG | TEMPERATURE: 97.6 F | BODY MASS INDEX: 24.35 KG/M2 | HEART RATE: 62 BPM | HEIGHT: 61 IN

## 2024-10-30 DIAGNOSIS — M26.609 UNSPECIFIED TEMPOROMANDIBULAR JOINT DISORDER: ICD-10-CM

## 2024-10-30 DIAGNOSIS — H92.09 OTALGIA, UNSPECIFIED EAR: ICD-10-CM

## 2024-10-30 DIAGNOSIS — J31.0 CHRONIC RHINITIS: ICD-10-CM

## 2024-10-30 DIAGNOSIS — M26.622 ARTHRALGIA OF LEFT TEMPOROMANDIBULAR JOINT: ICD-10-CM

## 2024-10-30 DIAGNOSIS — R42 DIZZINESS AND GIDDINESS: ICD-10-CM

## 2024-10-30 DIAGNOSIS — H90.3 SENSORINEURAL HEARING LOSS, BILATERAL: ICD-10-CM

## 2024-10-30 DIAGNOSIS — J34.2 DEVIATED NASAL SEPTUM: ICD-10-CM

## 2024-10-30 PROCEDURE — 92557 COMPREHENSIVE HEARING TEST: CPT

## 2024-10-30 PROCEDURE — 99214 OFFICE O/P EST MOD 30 MIN: CPT | Mod: 25

## 2024-10-30 PROCEDURE — 92567 TYMPANOMETRY: CPT

## 2024-10-30 RX ORDER — NACL/NAHCO3/HYALURON SOD/ALOE 0.9 %
SPRAY GEL (ML) NASAL
Qty: 1 | Refills: 5 | Status: ACTIVE | COMMUNITY
Start: 2024-10-30 | End: 1900-01-01

## 2024-10-30 RX ORDER — FLUTICASONE PROPIONATE 50 UG/1
50 SPRAY, METERED NASAL
Qty: 3 | Refills: 3 | Status: ACTIVE | COMMUNITY
Start: 2024-10-30 | End: 1900-01-01

## 2024-11-25 ENCOUNTER — LABORATORY RESULT (OUTPATIENT)
Age: 69
End: 2024-11-25

## 2024-11-25 ENCOUNTER — APPOINTMENT (OUTPATIENT)
Dept: RHEUMATOLOGY | Facility: CLINIC | Age: 69
End: 2024-11-25
Payer: MEDICARE

## 2024-11-25 VITALS
RESPIRATION RATE: 16 BRPM | OXYGEN SATURATION: 95 % | DIASTOLIC BLOOD PRESSURE: 69 MMHG | SYSTOLIC BLOOD PRESSURE: 114 MMHG | HEART RATE: 56 BPM

## 2024-11-25 VITALS
RESPIRATION RATE: 16 BRPM | DIASTOLIC BLOOD PRESSURE: 75 MMHG | OXYGEN SATURATION: 95 % | TEMPERATURE: 97.7 F | SYSTOLIC BLOOD PRESSURE: 113 MMHG | HEART RATE: 54 BPM

## 2024-11-25 VITALS
TEMPERATURE: 98 F | SYSTOLIC BLOOD PRESSURE: 92 MMHG | DIASTOLIC BLOOD PRESSURE: 57 MMHG | HEART RATE: 54 BPM | RESPIRATION RATE: 16 BRPM | OXYGEN SATURATION: 99 %

## 2024-11-25 PROCEDURE — 96413 CHEMO IV INFUSION 1 HR: CPT

## 2024-11-25 PROCEDURE — 96374 THER/PROPH/DIAG INJ IV PUSH: CPT | Mod: 59

## 2024-11-25 PROCEDURE — 96415 CHEMO IV INFUSION ADDL HR: CPT

## 2024-11-25 RX ORDER — METHYLPREDNISOLONE SODIUM SUCCINATE 125 MG/2ML
125 INJECTION, POWDER, FOR SOLUTION INTRAMUSCULAR; INTRAVENOUS
Qty: 0 | Refills: 0 | Status: COMPLETED
Start: 2024-10-18

## 2024-11-25 RX ORDER — RITUXIMAB-ABBS 10 MG/ML
500 INJECTION, SOLUTION INTRAVENOUS
Qty: 0 | Refills: 0 | Status: COMPLETED
Start: 2024-10-21

## 2024-11-25 RX ORDER — CETIRIZINE HYDROCHLORIDE 10 MG/1
10 CAPSULE, LIQUID FILLED ORAL
Qty: 0 | Refills: 0 | Status: COMPLETED
Start: 2024-10-18

## 2024-11-27 ENCOUNTER — APPOINTMENT (OUTPATIENT)
Dept: OTOLARYNGOLOGY | Facility: CLINIC | Age: 69
End: 2024-11-27

## 2024-12-09 ENCOUNTER — APPOINTMENT (OUTPATIENT)
Dept: RHEUMATOLOGY | Facility: CLINIC | Age: 69
End: 2024-12-09
Payer: MEDICARE

## 2024-12-09 ENCOUNTER — LABORATORY RESULT (OUTPATIENT)
Age: 69
End: 2024-12-09

## 2024-12-09 VITALS
HEART RATE: 59 BPM | RESPIRATION RATE: 16 BRPM | SYSTOLIC BLOOD PRESSURE: 100 MMHG | OXYGEN SATURATION: 99 % | DIASTOLIC BLOOD PRESSURE: 60 MMHG

## 2024-12-09 VITALS
HEART RATE: 64 BPM | DIASTOLIC BLOOD PRESSURE: 61 MMHG | RESPIRATION RATE: 16 BRPM | OXYGEN SATURATION: 97 % | TEMPERATURE: 98.2 F | SYSTOLIC BLOOD PRESSURE: 97 MMHG

## 2024-12-09 VITALS
OXYGEN SATURATION: 98 % | HEART RATE: 53 BPM | RESPIRATION RATE: 16 BRPM | TEMPERATURE: 98.1 F | DIASTOLIC BLOOD PRESSURE: 61 MMHG | SYSTOLIC BLOOD PRESSURE: 96 MMHG

## 2024-12-09 PROCEDURE — 96415 CHEMO IV INFUSION ADDL HR: CPT

## 2024-12-09 PROCEDURE — 96413 CHEMO IV INFUSION 1 HR: CPT

## 2024-12-09 PROCEDURE — 96374 THER/PROPH/DIAG INJ IV PUSH: CPT | Mod: 59

## 2024-12-09 RX ORDER — RITUXIMAB-ABBS 10 MG/ML
500 INJECTION, SOLUTION INTRAVENOUS
Qty: 0 | Refills: 0 | Status: COMPLETED
Start: 2024-10-21

## 2024-12-09 RX ORDER — CETIRIZINE HYDROCHLORIDE 10 MG/1
10 CAPSULE, LIQUID FILLED ORAL
Qty: 0 | Refills: 0 | Status: COMPLETED
Start: 2024-10-18

## 2024-12-09 RX ORDER — METHYLPREDNISOLONE SODIUM SUCCINATE 125 MG/2ML
125 INJECTION, POWDER, FOR SOLUTION INTRAMUSCULAR; INTRAVENOUS
Qty: 0 | Refills: 0 | Status: COMPLETED
Start: 2024-10-18

## 2024-12-11 ENCOUNTER — APPOINTMENT (OUTPATIENT)
Dept: RHEUMATOLOGY | Facility: CLINIC | Age: 69
End: 2024-12-11
Payer: MEDICARE

## 2024-12-11 ENCOUNTER — LABORATORY RESULT (OUTPATIENT)
Age: 69
End: 2024-12-11

## 2024-12-11 VITALS
HEIGHT: 61 IN | OXYGEN SATURATION: 97 % | SYSTOLIC BLOOD PRESSURE: 104 MMHG | HEART RATE: 68 BPM | BODY MASS INDEX: 24.92 KG/M2 | DIASTOLIC BLOOD PRESSURE: 68 MMHG | WEIGHT: 132 LBS

## 2024-12-11 DIAGNOSIS — M06.9 RHEUMATOID ARTHRITIS, UNSPECIFIED: ICD-10-CM

## 2024-12-11 DIAGNOSIS — M79.7 FIBROMYALGIA: ICD-10-CM

## 2024-12-11 DIAGNOSIS — M81.0 AGE-RELATED OSTEOPOROSIS W/OUT CURRENT PATHOLOGICAL FRACTURE: ICD-10-CM

## 2024-12-11 PROCEDURE — G2211 COMPLEX E/M VISIT ADD ON: CPT

## 2024-12-11 PROCEDURE — 99214 OFFICE O/P EST MOD 30 MIN: CPT

## 2024-12-16 LAB
CD16+CD56+ CELLS # BLD: 93 CELLS/UL
CD16+CD56+ CELLS NFR BLD: 8 %
CD19 CELLS NFR BLD: 5 CELLS/UL
CD3 CELLS # BLD: 1059 CELLS/UL
CD3 CELLS NFR BLD: 89 %
CD3+CD4+ CELLS # BLD: 741 CELLS/UL
CD3+CD4+ CELLS NFR BLD: 61 %
CD3+CD4+ CELLS/CD3+CD8+ CLL SPEC: 2.23 RATIO
CD3+CD8+ CELLS # SPEC: 332 CELLS/UL
CD3+CD8+ CELLS NFR BLD: 27 %
CELLS.CD3-CD19+/CELLS IN BLOOD: <1 %
FOLATE SERPL-MCNC: >20 NG/ML
VIT B12 SERPL-MCNC: >2000 PG/ML

## 2025-01-09 NOTE — ED PROVIDER NOTE - CARDIOVASCULAR [+], MLM
Seek immediate medical attention if your child develops:  worsening abdominal pain, new or worsening nausea, new or worsening vomiting, new or worsening diarrhea, chest pain, shortness of breath, pain with urination, problems urinating, fever, chills, weakness, or any new or worsening symptoms.    Follow up with your primary care doctor in the next 1-2 days to have your child's symptoms further assessed.       CHEST PAIN

## 2025-01-16 ENCOUNTER — APPOINTMENT (OUTPATIENT)
Dept: RADIOLOGY | Facility: CLINIC | Age: 70
End: 2025-01-16
Payer: MEDICARE

## 2025-01-16 PROCEDURE — 73620 X-RAY EXAM OF FOOT: CPT | Mod: LT,RT

## 2025-01-16 PROCEDURE — 77080 DXA BONE DENSITY AXIAL: CPT

## 2025-01-16 PROCEDURE — 73130 X-RAY EXAM OF HAND: CPT | Mod: LT,RT

## 2025-01-22 ENCOUNTER — APPOINTMENT (OUTPATIENT)
Dept: MRI IMAGING | Facility: CLINIC | Age: 70
End: 2025-01-22
Payer: MEDICARE

## 2025-01-22 ENCOUNTER — OUTPATIENT (OUTPATIENT)
Dept: OUTPATIENT SERVICES | Facility: HOSPITAL | Age: 70
LOS: 1 days | End: 2025-01-22
Payer: MEDICARE

## 2025-01-22 DIAGNOSIS — Z98.890 OTHER SPECIFIED POSTPROCEDURAL STATES: Chronic | ICD-10-CM

## 2025-01-22 DIAGNOSIS — Z98.1 ARTHRODESIS STATUS: Chronic | ICD-10-CM

## 2025-01-22 DIAGNOSIS — M06.9 RHEUMATOID ARTHRITIS, UNSPECIFIED: ICD-10-CM

## 2025-01-22 PROCEDURE — 73723 MRI JOINT LWR EXTR W/O&W/DYE: CPT | Mod: 26,RT

## 2025-01-22 PROCEDURE — 73723 MRI JOINT LWR EXTR W/O&W/DYE: CPT

## 2025-01-22 PROCEDURE — A9585: CPT

## 2025-01-29 ENCOUNTER — APPOINTMENT (OUTPATIENT)
Dept: CARDIOLOGY | Facility: CLINIC | Age: 70
End: 2025-01-29

## 2025-02-07 ENCOUNTER — RX RENEWAL (OUTPATIENT)
Age: 70
End: 2025-02-07

## 2025-03-12 ENCOUNTER — APPOINTMENT (OUTPATIENT)
Dept: RHEUMATOLOGY | Facility: CLINIC | Age: 70
End: 2025-03-12
Payer: MEDICARE

## 2025-03-12 VITALS
SYSTOLIC BLOOD PRESSURE: 128 MMHG | DIASTOLIC BLOOD PRESSURE: 80 MMHG | BODY MASS INDEX: 26.38 KG/M2 | WEIGHT: 139.6 LBS | HEART RATE: 64 BPM | OXYGEN SATURATION: 98 %

## 2025-03-12 DIAGNOSIS — M25.511 PAIN IN RIGHT SHOULDER: ICD-10-CM

## 2025-03-12 PROCEDURE — 99214 OFFICE O/P EST MOD 30 MIN: CPT | Mod: 25

## 2025-03-12 PROCEDURE — 20610 DRAIN/INJ JOINT/BURSA W/O US: CPT | Mod: RT

## 2025-03-12 RX ORDER — METHYLPRED ACET/NACL,ISO-OS/PF 40 MG/ML
40 VIAL (ML) INJECTION
Refills: 0 | Status: COMPLETED | OUTPATIENT
Start: 2025-03-12

## 2025-03-12 RX ORDER — LIDOCAINE HYDROCHLORIDE 10 MG/ML
1 INJECTION, SOLUTION INFILTRATION; PERINEURAL
Refills: 0 | Status: COMPLETED | OUTPATIENT
Start: 2025-03-12

## 2025-03-12 RX ADMIN — METHYLPREDNISOLONE ACETATE MG/ML: 40 INJECTION, SUSPENSION INTRA-ARTICULAR; INTRALESIONAL; INTRAMUSCULAR; SOFT TISSUE at 00:00

## 2025-03-12 RX ADMIN — LIDOCAINE HYDROCHLORIDE %: 10 INJECTION, SOLUTION INFILTRATION; PERINEURAL at 00:00

## 2025-03-12 NOTE — ADDENDUM
[FreeTextEntry1] : I, Rosaline Shannon, acted solely as a scribe for Dr. Stevenson on this date on 04/21/2023. 
previous_biopsy_has_been_previously_biopsied
Body Location Override (Optional): Left knee

## 2025-03-13 DIAGNOSIS — M06.9 RHEUMATOID ARTHRITIS, UNSPECIFIED: ICD-10-CM

## 2025-03-13 LAB
25(OH)D3 SERPL-MCNC: 44.4 NG/ML
ALBUMIN SERPL ELPH-MCNC: 4.4 G/DL
ALP BLD-CCNC: 71 U/L
ALT SERPL-CCNC: 13 U/L
ANION GAP SERPL CALC-SCNC: 14 MMOL/L
AST SERPL-CCNC: 16 U/L
BASOPHILS # BLD AUTO: 0.04 K/UL
BASOPHILS NFR BLD AUTO: 0.5 %
BILIRUB SERPL-MCNC: 0.3 MG/DL
BUN SERPL-MCNC: 15 MG/DL
CALCIUM SERPL-MCNC: 9.3 MG/DL
CD16+CD56+ CELLS # BLD: 225 CELLS/UL
CD16+CD56+ CELLS NFR BLD: 15 %
CD19 CELLS NFR BLD: 1 CELLS/UL
CD3 CELLS # BLD: 1270 CELLS/UL
CD3 CELLS NFR BLD: 84 %
CD3+CD4+ CELLS # BLD: 907 CELLS/UL
CD3+CD4+ CELLS NFR BLD: 58 %
CD3+CD4+ CELLS/CD3+CD8+ CLL SPEC: 2.21 RATIO
CD3+CD8+ CELLS # SPEC: 410 CELLS/UL
CD3+CD8+ CELLS NFR BLD: 26 %
CELLS.CD3-CD19+/CELLS IN BLOOD: <1 %
CHLORIDE SERPL-SCNC: 106 MMOL/L
CO2 SERPL-SCNC: 24 MMOL/L
CREAT SERPL-MCNC: 0.71 MG/DL
CRP SERPL-MCNC: <3 MG/L
EGFRCR SERPLBLD CKD-EPI 2021: 91 ML/MIN/1.73M2
EOSINOPHIL # BLD AUTO: 0.07 K/UL
EOSINOPHIL NFR BLD AUTO: 0.8 %
ERYTHROCYTE [SEDIMENTATION RATE] IN BLOOD BY WESTERGREN METHOD: 21 MM/HR
GLUCOSE SERPL-MCNC: 99 MG/DL
HCT VFR BLD CALC: 42.3 %
HGB BLD-MCNC: 13.8 G/DL
IGA SER QL IEP: 406 MG/DL
IGG SER QL IEP: 1419 MG/DL
IGM SER QL IEP: 34 MG/DL
IMM GRANULOCYTES NFR BLD AUTO: 0.1 %
LYMPHOCYTES # BLD AUTO: 1.67 K/UL
LYMPHOCYTES NFR BLD AUTO: 19.8 %
MAN DIFF?: NORMAL
MCHC RBC-ENTMCNC: 29.1 PG
MCHC RBC-ENTMCNC: 32.6 G/DL
MCV RBC AUTO: 89.1 FL
MONOCYTES # BLD AUTO: 0.69 K/UL
MONOCYTES NFR BLD AUTO: 8.2 %
NEUTROPHILS # BLD AUTO: 5.96 K/UL
NEUTROPHILS NFR BLD AUTO: 70.6 %
PLATELET # BLD AUTO: 245 K/UL
POTASSIUM SERPL-SCNC: 4.5 MMOL/L
PROT SERPL-MCNC: 7.4 G/DL
RBC # BLD: 4.75 M/UL
RBC # FLD: 13 %
SODIUM SERPL-SCNC: 144 MMOL/L
WBC # FLD AUTO: 8.44 K/UL

## 2025-04-09 ENCOUNTER — APPOINTMENT (OUTPATIENT)
Dept: PULMONOLOGY | Facility: CLINIC | Age: 70
End: 2025-04-09
Payer: MEDICARE

## 2025-04-09 VITALS
TEMPERATURE: 98.2 F | WEIGHT: 138 LBS | OXYGEN SATURATION: 94 % | HEIGHT: 61 IN | BODY MASS INDEX: 26.06 KG/M2 | DIASTOLIC BLOOD PRESSURE: 78 MMHG | SYSTOLIC BLOOD PRESSURE: 133 MMHG

## 2025-04-09 DIAGNOSIS — M06.9 RHEUMATOID ARTHRITIS, UNSPECIFIED: ICD-10-CM

## 2025-04-09 DIAGNOSIS — J47.9 BRONCHIECTASIS, UNCOMPLICATED: ICD-10-CM

## 2025-04-09 DIAGNOSIS — J84.9 INTERSTITIAL PULMONARY DISEASE, UNSPECIFIED: ICD-10-CM

## 2025-04-09 DIAGNOSIS — J84.10 PULMONARY FIBROSIS, UNSPECIFIED: ICD-10-CM

## 2025-04-09 PROCEDURE — 99214 OFFICE O/P EST MOD 30 MIN: CPT

## 2025-04-18 ENCOUNTER — APPOINTMENT (OUTPATIENT)
Dept: ORTHOPEDIC SURGERY | Facility: CLINIC | Age: 70
End: 2025-04-18
Payer: MEDICARE

## 2025-04-18 DIAGNOSIS — M25.861 OTHER SPECIFIED JOINT DISORDERS, RIGHT KNEE: ICD-10-CM

## 2025-04-18 PROCEDURE — 99213 OFFICE O/P EST LOW 20 MIN: CPT

## 2025-04-29 ENCOUNTER — INPATIENT (INPATIENT)
Facility: HOSPITAL | Age: 70
LOS: 0 days | Discharge: HOME | DRG: 313 | End: 2025-04-30
Attending: STUDENT IN AN ORGANIZED HEALTH CARE EDUCATION/TRAINING PROGRAM | Admitting: INTERNAL MEDICINE
Payer: MEDICARE

## 2025-04-29 VITALS
SYSTOLIC BLOOD PRESSURE: 150 MMHG | RESPIRATION RATE: 18 BRPM | HEIGHT: 60 IN | TEMPERATURE: 98 F | OXYGEN SATURATION: 98 % | DIASTOLIC BLOOD PRESSURE: 96 MMHG | WEIGHT: 139.99 LBS | HEART RATE: 71 BPM

## 2025-04-29 DIAGNOSIS — Z98.890 OTHER SPECIFIED POSTPROCEDURAL STATES: Chronic | ICD-10-CM

## 2025-04-29 DIAGNOSIS — Z98.1 ARTHRODESIS STATUS: Chronic | ICD-10-CM

## 2025-04-29 LAB
ALBUMIN SERPL ELPH-MCNC: 3.9 G/DL — SIGNIFICANT CHANGE UP (ref 3.3–5)
ALP SERPL-CCNC: 52 U/L — SIGNIFICANT CHANGE UP (ref 40–120)
ALT FLD-CCNC: 11 U/L — SIGNIFICANT CHANGE UP (ref 10–45)
ANION GAP SERPL CALC-SCNC: 10 MMOL/L — SIGNIFICANT CHANGE UP (ref 5–17)
APPEARANCE UR: CLEAR — SIGNIFICANT CHANGE UP
AST SERPL-CCNC: 17 U/L — SIGNIFICANT CHANGE UP (ref 10–40)
BACTERIA # UR AUTO: NEGATIVE /HPF — SIGNIFICANT CHANGE UP
BASOPHILS # BLD AUTO: 0.05 K/UL — SIGNIFICANT CHANGE UP (ref 0–0.2)
BASOPHILS NFR BLD AUTO: 0.8 % — SIGNIFICANT CHANGE UP (ref 0–2)
BILIRUB SERPL-MCNC: 0.3 MG/DL — SIGNIFICANT CHANGE UP (ref 0.2–1.2)
BILIRUB UR-MCNC: NEGATIVE — SIGNIFICANT CHANGE UP
BUN SERPL-MCNC: 18 MG/DL — SIGNIFICANT CHANGE UP (ref 7–23)
CALCIUM SERPL-MCNC: 9.4 MG/DL — SIGNIFICANT CHANGE UP (ref 8.4–10.5)
CAST: 0 /LPF — SIGNIFICANT CHANGE UP (ref 0–4)
CHLORIDE SERPL-SCNC: 103 MMOL/L — SIGNIFICANT CHANGE UP (ref 96–108)
CO2 SERPL-SCNC: 26 MMOL/L — SIGNIFICANT CHANGE UP (ref 22–31)
COLOR SPEC: YELLOW — SIGNIFICANT CHANGE UP
CREAT SERPL-MCNC: 0.76 MG/DL — SIGNIFICANT CHANGE UP (ref 0.5–1.3)
DIFF PNL FLD: ABNORMAL
EGFR: 84 ML/MIN/1.73M2 — SIGNIFICANT CHANGE UP
EGFR: 84 ML/MIN/1.73M2 — SIGNIFICANT CHANGE UP
EOSINOPHIL # BLD AUTO: 0.53 K/UL — HIGH (ref 0–0.5)
EOSINOPHIL NFR BLD AUTO: 8.7 % — HIGH (ref 0–6)
FLUAV AG NPH QL: SIGNIFICANT CHANGE UP
FLUBV AG NPH QL: SIGNIFICANT CHANGE UP
GAS PNL BLDV: SIGNIFICANT CHANGE UP
GLUCOSE SERPL-MCNC: 91 MG/DL — SIGNIFICANT CHANGE UP (ref 70–99)
GLUCOSE UR QL: NEGATIVE MG/DL — SIGNIFICANT CHANGE UP
HCT VFR BLD CALC: 39.8 % — SIGNIFICANT CHANGE UP (ref 34.5–45)
HGB BLD-MCNC: 13.5 G/DL — SIGNIFICANT CHANGE UP (ref 11.5–15.5)
IMM GRANULOCYTES NFR BLD AUTO: 0.2 % — SIGNIFICANT CHANGE UP (ref 0–0.9)
KETONES UR-MCNC: NEGATIVE MG/DL — SIGNIFICANT CHANGE UP
LEUKOCYTE ESTERASE UR-ACNC: NEGATIVE — SIGNIFICANT CHANGE UP
LIDOCAIN IGE QN: 43 U/L — SIGNIFICANT CHANGE UP (ref 7–60)
LYMPHOCYTES # BLD AUTO: 1.6 K/UL — SIGNIFICANT CHANGE UP (ref 1–3.3)
LYMPHOCYTES # BLD AUTO: 26.4 % — SIGNIFICANT CHANGE UP (ref 13–44)
MCHC RBC-ENTMCNC: 30.1 PG — SIGNIFICANT CHANGE UP (ref 27–34)
MCHC RBC-ENTMCNC: 33.9 G/DL — SIGNIFICANT CHANGE UP (ref 32–36)
MCV RBC AUTO: 88.6 FL — SIGNIFICANT CHANGE UP (ref 80–100)
MONOCYTES # BLD AUTO: 0.48 K/UL — SIGNIFICANT CHANGE UP (ref 0–0.9)
MONOCYTES NFR BLD AUTO: 7.9 % — SIGNIFICANT CHANGE UP (ref 2–14)
NEUTROPHILS # BLD AUTO: 3.4 K/UL — SIGNIFICANT CHANGE UP (ref 1.8–7.4)
NEUTROPHILS NFR BLD AUTO: 56 % — SIGNIFICANT CHANGE UP (ref 43–77)
NITRITE UR-MCNC: NEGATIVE — SIGNIFICANT CHANGE UP
NRBC BLD AUTO-RTO: 0 /100 WBCS — SIGNIFICANT CHANGE UP (ref 0–0)
OB PNL STL: NEGATIVE — SIGNIFICANT CHANGE UP
PH UR: 6.5 — SIGNIFICANT CHANGE UP (ref 5–8)
PLATELET # BLD AUTO: 206 K/UL — SIGNIFICANT CHANGE UP (ref 150–400)
POTASSIUM SERPL-MCNC: 3.8 MMOL/L — SIGNIFICANT CHANGE UP (ref 3.5–5.3)
POTASSIUM SERPL-SCNC: 3.8 MMOL/L — SIGNIFICANT CHANGE UP (ref 3.5–5.3)
PROT SERPL-MCNC: 7 G/DL — SIGNIFICANT CHANGE UP (ref 6–8.3)
PROT UR-MCNC: NEGATIVE MG/DL — SIGNIFICANT CHANGE UP
RBC # BLD: 4.49 M/UL — SIGNIFICANT CHANGE UP (ref 3.8–5.2)
RBC # FLD: 12.7 % — SIGNIFICANT CHANGE UP (ref 10.3–14.5)
RBC CASTS # UR COMP ASSIST: 1 /HPF — SIGNIFICANT CHANGE UP (ref 0–4)
RSV RNA NPH QL NAA+NON-PROBE: SIGNIFICANT CHANGE UP
SARS-COV-2 RNA SPEC QL NAA+PROBE: SIGNIFICANT CHANGE UP
SODIUM SERPL-SCNC: 139 MMOL/L — SIGNIFICANT CHANGE UP (ref 135–145)
SOURCE RESPIRATORY: SIGNIFICANT CHANGE UP
SP GR SPEC: 1.01 — SIGNIFICANT CHANGE UP (ref 1–1.03)
SQUAMOUS # UR AUTO: 0 /HPF — SIGNIFICANT CHANGE UP (ref 0–5)
TROPONIN T, HIGH SENSITIVITY RESULT: 7 NG/L — SIGNIFICANT CHANGE UP (ref 0–51)
TROPONIN T, HIGH SENSITIVITY RESULT: 7 NG/L — SIGNIFICANT CHANGE UP (ref 0–51)
UROBILINOGEN FLD QL: 0.2 MG/DL — SIGNIFICANT CHANGE UP (ref 0.2–1)
WBC # BLD: 6.07 K/UL — SIGNIFICANT CHANGE UP (ref 3.8–10.5)
WBC # FLD AUTO: 6.07 K/UL — SIGNIFICANT CHANGE UP (ref 3.8–10.5)
WBC UR QL: 0 /HPF — SIGNIFICANT CHANGE UP (ref 0–5)

## 2025-04-29 PROCEDURE — 93010 ELECTROCARDIOGRAM REPORT: CPT

## 2025-04-29 PROCEDURE — 99223 1ST HOSP IP/OBS HIGH 75: CPT

## 2025-04-29 PROCEDURE — 71046 X-RAY EXAM CHEST 2 VIEWS: CPT | Mod: 26

## 2025-04-29 PROCEDURE — 74177 CT ABD & PELVIS W/CONTRAST: CPT | Mod: 26

## 2025-04-29 RX ORDER — ACETAMINOPHEN 500 MG/5ML
1000 LIQUID (ML) ORAL ONCE
Refills: 0 | Status: COMPLETED | OUTPATIENT
Start: 2025-04-29 | End: 2025-04-29

## 2025-04-29 RX ORDER — LIDOCAINE HYDROCHLORIDE 20 MG/ML
10 JELLY TOPICAL ONCE
Refills: 0 | Status: COMPLETED | OUTPATIENT
Start: 2025-04-29 | End: 2025-04-29

## 2025-04-29 RX ORDER — MAGNESIUM, ALUMINUM HYDROXIDE 200-200 MG
30 TABLET,CHEWABLE ORAL ONCE
Refills: 0 | Status: COMPLETED | OUTPATIENT
Start: 2025-04-29 | End: 2025-04-29

## 2025-04-29 RX ORDER — SUCRALFATE 1 G
1 TABLET ORAL ONCE
Refills: 0 | Status: COMPLETED | OUTPATIENT
Start: 2025-04-29 | End: 2025-04-29

## 2025-04-29 RX ORDER — ATORVASTATIN CALCIUM 80 MG/1
40 TABLET, FILM COATED ORAL AT BEDTIME
Refills: 0 | Status: DISCONTINUED | OUTPATIENT
Start: 2025-04-29 | End: 2025-04-30

## 2025-04-29 RX ORDER — CARVEDILOL 3.12 MG/1
3.12 TABLET, FILM COATED ORAL DAILY
Refills: 0 | Status: DISCONTINUED | OUTPATIENT
Start: 2025-04-29 | End: 2025-04-30

## 2025-04-29 RX ADMIN — Medication 20 MILLIGRAM(S): at 15:57

## 2025-04-29 RX ADMIN — Medication 400 MILLIGRAM(S): at 12:55

## 2025-04-29 RX ADMIN — Medication 30 MILLILITER(S): at 12:57

## 2025-04-29 RX ADMIN — LIDOCAINE HYDROCHLORIDE 10 MILLILITER(S): 20 JELLY TOPICAL at 12:57

## 2025-04-29 RX ADMIN — Medication 40 MILLIGRAM(S): at 12:56

## 2025-04-29 RX ADMIN — Medication 1 GRAM(S): at 15:57

## 2025-04-29 RX ADMIN — Medication 1000 MILLILITER(S): at 12:55

## 2025-04-29 NOTE — ED CDU PROVIDER DISPOSITION NOTE - ATTENDING APP SHARED VISIT CONTRIBUTION OF CARE
Attending Jayne: I performed a face to face evaluation of the patient and obtained a history as well as performed a physical exam. I have discussed their management with the OLLIE. I have reviewed the OLLIE note and agree with the documented findings and plan of care, except as noted. This was a shared visit with an OLLIE. I reviewed and verified the documentation and independently performed my own history/exam/medical decision making. My medical decision making and observations are found below. Please refer to any progress notes for updates on clinical course. My notes supersedes the above OLLIE note in case of discrepancy    MDM:  69 y/o F w/ PMH of GERD, CAD, asthma, RA, anxiety, HLD originally presented to the ED w/ epigastric pain x3 weeks. In main ED labs/imaging w/ no emergent findings. Placed in CDU for further cardiac testing, tele monitoring, and cards eval. pt feeling well this morning, awaiting CT coronary and cards eval for final dispo decision. >>> While in CDU had CT coronary that showed abnormalities of the LAD and LCX. Seen by cardiology who recommended admission for cath. Pt agreeable w/ plan. Pt accepted under cath attending.

## 2025-04-29 NOTE — ED PROVIDER NOTE - CLINICAL SUMMARY MEDICAL DECISION MAKING FREE TEXT BOX
As per H&P,  Vital signs reviewed and stable not tachycardic or febrile, pt well appearing on exam.  Rectal exam without evidence of melena or hematochezia no reports of melena 3 days ago.  differential is broad given patient's multiple symptoms includes but not limited to gastritis peptic ulcer disease, pancreatitis, enteritis, colitis, duodenitis, UTI, atypical ACS, pneumonia, pneumothorax.  Patient's overall presentation is not consistent with pulmonary embolism, AAA or acute vascular catastrophe including aortic dissection.  Right upper quadrant nontender with negative Hugo sign no icterus or jaundice, biliary pathology less likely.  No CVA tenderness to suggest renal colic or pyelonephritis.  Plan for labs including lipase, VBG with lactate given patient age with abdominal pain, ECG cardiac monitor, troponin, CT abdomen pelvis, CXR, pain control IV fluids GI cocktail including pantoprazole given report of transient melena unfounded on physical exam.  Reassess n.p.o. challenge if workup otherwise nonactionable.  Consider CDU for cardiac workup given complaints of left-sided chest pain, HEART Score 4 pending ECG and Troponin. -Harlan Daniels PA-C As per H&P,  Vital signs reviewed and stable not tachycardic or febrile, pt well appearing on exam.  Rectal exam without evidence of melena or hematochezia no reports of melena 3 days ago.  differential is broad given patient's multiple symptoms includes but not limited to gastritis peptic ulcer disease, pancreatitis, enteritis, colitis, duodenitis, UTI, atypical ACS, pneumonia, pneumothorax.  Patient's overall presentation is not consistent with pulmonary embolism, AAA or acute vascular catastrophe including aortic dissection.  Right upper quadrant nontender with negative Hugo sign no icterus or jaundice, biliary pathology less likely.  No CVA tenderness to suggest renal colic or pyelonephritis.  Plan for labs including lipase, VBG with lactate given patient age with abdominal pain, ECG cardiac monitor, troponin, CT abdomen pelvis, CXR, pain control IV fluids GI cocktail including pantoprazole given report of transient melena unfounded on physical exam.  Reassess n.p.o. challenge if workup otherwise nonactionable.  Consider CDU for cardiac workup given complaints of left-sided chest pain, HEART Score 4 pending ECG and Troponin. -Harlan Mckeon: 70 year old female with epigastirc pain x 3 weeks. intermittent abdominal pain. pain in left chest to left shoulder.  PE: att exam: patient awake alert NAD . LUNGS CTAB no wheeze no crackle. CARD RRR no m/r/g.  Abdomen soft NT ND no rebound no guarding no CVA tenderness. EXT WWP no edema no calf tenderness CV 2+DP/PT bilaterally. neuro A&Ox3 gait normal.  skin warm and dry no rash  plan: will get labs, cxr, ekg, cardiac enzymes. r/o pacnreattis, r/o gi bleed. r/o gastritis. r/o infectious etiology. reassess.

## 2025-04-29 NOTE — ED ADULT NURSE NOTE - OBJECTIVE STATEMENT
71 y/o female came to the ED with complaints of epigastric pain x 3 weeks, abdominal pain with food and when laying down, cough, generalized weakness and left chest pain radiating to left shoulder, nausea, yellow stool. Denies SOB, headache, dizziness, vomiting, diarrhea, dysuria, fevers, chills.

## 2025-04-29 NOTE — ED CDU PROVIDER INITIAL DAY NOTE - PHYSICAL EXAMINATION
GEN: Pt in NAD  PSYCH: Affect appropriate.  EYES: Sclera white w/o injection.   ENT: Head NCAT. Neck supple FROM.  RESP: CTA b/l, no wheezes, rales, or rhonchi.   CARDIAC: RRR, clear distinct S1, S2, no appreciable murmurs.  ABD: Abdomen soft, mild suprapubic abdominal ttp, no rebound or guarding. No CVAT b/l.  VASC: No edema or calf tenderness.  SKIN: No notable rash.

## 2025-04-29 NOTE — ED PROVIDER NOTE - PHYSICAL EXAMINATION
GEN: Pt in NAD  PSYCH: Affect appropriate.  EYES: Sclera white w/o injection.   ENT: Head NCAT. Neck supple FROM.  RESP: CTA b/l, no wheezes, rales, or rhonchi.   CARDIAC: RRR, clear distinct S1, S2, no appreciable murmurs.  ABD: Abdomen soft, suprapubic ttp, no rebound or guarding.   Rectal Exam: Chaperone: Johana Montana PA-C. Minimal yellowish stool in vault, external skin tag approximately 6 o'clock, good rectal tone, no pain with exam.   VASC: No edema or calf tenderness.  SKIN: No notable rash.

## 2025-04-29 NOTE — ED ADULT NURSE REASSESSMENT NOTE - NS ED NURSE REASSESS COMMENT FT1
Pt received from SAVANAH Kaufman. Pt oriented to CDU & plan of care was discussed. Pt A&O x 4. Independent.  Pt in CDU for telemetry, CT coronaries and vital signs q4h . Pt denies any chest pain, SOB, dizziness or palpitations at this time. V/S stable, pt afebrile, Right AC 18g and Left AC 20g IV in place, patent and free of signs of infiltration. Pt resting in bed. Safety & comfort measures maintained. Call bell in reach. Care continues.

## 2025-04-29 NOTE — ED ADULT NURSE REASSESSMENT NOTE - NS ED NURSE REASSESS COMMENT FT1
Report received from SAVANAH Delcid. Pt noted to be resting in stretcher, A&ox3, speaking coherently, following commands, sensory/motor function intact, NAD noted. Sinus alessandra on CM, other VSS. Pt endorses mild mid chest discomfort radiating to back at this time. Cardiology at bedside for consult. Plan of care discussed with pt and verbalizes understanding. Safety and comfort measures maintained.

## 2025-04-29 NOTE — ED PROVIDER NOTE - OBJECTIVE STATEMENT
70-year-old female PMH of CAD, HLD, asthma, GERD, anxiety, rheumatoid arthritis presents to the ED complaining of epigastric pain for 3 week. Patient reports intermittent abdominal pain last that was occurring with increased frequency somewhat worse with laying down and with food.  Patient also reporting generalized weakness, cough productive of colorless "phlegm" intermittently, also pain in the left chest radiating to the left shoulder without clear palliative or provoking factors.  Patient reports nausea without vomiting.  Patient reports slightly looser stools in the past few days, reports 3 days ago had a transient episode of black appearing stool which since resolved and today had yellow appearing stool.  Patient denies history of GI bleed, reports is not on any aspirin anticoagulation or antiplatelet.  Patient denies recent frequent NSAID use reports only drinks alcohol special occasions.  Patient reports  last took NSAIDs approximately 3 years ago.  Patient reports occasional chills, denies fever, urinary symptoms, flank pain, rash, shortness of breath at rest, hemoptysis, lower extremity edema or pain, history of blood clots.  Patient reports family history of CAD.    Cardiology Dr. Shon Henry    As per chart review patient has coronary artery disease reports she followed with a cardiologist 3 or 4 years ago and reports a nonactionable workup other than a "slow heart rate".  Prior records reviewed Pharm nuclear stress test 11/27/2018 showing sinus bradycardia no significant ST abnormalities mild defects in distal anterior, apical, and distal inferior lateral walls that are fixed and demonstrate preserved wall motion suggesting attenuation artifact.  Do not correct with prone imaging area of scarring this territory cannot be excluded.  Patient also has cardiac catheterization results from 9/3/2015 showing normal coronary arteries and normal LV systolic function.  Reviewed [facility] patient also had TTE interpreted by Dr. Alejandro Partida on 12/21/2023 showing normal LV systolic function EF 64%, Normal RV systolic function minimal aortic regurg, minimal to mild MR, mild TR, minimal [place], thinning and dyskinesia of the interatrial septum, pulmonary artery systolic pressure 28 mm Hg.

## 2025-04-29 NOTE — ED CDU PROVIDER DISPOSITION NOTE - NSFOLLOWUPINSTRUCTIONS_ED_ALL_ED_FT
In the ER your CAT scan showed inflammation of the stomach likely gastritis and diverticulosis.  You need to follow-up with a gastroenterologist regarding these results and need further workup such as a colonoscopy and endoscopy.  Please take pantoprazole as prescribed.    1) Follow-up with your primary care within 48 hours.   Follow-up with gastroenterology in 2-3 weeks    [facility]  Gastroenterology  [address], [address]  [place] [address]  Phone: [phone]     Follow-up with your cardiologist in 1-2 weeks.    2) Take pantoprazole as prescribed for gastritis. Take Tylenol 1000mg (2 extra strength pills) every 6 hours as needed for pain.    3) Rest and drink plenty of fluids. Avoid spicy food, acidic foods (citrus, fruit juices, tomato sauce), coffee, tea, chocolate, alcohol. Avoid taking NSAIDs such as Ibuprofen, advil, motrin, naproxen, Aleve, etc.    4) Return to the ER if you experience any new or worsening symptoms including but not limited to severe abdominal pain, vomiting blood, bloody stools, loss of consciousness (fainting), chest pain, shortness of breath, fever >100.4, severe headache, or if any other concerns.

## 2025-04-29 NOTE — CONSULT NOTE ADULT - ATTENDING COMMENTS
70 year old woman with recurrent atypical chest pain, CTCA few years ago with non-obstructive plaque and plan by outpatient cardiologist to repeat. Currently pain is abdominal with some radiation to left chest, exam unremarkable, EKG normal and troponin 7. Plan for observation in CDU and obtain CT coronary angiogram.    To contact call Cardiology Fellow or Attending as listed on [email or web address] password: [email or web address].

## 2025-04-29 NOTE — ED ADULT NURSE NOTE - CAS EDN DISCHARGE INTERVENTIONS
Normal vision: sees adequately in most situations; can see medication labels, newsprint
Arm band on/IV intact

## 2025-04-29 NOTE — ED CDU PROVIDER INITIAL DAY NOTE - OBJECTIVE STATEMENT
70-year-old female PMH of CAD, HLD, asthma, GERD, anxiety, rheumatoid arthritis presents to the ED complaining of epigastric pain for 3 week. Patient reports intermittent abdominal pain last that was occurring with increased frequency somewhat worse with laying down and with food.  Patient also reporting generalized weakness, cough productive of colorless "phlegm" intermittently, also pain in the left chest radiating to the left shoulder without clear palliative or provoking factors.  Patient reports nausea without vomiting.  Patient reports slightly looser stools in the past few days, reports 3 days ago had a transient episode of black appearing stool which since resolved and today had yellow appearing stool. Denies current NSAID use.  +FHx of CAD.  Cardiology Dr. Shon Henry  Prior records reviewed Pharm nuclear stress test 11/27/2018 showing sinus bradycardia no significant ST abnormalities mild defects in distal anterior, apical, and distal inferior lateral walls that are fixed and demonstrate preserved wall motion suggesting attenuation artifact.  Do not correct with prone imaging area of scarring this territory cannot be excluded.  cardiac catheterization results from 9/3/2015 showing normal coronary arteries and normal LV systolic function.  TTE 12/21/2023 mild valvular dz, thinning and dyskinesia of the interatrial septum, no acute findings.  ED course: Labs nonactionable ECG unchanged from prior no ischemic changes, troponin 7, fecal occult blood negative.  Urinalysis not consistent with infection, negative flu/COVID/RSV.  CXR showing no focal consolidation or acute pathology.  CT abdomen pelvis showing likely gastritis patient started on PPI and sucralfate.  Patient cardiology office contacted requesting cardiology consult cardiologist consult placed patient sent to CDU for telemetry monitoring further cardiac workup.

## 2025-04-29 NOTE — ED CDU PROVIDER DISPOSITION NOTE - CLINICAL COURSE
· HPI Objective Statement: 70-year-old female PMH of CAD, HLD, asthma, GERD, anxiety, rheumatoid arthritis presents to the ED complaining of epigastric pain for 3 week. Patient reports intermittent abdominal pain last that was occurring with increased frequency somewhat worse with laying down and with food.  Patient also reporting generalized weakness, cough productive of colorless "phlegm" intermittently, also pain in the left chest radiating to the left shoulder without clear palliative or provoking factors.  Patient reports nausea without vomiting.  Patient reports slightly looser stools in the past few days, reports 3 days ago had a transient episode of black appearing stool which since resolved and today had yellow appearing stool. Denies current NSAID use.  +FHx of CAD.  	Cardiology Dr. Shon Henry  	Prior records reviewed Pharm nuclear stress test 11/27/2018 showing sinus bradycardia no significant ST abnormalities mild defects in distal anterior, apical, and distal inferior lateral walls that are fixed and demonstrate preserved wall motion suggesting attenuation artifact.  Do not correct with prone imaging area of scarring this territory cannot be excluded.  cardiac catheterization results from 9/3/2015 showing normal coronary arteries and normal LV systolic function.  TTE 12/21/2023 mild valvular dz, thinning and dyskinesia of the interatrial septum, no acute findings.  ED course: Labs nonactionable ECG unchanged from prior no ischemic changes, troponin 7, fecal occult blood negative.  Urinalysis not consistent with infection, negative flu/COVID/RSV.  CXR showing no focal consolidation or acute pathology.  CT abdomen pelvis showing likely gastritis patient started on PPI and sucralfate.  Patient cardiology office contacted requesting cardiology consult cardiologist consult placed patient sent to CDU for telemetry monitoring further cardiac workup.  CDU Course: ___ · HPI Objective Statement: 70-year-old female PMH of CAD, HLD, asthma, GERD, anxiety, rheumatoid arthritis presents to the ED complaining of epigastric pain for 3 week. Patient reports intermittent abdominal pain last that was occurring with increased frequency somewhat worse with laying down and with food.  Patient also reporting generalized weakness, cough productive of colorless "phlegm" intermittently, also pain in the left chest radiating to the left shoulder without clear palliative or provoking factors.  Patient reports nausea without vomiting.  Patient reports slightly looser stools in the past few days, reports 3 days ago had a transient episode of black appearing stool which since resolved and today had yellow appearing stool. Denies current NSAID use.  +FHx of CAD.  	Cardiology Dr. Shon Henry  	Prior records reviewed Pharm nuclear stress test 11/27/2018 showing sinus bradycardia no significant ST abnormalities mild defects in distal anterior, apical, and distal inferior lateral walls that are fixed and demonstrate preserved wall motion suggesting attenuation artifact.  Do not correct with prone imaging area of scarring this territory cannot be excluded.  cardiac catheterization results from 9/3/2015 showing normal coronary arteries and normal LV systolic function.  TTE 12/21/2023 mild valvular dz, thinning and dyskinesia of the interatrial septum, no acute findings.  ED course: Labs nonactionable ECG unchanged from prior no ischemic changes, troponin 7, fecal occult blood negative.  Urinalysis not consistent with infection, negative flu/COVID/RSV.  CXR showing no focal consolidation or acute pathology.  CT abdomen pelvis showing likely gastritis patient started on PPI and sucralfate.  Patient cardiology office contacted requesting cardiology consult cardiologist consult placed patient sent to CDU for telemetry monitoring further cardiac workup.  CDU Course: CT coronary showed moderate disease. cards recommended cath. ED attending Dr Godoy agreed

## 2025-04-29 NOTE — CONSULT NOTE ADULT - ASSESSMENT
CT heart coronary 9/1/2021  Mid LAD 50%  Cx 30%  70-year-old female PMH of CAD, HLD, asthma, GERD, anxiety, rheumatoid arthritis presents to the ED complaining of epigastric pain for 3 week as well as chronic intermittent chest pressure.    Has been having intermittent chest pressure worse with exertion since July. Outpatient cardiologist had planned to get repeat CCTA but was not done for unknown reason. Will obtain in AM.    CT heart coronary 9/1/2021  Mid LAD 50%  Cx 30%    Recommendations:  - CCTA in AM  -  home ASA, Lipitor, Coreg  - Telemetry

## 2025-04-29 NOTE — ED CDU PROVIDER INITIAL DAY NOTE - PROGRESS NOTE DETAILS
CDU PROGRESS NOTE FERNANDO FRYE: Received pt at 1900 sign-out. Case/plan reviewed. Pt resting in stretcher in NAD. VSS. Pt ate dinner. Ambulatory around unit with steady gait. S1 S2 noted, RRR, lungs CTA b/l, BS x4 with soft, nontender abdomen. Pt endorses mild mid chest discomfort, but states symptoms improved from earlier today. Pt declines analgesia now, Will closely monitor.

## 2025-04-29 NOTE — ED ADULT NURSE NOTE - NSFALLUNIVINTERV_ED_ALL_ED
Bed/Stretcher in lowest position, wheels locked, appropriate side rails in place/Call bell, personal items and telephone in reach/Instruct patient to call for assistance before getting out of bed/chair/stretcher/Non-slip footwear applied when patient is off stretcher/[place] to call system/Physically safe environment - no spills, clutter or unnecessary equipment/Purposeful proactive rounding/Room/bathroom lighting operational, light cord in reach

## 2025-04-29 NOTE — ED PROVIDER NOTE - PROGRESS NOTE DETAILS
Spoke to patient with  ID number [id number] discussed all results, diagnosis of likely gastritis given patient's symptoms advised on lifestyle and dietary modifications and to start pantoprazole, f/u with GI.  All questions answered. patient's cardiologist office was contacted by LEANDRO rodriguez to discuss case. -Harlan Daniels PA-C Spoke to PA from Dr. Henry's office he is unable to offer further advice regarding the patient or if the patient can follow-up in the office outpatient in an expeditious fashion for further testing.  Advising that we contact past cardiology for formal consult.  I messaged chest cardiology fellow for consult.  Previously discussed option for CDU with patient and she was agreeable.  Will place patient in CDU for further cardiac testing, telemonitoring.  Discussed with CDU PA. -Harlan Daniels PA-C Spoke to patient with  ID number [id number] discussed all results, diagnosis of likely gastritis given patient's symptoms advised on lifestyle and dietary modifications and to start pantoprazole, f/u with GI.  All questions answered. patient's cardiologist office was contacted by ED  to discuss case. -Harlan Daniels PA-C

## 2025-04-29 NOTE — CONSULT NOTE ADULT - SUBJECTIVE AND OBJECTIVE BOX
Cardiology Consult Note   [Please check [email or web address] password: "[email or web address]" for cardiology service schedule and contact information]    HPI: 70-year-old female PMH of CAD, HLD, asthma, GERD, anxiety, rheumatoid arthritis presents to the ED complaining of epigastric pain for 3 week. Patient reports intermittent abdominal pain last that was occurring with increased frequency somewhat worse with laying down and with food.  Patient also reporting generalized weakness, cough productive of colorless "phlegm" intermittently, also pain in the left chest radiating to the left shoulder without clear palliative or provoking factors.  Patient reports nausea without vomiting.  Patient reports slightly looser stools in the past few days, reports 3 days ago had a transient episode of black appearing stool which since resolved and today had yellow appearing stool.  Patient denies history of GI bleed, reports is not on any aspirin anticoagulation or antiplatelet.  Patient denies recent frequent NSAID use reports only drinks alcohol special occasions.  Patient reports  last took NSAIDs approximately 3 years ago.  Patient reports occasional chills, denies fever, urinary symptoms, flank pain, rash, shortness of breath at rest, hemoptysis, lower extremity edema or pain, history of blood clots.  Patient reports family history of CAD.      PAST MEDICAL & SURGICAL HISTORY:  Rheumatoid arthritis      Chronic sinusitis      HLD (hyperlipidemia)      GERD (gastroesophageal reflux disease)      Asthma      Anxiety      Coronary atherosclerosis      2019 novel coronavirus disease (COVID-19)  Not admitted      S/P  Section      Bilateral myopia  s/p laser  surgery      History of thumb surgery      Status post finger joint fusion        FAMILY HISTORY:  Family history of heart disease (Grandparent, Uncle)    Family history of breast cancer (Sibling)  cousin      SOCIAL HISTORY:  unchanged    MEDICATIONS:  carvedilol 3.125 milliGRAM(s) Oral daily            atorvastatin 40 milliGRAM(s) Oral at bedtime          -------------------------------------------------------------------------------------------  PHYSICAL EXAM:  T(C): 36.6 (25 @ 17:54), Max: 36.6 (25 @ 10:55)  HR: 55 (25 @ 17:54) (54 - 71)  BP: 162/86 (25 @ 17:54) (120/68 - 162/86)  RR: 16 (25 @ 17:54) (16 - 18)  SpO2: 98% (25 @ 17:54) (98% - 98%)  Wt(kg): --  I&O's Summary      GENERAL: NAD  HEAD: Atraumatic, Normocephalic.  ENT: Moist mucous membranes.  NECK: Supple, No JVD.  CHEST/LUNG: Clear to auscultation bilaterally; No rales, rhonchi, wheezing, or rubs. Unlabored respirations.  HEART: Regular rate and rhythm; No murmurs, rubs, or gallops.  ABDOMEN: Bowel sounds present; Soft, Nontender, Nondistended.   EXTREMITIES:  2+ Peripheral Pulses, brisk capillary refill. No clubbing, cyanosis, or edema.    -------------------------------------------------------------------------------------------  LABS:                          13.5   6.07  )-----------( 206      ( 2025 12:34 )             39.8         139  |  103  |  18  ----------------------------<  91  3.8   |  26  |  0.76    Ca    9.4      2025 12:34    TPro  7.0  /  Alb  3.9  /  TBili  0.3  /  DBili  x   /  AST  17  /  ALT  11  /  AlkPhos  52        CARDIAC MARKERS ( 2025 16:56 )  7 ng/L / x     / x     / x     / x     / x      CARDIAC MARKERS ( 2025 12:34 )  7 ng/L / x     / x     / x     / x     / x                  -------------------------------------------------------------------------------------------  Cardiovascular Diagnostic Testing:    ECG:     Echo:     Stress Testing:    Cath:    -------------------------------------------------------------------------------------------                 Cardiology Consult Note   [Please check [email or web address] password: [email or web address]" for cardiology service schedule and contact information]    HPI: 70-year-old female PMH of CAD, HLD, asthma, GERD, anxiety, rheumatoid arthritis presents to the ED complaining of epigastric pain for 3 week. Patient reports intermittent abdominal pain last that was occurring with increased frequency somewhat worse with laying down and with food.  Patient also reporting generalized weakness, cough productive of colorless "phlegm" intermittently, also pain in the left chest radiating to the left shoulder without clear palliative or provoking factors.  Patient reports nausea without vomiting.  Patient reports slightly looser stools in the past few days, reports 3 days ago had a transient episode of black appearing stool which since resolved and today had yellow appearing stool.  Patient denies history of GI bleed, reports is not on any aspirin anticoagulation or antiplatelet.  Patient denies recent frequent NSAID use reports only drinks alcohol special occasions.  Patient reports  last took NSAIDs approximately 3 years ago.  Patient reports occasional chills, denies fever, urinary symptoms, flank pain, rash, shortness of breath at rest, hemoptysis, lower extremity edema or pain, history of blood clots.  Patient reports family history of CAD.       ROS  CONSTITUTIONAL: No weakness, fevers or chills  EYES/ENT: No visual changes;  No dysphagia  NECK: No pain or stiffness  RESPIRATORY: No cough, wheezing, hemoptysis; No shortness of breath  CARDIOVASCULAR: pos mild chest pressure  GASTROINTESTINAL: No abdominal or epigastric pain. No nausea, vomiting, or hematemesis; No diarrhea or constipation. No melena or hematochezia.  BACK: No back pain  GENITOURINARY: No dysuria, frequency or hematuria  NEUROLOGICAL: No numbness or weakness  SKIN: No itching, burning, rashes, or lesions   All other review of systems is negative unless indicated above.    PAST MEDICAL & SURGICAL HISTORY:  Rheumatoid arthritis      Chronic sinusitis      HLD (hyperlipidemia)      GERD (gastroesophageal reflux disease)      Asthma      Anxiety      Coronary atherosclerosis      2019 novel coronavirus disease (COVID-19)  Not admitted      S/P  Section      Bilateral myopia  s/p laser  surgery      History of thumb surgery      Status post finger joint fusion        FAMILY HISTORY:  Family history of heart disease (Grandparent, Uncle)    Family history of breast cancer (Sibling)  cousin      SOCIAL HISTORY:  unchanged    MEDICATIONS:  carvedilol 3.125 milliGRAM(s) Oral daily            atorvastatin 40 milliGRAM(s) Oral at bedtime          -------------------------------------------------------------------------------------------  PHYSICAL EXAM:  T(C): 36.6 (25 @ 17:54), Max: 36.6 (25 @ 10:55)  HR: 55 (25 @ 17:54) (54 - 71)  BP: 162/86 (25 @ 17:54) (120/68 - 162/86)  RR: 16 (25 @ 17:54) (16 - 18)  SpO2: 98% (25 @ 17:54) (98% - 98%)  Wt(kg): --  I&O's Summary      GENERAL: NAD  HEAD: Atraumatic, Normocephalic.  ENT: Moist mucous membranes.  NECK: Supple, No JVD.  CHEST/LUNG: Clear to auscultation bilaterally; No rales, rhonchi, wheezing, or rubs. Unlabored respirations.  HEART: Regular rate and rhythm; No murmurs, rubs, or gallops.  ABDOMEN: Bowel sounds present; Soft, Nontender, Nondistended.   EXTREMITIES:  2+ Peripheral Pulses, brisk capillary refill. No clubbing, cyanosis, or edema.  NEURO: AAOx3, pleasant  SKIN: no rashes or lesions    -------------------------------------------------------------------------------------------  LABS:                          13.5   6.07  )-----------( 206      ( 2025 12:34 )             39.8         139  |  103  |  18  ----------------------------<  91  3.8   |  26  |  0.76    Ca    9.4      2025 12:34    TPro  7.0  /  Alb  3.9  /  TBili  0.3  /  DBili  x   /  AST  17  /  ALT  11  /  AlkPhos  52  04-29      CARDIAC MARKERS ( 2025 16:56 )  7 ng/L / x     / x     / x     / x     / x      CARDIAC MARKERS ( 2025 12:34 )  7 ng/L / x     / x     / x     / x     / x                  -------------------------------------------------------------------------------------------  Cardiovascular Diagnostic Testing:    ECG:     Echo:     Stress Testing:    Cath:    -------------------------------------------------------------------------------------------

## 2025-04-29 NOTE — ED CDU PROVIDER INITIAL DAY NOTE - CLINICAL SUMMARY MEDICAL DECISION MAKING FREE TEXT BOX
Mike: 70 year old female with pmhx of cad, htn, RA here with epgiastric abdominal pain and chest pain.  will c/w tele onitoring, cardiology consult.   reassess

## 2025-04-30 ENCOUNTER — TRANSCRIPTION ENCOUNTER (OUTPATIENT)
Age: 70
End: 2025-04-30

## 2025-04-30 VITALS
RESPIRATION RATE: 20 BRPM | HEART RATE: 71 BPM | OXYGEN SATURATION: 98 % | TEMPERATURE: 98 F | SYSTOLIC BLOOD PRESSURE: 119 MMHG | DIASTOLIC BLOOD PRESSURE: 80 MMHG

## 2025-04-30 DIAGNOSIS — R07.9 CHEST PAIN, UNSPECIFIED: ICD-10-CM

## 2025-04-30 LAB
ANION GAP SERPL CALC-SCNC: 11 MMOL/L — SIGNIFICANT CHANGE UP (ref 5–17)
BUN SERPL-MCNC: 15 MG/DL — SIGNIFICANT CHANGE UP (ref 7–23)
CALCIUM SERPL-MCNC: 8.9 MG/DL — SIGNIFICANT CHANGE UP (ref 8.4–10.5)
CHLORIDE SERPL-SCNC: 104 MMOL/L — SIGNIFICANT CHANGE UP (ref 96–108)
CO2 SERPL-SCNC: 24 MMOL/L — SIGNIFICANT CHANGE UP (ref 22–31)
CREAT SERPL-MCNC: 0.82 MG/DL — SIGNIFICANT CHANGE UP (ref 0.5–1.3)
CULTURE RESULTS: SIGNIFICANT CHANGE UP
EGFR: 77 ML/MIN/1.73M2 — SIGNIFICANT CHANGE UP
EGFR: 77 ML/MIN/1.73M2 — SIGNIFICANT CHANGE UP
GLUCOSE SERPL-MCNC: 123 MG/DL — HIGH (ref 70–99)
POTASSIUM SERPL-MCNC: 3.8 MMOL/L — SIGNIFICANT CHANGE UP (ref 3.5–5.3)
POTASSIUM SERPL-SCNC: 3.8 MMOL/L — SIGNIFICANT CHANGE UP (ref 3.5–5.3)
SODIUM SERPL-SCNC: 139 MMOL/L — SIGNIFICANT CHANGE UP (ref 135–145)
SPECIMEN SOURCE: SIGNIFICANT CHANGE UP

## 2025-04-30 PROCEDURE — 85014 HEMATOCRIT: CPT

## 2025-04-30 PROCEDURE — 71046 X-RAY EXAM CHEST 2 VIEWS: CPT

## 2025-04-30 PROCEDURE — 75574 CT ANGIO HRT W/3D IMAGE: CPT | Mod: MC

## 2025-04-30 PROCEDURE — 84132 ASSAY OF SERUM POTASSIUM: CPT

## 2025-04-30 PROCEDURE — 84484 ASSAY OF TROPONIN QUANT: CPT

## 2025-04-30 PROCEDURE — 80048 BASIC METABOLIC PNL TOTAL CA: CPT

## 2025-04-30 PROCEDURE — 82330 ASSAY OF CALCIUM: CPT

## 2025-04-30 PROCEDURE — C1769: CPT

## 2025-04-30 PROCEDURE — 93454 CORONARY ARTERY ANGIO S&I: CPT | Mod: 26

## 2025-04-30 PROCEDURE — 84295 ASSAY OF SERUM SODIUM: CPT

## 2025-04-30 PROCEDURE — 82947 ASSAY GLUCOSE BLOOD QUANT: CPT

## 2025-04-30 PROCEDURE — 85025 COMPLETE CBC W/AUTO DIFF WBC: CPT

## 2025-04-30 PROCEDURE — 96374 THER/PROPH/DIAG INJ IV PUSH: CPT

## 2025-04-30 PROCEDURE — 80053 COMPREHEN METABOLIC PANEL: CPT

## 2025-04-30 PROCEDURE — 87637 SARSCOV2&INF A&B&RSV AMP PRB: CPT

## 2025-04-30 PROCEDURE — C1887: CPT

## 2025-04-30 PROCEDURE — C1894: CPT

## 2025-04-30 PROCEDURE — 82272 OCCULT BLD FECES 1-3 TESTS: CPT

## 2025-04-30 PROCEDURE — 96375 TX/PRO/DX INJ NEW DRUG ADDON: CPT

## 2025-04-30 PROCEDURE — 82435 ASSAY OF BLOOD CHLORIDE: CPT

## 2025-04-30 PROCEDURE — G0378: CPT

## 2025-04-30 PROCEDURE — 99239 HOSP IP/OBS DSCHRG MGMT >30: CPT

## 2025-04-30 PROCEDURE — 75574 CT ANGIO HRT W/3D IMAGE: CPT | Mod: 26

## 2025-04-30 PROCEDURE — 85018 HEMOGLOBIN: CPT

## 2025-04-30 PROCEDURE — 87086 URINE CULTURE/COLONY COUNT: CPT

## 2025-04-30 PROCEDURE — 83690 ASSAY OF LIPASE: CPT

## 2025-04-30 PROCEDURE — 81001 URINALYSIS AUTO W/SCOPE: CPT

## 2025-04-30 PROCEDURE — 99233 SBSQ HOSP IP/OBS HIGH 50: CPT | Mod: GC

## 2025-04-30 PROCEDURE — 96376 TX/PRO/DX INJ SAME DRUG ADON: CPT

## 2025-04-30 PROCEDURE — 93454 CORONARY ARTERY ANGIO S&I: CPT

## 2025-04-30 PROCEDURE — 82803 BLOOD GASES ANY COMBINATION: CPT

## 2025-04-30 PROCEDURE — 99152 MOD SED SAME PHYS/QHP 5/>YRS: CPT

## 2025-04-30 PROCEDURE — 83605 ASSAY OF LACTIC ACID: CPT

## 2025-04-30 PROCEDURE — 93005 ELECTROCARDIOGRAM TRACING: CPT

## 2025-04-30 PROCEDURE — 74177 CT ABD & PELVIS W/CONTRAST: CPT | Mod: MC

## 2025-04-30 PROCEDURE — 99233 SBSQ HOSP IP/OBS HIGH 50: CPT

## 2025-04-30 PROCEDURE — 99285 EMERGENCY DEPT VISIT HI MDM: CPT | Mod: 25

## 2025-04-30 RX ORDER — SUCRALFATE 1 G
1 TABLET ORAL
Refills: 0 | DISCHARGE

## 2025-04-30 RX ORDER — ASPIRIN 325 MG
81 TABLET ORAL DAILY
Refills: 0 | Status: DISCONTINUED | OUTPATIENT
Start: 2025-04-30 | End: 2025-04-30

## 2025-04-30 RX ORDER — FLUTICASONE PROPIONATE 50 UG/1
1 SPRAY, METERED NASAL
Refills: 0 | DISCHARGE

## 2025-04-30 RX ORDER — ALBUTEROL SULFATE 2.5 MG/3ML
3 VIAL, NEBULIZER (ML) INHALATION
Refills: 0 | DISCHARGE

## 2025-04-30 RX ORDER — ACETAMINOPHEN 500 MG/5ML
1000 LIQUID (ML) ORAL ONCE
Refills: 0 | Status: COMPLETED | OUTPATIENT
Start: 2025-04-30 | End: 2025-04-30

## 2025-04-30 RX ORDER — CARVEDILOL 3.12 MG/1
1 TABLET, FILM COATED ORAL
Refills: 0 | DISCHARGE

## 2025-04-30 RX ADMIN — Medication 75 MILLILITER(S): at 16:43

## 2025-04-30 RX ADMIN — ATORVASTATIN CALCIUM 40 MILLIGRAM(S): 80 TABLET, FILM COATED ORAL at 08:57

## 2025-04-30 RX ADMIN — Medication 750 MILLILITER(S): at 16:43

## 2025-04-30 RX ADMIN — Medication 400 MILLIGRAM(S): at 13:33

## 2025-04-30 NOTE — ASU DISCHARGE PLAN (ADULT/PEDIATRIC) - NS MD DC FALL RISK RISK
For information on Fall & Injury Prevention, visit: [email or web address] OR  [email or web address] OR  https://www.cdc.gov/steadi/patient.html

## 2025-04-30 NOTE — CHART NOTE - NSCHARTNOTEFT_GEN_A_CORE
MEDICINE NP    Notified by RN patient with pending discharge order. Spoke with Quan Goetz Hospitalist and was told plan is to discharge. There is an active discharge now order from Hospitalist team.    VITAL SIGNS:  T(C): 36.4 (04-30-25 @ 20:50), Max: 36.8 (04-30-25 @ 08:16)  HR: 71 (04-30-25 @ 20:50) (50 - 71)  BP: 119/80 (04-30-25 @ 20:50) (95/58 - 148/74)  RR: 20 (04-30-25 @ 20:50) (17 - 20)  SpO2: 98% (04-30-25 @ 20:50) (96% - 100%)  Wt(kg): --

## 2025-04-30 NOTE — DISCHARGE NOTE PROVIDER - CARE PROVIDER_API CALL
Ramírez Hastings  Interventional Cardiology  [address] [place] [id number]  Phone: [phone]  Fax: [phone]  Follow Up Time: 2 weeks

## 2025-04-30 NOTE — DISCHARGE NOTE PROVIDER - NSDCCPCAREPLAN_GEN_ALL_CORE_FT
PRINCIPAL DISCHARGE DIAGNOSIS  Diagnosis: Chest pain  Assessment and Plan of Treatment: Please followup with your cardiologist in 1-2 weeks. You had a cardiac cath that did NOT need stents.      SECONDARY DISCHARGE DIAGNOSES  Diagnosis: Epigastric pain  Assessment and Plan of Treatment:

## 2025-04-30 NOTE — DISCHARGE NOTE NURSING/CASE MANAGEMENT/SOCIAL WORK - PATIENT PORTAL LINK FT
You can access the FollowMyHealth Patient Portal offered by [facility] by registering at the following website: [email or web address]. By joining [facility]’s FollowMyHealth portal, you will also be able to view your health information using other applications (apps) compatible with our system.

## 2025-04-30 NOTE — ASU DISCHARGE PLAN (ADULT/PEDIATRIC) - CARE PROVIDER_API CALL
Shon Henry  Vascular Medicine  [address] [place] [address], [address] [place] [id number]  Phone: [phone]  Fax: [phone]  Follow Up Time:

## 2025-04-30 NOTE — PROGRESS NOTE ADULT - ASSESSMENT
70-year-old female PMH of CAD, HLD, asthma, GERD, anxiety, rheumatoid arthritis presents to the ED complaining of epigastric pain for 3 week as well as chronic intermittent chest pressure.    Has been having intermittent chest pressure worse with exertion since July. Outpatient cardiologist had planned to get repeat CCTA. Will obtain here.    CT heart coronary 9/1/2021  Mid LAD 50%  Cx 30%    Recommendations:  - CCTA  - cw home ASA, Lipitor, Coreg  - Telemetry

## 2025-04-30 NOTE — ED ADULT NURSE REASSESSMENT NOTE - NS ED NURSE REASSESS COMMENT FT1
Pt received from SAVANAH Hair. Pt oriented to CDU & plan of care was discussed. Pt A&O x 4. Pt in CDU for tele, cardiac testing, cardiology consult. Pt denies any chest pain, SOB, dizziness or palpitations as of now. Pt on a cardiac monitor in NSR, HR in 60's. V/S stable, pt afebrile,  IV in place, patent and free of signs of infiltration. Pt resting in bed. Safety & comfort measures maintained. Call bell in reach. Will continue to monitor.

## 2025-04-30 NOTE — ASU DISCHARGE PLAN (ADULT/PEDIATRIC) - FINANCIAL ASSISTANCE
[facility] provides services at a reduced cost to those who are determined to be eligible through [facility]’s financial assistance program. Information regarding [facility]’s financial assistance program can be found by going to [email or web address] or by calling [phone].

## 2025-04-30 NOTE — H&P CARDIOLOGY - HISTORY OF PRESENT ILLNESS
This is a 69 y/o Lao speaking female With history of rheumatoid arthritis, CAD, HLD, asthma, GERD, anxiety, presents to the ED complaining of epigastric pain for 3 week as well as chronic intermittent chest pressure.  Has been having intermittent chest pressure worse with exertion since July. Outpatient cardiologist had planned to get repeat CCTA but was not done for unknown reason. Seen & evaluated by cardiologist & now recommends for C  < from: CT Angio Heart and Coronaries w/ IV Cont (04.30.25 @ 10:04) >    COMPARISON: CT Angio Heart Coronary with IV contrast 9/1/2021    Stenosis are reported in accordance of JCCT 2009 guidelines:  0- Normal: absence of plaque and no luminal stenosis  1- Minimal: plaque with <25% stenosis  2- Mild: 25-49% stenosis  3- Moderate: 50-69% stenosis  4- Severe: 70-99% stenosis  5- Occluded    FINDINGS:    Calcium Score: the observed Agatston calcium score is  691  Left main (LM) coronary artery:  0  Left anterior descending (LAD) coronary artery:  588  Left circumflex (LCX) coronary artery:  94  Right coronary artery (RCA):  9    Coronary Anatomy: There is no evidence of anomalous coronary arteries.   There is left coronary artery dominance    < end of copied text >       This is a 71 y/o Maori speaking female With history of rheumatoid arthritis, CAD, HLD, asthma, GERD, anxiety, presents to the ED complaining of epigastric pain for 3 week as well as chronic intermittent chest pressure.  Has been having intermittent chest pressure worse with exertion since July. Outpatient cardiologist had planned to get repeat CCTA but was not done for unknown reason. Seen & evaluated by cardiologist & now recommends for C  < from: CT Angio Heart and Coronaries w/ IV Cont (04.30.25 @ 10:04) >    cards : Dr Shon gastelum    COMPARISON: CT Angio Heart Coronary with IV contrast 9/1/2021    Stenosis are reported in accordance of JCCT 2009 guidelines:  0- Normal: absence of plaque and no luminal stenosis  1- Minimal: plaque with <25% stenosis  2- Mild: 25-49% stenosis  3- Moderate: 50-69% stenosis  4- Severe: 70-99% stenosis  5- Occluded    FINDINGS:    Calcium Score: the observed Agatston calcium score is  691  Left main (LM) coronary artery:  0  Left anterior descending (LAD) coronary artery:  588  Left circumflex (LCX) coronary artery:  94  Right coronary artery (RCA):  9    Coronary Anatomy: There is no evidence of anomalous coronary arteries.   There is left coronary artery dominance    < end of copied text >

## 2025-04-30 NOTE — DISCHARGE NOTE PROVIDER - HOSPITAL COURSE
HPI:  This is a 71 y/o Ugandan speaking female With history of rheumatoid arthritis, CAD, HLD, asthma, GERD, anxiety, presents to the ED complaining of epigastric pain for 3 week as well as chronic intermittent chest pressure.  Has been having intermittent chest pressure worse with exertion since July. Outpatient cardiologist had planned to get repeat CCTA but was not done for unknown reason. Seen & evaluated by cardiologist & now recommends for [facility]  < from: CT Angio Heart and Coronaries w/ IV Cont (04.30.25 @ 10:04) >    cards : Dr Shon gastelum    COMPARISON: CT Angio Heart Coronary with IV contrast 9/1/2021    Stenosis are reported in accordance of JCCT 2009 guidelines:  0- Normal: absence of plaque and no luminal stenosis  1- Minimal: plaque with <25% stenosis  2- Mild: 25-49% stenosis  3- Moderate: 50-69% stenosis  4- Severe: 70-99% stenosis  5- Occluded    FINDINGS:    Calcium Score: the observed Agatston calcium score is  691  Left main (LM) coronary artery:  0  Left anterior descending (LAD) coronary artery:  588  Left circumflex (LCX) coronary artery:  94  Right coronary artery (RCA):  9    Coronary Anatomy: There is no evidence of anomalous coronary arteries.   There is left coronary artery dominance    < end of copied text >       (30 Apr 2025 16:35)    Hospital Course:  70 year old woman with recurrent atypical chest pain, CTCA few years ago with non-obstructive plaque and plan by outpatient cardiologist to repeat. Currently pain is abdominal with some radiation to left chest, exam unremarkable, EKG normal and troponin 7. Observation in CDU without further symptoms, but CT coronary angiogram and direct comparison to prior study reveals definite progression of stenotic disease with heavily calcified LAD plaque, definitive severity of lesion unclear, at least borderline significant.   Cardiac Cath nondiagnostic.         Important Medication Changes and Reason:      Active or Pending Issues Requiring Follow-up:  Followup with your cardiologist in 1-2 weeks    Advanced Directives:   [ x] Full code  [ ] DNR  [ ] Hospice    Discharge Diagnoses:  Chest pain  Need for prophylactic measure

## 2025-04-30 NOTE — ASU PATIENT PROFILE, ADULT - FALL HARM RISK - HARM RISK INTERVENTIONS

## 2025-04-30 NOTE — DISCHARGE NOTE PROVIDER - NSDCFUSCHEDAPPT_GEN_ALL_CORE_FT
Marianna Pacheco  [facility]  [address]  Scheduled Appointment: 05/16/2025

## 2025-04-30 NOTE — DISCHARGE NOTE NURSING/CASE MANAGEMENT/SOCIAL WORK - FINANCIAL ASSISTANCE
[facility] provides services at a reduced cost to those who are determined to be eligible through [facility]’s financial assistance program. Information regarding [facility]’s financial assistance program can be found by going to [email or web address] or by calling [phone].

## 2025-04-30 NOTE — ED CDU PROVIDER SUBSEQUENT DAY NOTE - CLINICAL SUMMARY MEDICAL DECISION MAKING FREE TEXT BOX
Attending Jayne: 71 y/o F w/ PMH of GERD, CAD, asthma, RA, anxiety, HLD originally presented to the ED w/ epigastric pain x3 weeks. In main ED labs/imaging w/ no emergent findings. Placed in CDU for further cardiac testing, tele monitoring, and cards eval. pt feeling well this morning, awaiting CT coronary and cards eval for final dispo decision. Will reassess the need for additional interventions as clinically warranted. Refer to any progress notes for updates on clinical course and as a continuation of this MDM.

## 2025-04-30 NOTE — ED CDU PROVIDER SUBSEQUENT DAY NOTE - PHYSICAL EXAMINATION
GEN: Pt in NAD  PSYCH: Affect appropriate.  EYES: Sclera white w/o injection.   ENT: Head NCAT. Neck supple FROM.  RESP: CTA b/l, no wheezes, rales, or rhonchi.   CARDIAC: RRR, clear distinct S1, S2, no appreciable murmurs.  ABD: Abdomen soft, non tender, no rebound or guarding. No CVAT b/l.  VASC: No edema or calf tenderness.  SKIN: No notable rash.

## 2025-04-30 NOTE — PROGRESS NOTE ADULT - ATTENDING COMMENTS
70 year old woman with recurrent atypical chest pain, CTCA few years ago with non-obstructive plaque and plan by outpatient cardiologist to repeat. Currently pain is abdominal with some radiation to left chest, exam unremarkable, EKG normal and troponin 7. Observation in CDU without further symptoms, but CT coronary angiogram and direct comparison to prior study reveals definite progression of stenotic disease with heavily calcified LAD plaque, definitive severity of lesion unclear, at least borderline significant. Thus, admit to acute care for invasive coronary angiography.    To contact call Cardiology Fellow or Attending as listed on [email or web address] password: [email or web address].

## 2025-04-30 NOTE — DISCHARGE NOTE PROVIDER - NSDCMRMEDTOKEN_GEN_ALL_CORE_FT
Aspirin Enteric Coated 81 mg oral delayed release tablet: 1 tab(s) orally once a day  atorvastatin 40 mg oral tablet: 1 orally once a day (at bedtime)  carvedilol 3.125 mg oral tablet: 1 tab(s) orally 2 times a day  fluticasone 50 mcg/inh nasal spray: 1 spray(s) in each nostril 2 times a day  pantoprazole 40 mg oral delayed release tablet: 1 tab(s) orally once a day take 30 minutes before breakfast on an empty stomach  Vitamin B12 Tablet: 1 tablet orally once a day

## 2025-04-30 NOTE — ED CDU PROVIDER SUBSEQUENT DAY NOTE - ATTENDING APP SHARED VISIT CONTRIBUTION OF CARE
Attending Jayne: I performed a face to face evaluation of the patient and obtained a history as well as performed a physical exam. I have discussed their management with the OLLIE. I have reviewed the OLLIE note and agree with the documented findings and plan of care, except as noted. This was a shared visit with an OLLIE. I reviewed and verified the documentation and independently performed my own history/exam/medical decision making. My medical decision making and observations are found above. Please refer to any progress notes for updates on clinical course. My notes supersedes the above OLLIE note in case of discrepancy

## 2025-04-30 NOTE — PROGRESS NOTE ADULT - SUBJECTIVE AND OBJECTIVE BOX
Cardiology Progress Note  ------------------------------------------------------------------------------------------  SUBJECTIVE:   - baseline intermittent chest pressure    ROS  CONSTITUTIONAL: No weakness, fevers or chills  EYES/ENT: No visual changes;  No dysphagia  NECK: No pain or stiffness  RESPIRATORY: No cough, wheezing, hemoptysis; No shortness of breath  CARDIOVASCULAR: pos mild intermittent chest pressure  GASTROINTESTINAL: No abdominal or epigastric pain. No nausea, vomiting, or hematemesis; No diarrhea or constipation. No melena or hematochezia.  BACK: No back pain  GENITOURINARY: No dysuria, frequency or hematuria  NEUROLOGICAL: No numbness or weakness  SKIN: No itching, burning, rashes, or lesions   All other review of systems is negative unless indicated above.  -------------------------------------------------------------------------------------------  VS:  T(F): 97.8 (04-30), Max: 98 (04-29)  HR: 50 (04-30) (50 - 71)  BP: 102/63 (04-30) (102/63 - 162/86)  RR: 17 (04-30)  SpO2: 97% (04-30)  I&O's Summary    PHYSICAL EXAM:  GENERAL: NAD  HEAD: Atraumatic, Normocephalic.  ENT: Moist mucous membranes.  NECK: Supple, No JVD.  CHEST/LUNG: Clear to auscultation bilaterally; No rales, rhonchi, wheezing, or rubs. Unlabored respirations.  HEART: Regular rate and rhythm; No murmurs, rubs, or gallops.  ABDOMEN: Bowel sounds present; Soft, Nontender, Nondistended.   EXTREMITIES: No edema. 2+ Peripheral Pulses, brisk capillary refill. No clubbing or cyanosis.   NEURO: AAOx3  SKIN: no rashes or lesions    -------------------------------------------------------------------------------------------  LABS:                          13.5   6.07  )-----------( 206      ( 29 Apr 2025 12:34 )             39.8     04-29    139  |  103  |  18  ----------------------------<  91  3.8   |  26  |  0.76    Ca    9.4      29 Apr 2025 12:34    TPro  7.0  /  Alb  3.9  /  TBili  0.3  /  DBili  x   /  AST  17  /  ALT  11  /  AlkPhos  52  04-29      CARDIAC MARKERS ( 29 Apr 2025 16:56 )  7 ng/L / x     / x     / x     / x     / x      CARDIAC MARKERS ( 29 Apr 2025 12:34 )  7 ng/L / x     / x     / x     / x     / x                -------------------------------------------------------------------------------------------  Meds:  atorvastatin 40 milliGRAM(s) Oral at bedtime  carvedilol 3.125 milliGRAM(s) Oral daily    -------------------------------------------------------------------------------------------  Cardiovascular Diagnostic Testing:    ECG:     Echo:     Stress Testing:    Cath:    -------------------------------------------------------------------------------------------

## 2025-04-30 NOTE — ED CDU PROVIDER SUBSEQUENT DAY NOTE - PROGRESS NOTE DETAILS
FERNANDO Luevano: spoke to Dr Lawrence Chung regarding CT coronary results who will review and call back with final recs FERNANDO Luevano: spoke to cards fellow who recommended admit for cath Attending Jayne: pt seen and examined on AM rounds. reports feeling well. will f/u CT coronary results     FERNANDO Luevano: spoke to Dr Lawrence Chung regarding CT coronary results who will review and call back with final recs

## 2025-04-30 NOTE — ED CDU PROVIDER SUBSEQUENT DAY NOTE - HISTORY
CDU PROGRESS NOTE PA MELVA: Pt resting comfortably, feeling well without complaint. NAD, VSS. Sinus alessandra No events on telemetry. Cards recs appreciated, pending CTC in am.

## 2025-05-12 ENCOUNTER — APPOINTMENT (OUTPATIENT)
Dept: GASTROENTEROLOGY | Facility: CLINIC | Age: 70
End: 2025-05-12
Payer: MEDICARE

## 2025-05-12 DIAGNOSIS — K29.50 UNSPECIFIED CHRONIC GASTRITIS W/OUT BLEEDING: ICD-10-CM

## 2025-05-12 PROCEDURE — 99215 OFFICE O/P EST HI 40 MIN: CPT

## 2025-05-12 RX ORDER — SIMETHICONE 125 MG/1
125 CAPSULE, LIQUID FILLED ORAL
Qty: 30 | Refills: 2 | Status: ACTIVE | COMMUNITY
Start: 2025-05-12 | End: 1900-01-01

## 2025-05-13 ENCOUNTER — OUTPATIENT (OUTPATIENT)
Dept: OUTPATIENT SERVICES | Facility: HOSPITAL | Age: 70
LOS: 1 days | End: 2025-05-13
Payer: MEDICARE

## 2025-05-13 ENCOUNTER — RESULT REVIEW (OUTPATIENT)
Age: 70
End: 2025-05-13

## 2025-05-13 ENCOUNTER — APPOINTMENT (OUTPATIENT)
Dept: ULTRASOUND IMAGING | Facility: IMAGING CENTER | Age: 70
End: 2025-05-13
Payer: MEDICARE

## 2025-05-13 DIAGNOSIS — Z98.890 OTHER SPECIFIED POSTPROCEDURAL STATES: Chronic | ICD-10-CM

## 2025-05-13 DIAGNOSIS — Z98.1 ARTHRODESIS STATUS: Chronic | ICD-10-CM

## 2025-05-13 DIAGNOSIS — M25.861 OTHER SPECIFIED JOINT DISORDERS, RIGHT KNEE: ICD-10-CM

## 2025-05-13 PROCEDURE — 88173 CYTOPATH EVAL FNA REPORT: CPT | Mod: 26

## 2025-05-13 PROCEDURE — 88172 CYTP DX EVAL FNA 1ST EA SITE: CPT

## 2025-05-13 PROCEDURE — 20206 BIOPSY MUSCLE PERQ NEEDLE: CPT

## 2025-05-13 PROCEDURE — 88305 TISSUE EXAM BY PATHOLOGIST: CPT

## 2025-05-13 PROCEDURE — 76942 ECHO GUIDE FOR BIOPSY: CPT | Mod: 26

## 2025-05-13 PROCEDURE — 76942 ECHO GUIDE FOR BIOPSY: CPT

## 2025-05-13 PROCEDURE — 88307 TISSUE EXAM BY PATHOLOGIST: CPT

## 2025-05-13 PROCEDURE — 88173 CYTOPATH EVAL FNA REPORT: CPT

## 2025-05-13 PROCEDURE — 88307 TISSUE EXAM BY PATHOLOGIST: CPT | Mod: 26

## 2025-05-13 PROCEDURE — 88305 TISSUE EXAM BY PATHOLOGIST: CPT | Mod: 26

## 2025-05-15 ENCOUNTER — APPOINTMENT (OUTPATIENT)
Dept: ORTHOPEDIC SURGERY | Facility: CLINIC | Age: 70
End: 2025-05-15

## 2025-05-16 ENCOUNTER — APPOINTMENT (OUTPATIENT)
Dept: RHEUMATOLOGY | Facility: CLINIC | Age: 70
End: 2025-05-16
Payer: MEDICARE

## 2025-05-16 VITALS
SYSTOLIC BLOOD PRESSURE: 115 MMHG | HEIGHT: 60 IN | HEART RATE: 59 BPM | OXYGEN SATURATION: 97 % | RESPIRATION RATE: 16 BRPM | BODY MASS INDEX: 25.91 KG/M2 | DIASTOLIC BLOOD PRESSURE: 73 MMHG | WEIGHT: 132 LBS

## 2025-05-16 LAB — NON-GYNECOLOGICAL CYTOLOGY STUDY: SIGNIFICANT CHANGE UP

## 2025-05-16 PROCEDURE — 99215 OFFICE O/P EST HI 40 MIN: CPT

## 2025-05-16 PROCEDURE — G2211 COMPLEX E/M VISIT ADD ON: CPT

## 2025-05-16 RX ORDER — CETIRIZINE HYDROCHLORIDE 10 MG/1
10 CAPSULE, LIQUID FILLED ORAL
Refills: 0 | Status: ACTIVE | OUTPATIENT
Start: 2025-05-16

## 2025-05-16 RX ORDER — METHYLPREDNISOLONE SODIUM SUCCINATE 125 MG/2ML
125 INJECTION, POWDER, FOR SOLUTION INTRAMUSCULAR; INTRAVENOUS
Refills: 0 | Status: ACTIVE | OUTPATIENT
Start: 2025-05-16

## 2025-05-16 RX ADMIN — METHYLPREDNISOLONE SODIUM SUCCINATE 0 MG: 125 INJECTION, POWDER, FOR SOLUTION INTRAMUSCULAR; INTRAVENOUS at 00:00

## 2025-05-16 RX ADMIN — Medication 0 MG: at 00:00

## 2025-05-16 RX ADMIN — CETIRIZINE HYDROCHLORIDE 0 MG: 10 CAPSULE, LIQUID FILLED ORAL at 00:00

## 2025-05-16 RX ADMIN — RITUXIMAB 0 MG/50ML: 10 INJECTION, SOLUTION INTRAVENOUS at 00:00

## 2025-05-19 DIAGNOSIS — M06.9 RHEUMATOID ARTHRITIS, UNSPECIFIED: ICD-10-CM

## 2025-05-19 RX ORDER — RITUXIMAB-ABBS 10 MG/ML
500 INJECTION, SOLUTION INTRAVENOUS
Refills: 0 | Status: ACTIVE | OUTPATIENT
Start: 2025-05-19

## 2025-05-19 RX ORDER — RITUXIMAB 10 MG/ML
500 INJECTION, SOLUTION INTRAVENOUS
Refills: 0 | Status: DISCONTINUED | OUTPATIENT
Start: 2025-05-16 | End: 2025-05-19

## 2025-05-20 LAB
25(OH)D3 SERPL-MCNC: 65.5 NG/ML
ALBUMIN SERPL ELPH-MCNC: 4.3 G/DL
ALP BLD-CCNC: 59 U/L
ALT SERPL-CCNC: 14 U/L
ANION GAP SERPL CALC-SCNC: 14 MMOL/L
AST SERPL-CCNC: 21 U/L
BASOPHILS # BLD AUTO: 0.04 K/UL
BASOPHILS NFR BLD AUTO: 0.8 %
BILIRUB SERPL-MCNC: 0.5 MG/DL
BUN SERPL-MCNC: 13 MG/DL
CALCIUM SERPL-MCNC: 9.5 MG/DL
CD16+CD56+ CELLS # BLD: 261 CELLS/UL
CD16+CD56+ CELLS NFR BLD: 19 %
CD19 CELLS NFR BLD: 4 CELLS/UL
CD3 CELLS # BLD: 1076 CELLS/UL
CD3 CELLS NFR BLD: 79 %
CD3+CD4+ CELLS # BLD: 732 CELLS/UL
CD3+CD4+ CELLS NFR BLD: 53 %
CD3+CD4+ CELLS/CD3+CD8+ CLL SPEC: 2.04 RATIO
CD3+CD8+ CELLS # SPEC: 359 CELLS/UL
CD3+CD8+ CELLS NFR BLD: 26 %
CELLS.CD3-CD19+/CELLS IN BLOOD: <1 %
CHLORIDE SERPL-SCNC: 104 MMOL/L
CHOLEST SERPL-MCNC: 162 MG/DL
CO2 SERPL-SCNC: 24 MMOL/L
CREAT SERPL-MCNC: 0.75 MG/DL
CRP SERPL-MCNC: <3 MG/L
EGFRCR SERPLBLD CKD-EPI 2021: 86 ML/MIN/1.73M2
EOSINOPHIL # BLD AUTO: 0.5 K/UL
EOSINOPHIL NFR BLD AUTO: 10 %
ERYTHROCYTE [SEDIMENTATION RATE] IN BLOOD BY WESTERGREN METHOD: 20 MM/HR
GLUCOSE SERPL-MCNC: 94 MG/DL
HCT VFR BLD CALC: 40.1 %
HDLC SERPL-MCNC: 74 MG/DL
HGB BLD-MCNC: 13.1 G/DL
IGA SERPL-MCNC: 408 MG/DL
IGG SERPL-MCNC: 1366 MG/DL
IGM SERPL-MCNC: 35 MG/DL
IMM GRANULOCYTES NFR BLD AUTO: 0.2 %
LDLC SERPL-MCNC: 77 MG/DL
LYMPHOCYTES # BLD AUTO: 1.38 K/UL
LYMPHOCYTES NFR BLD AUTO: 27.5 %
MAN DIFF?: NORMAL
MCHC RBC-ENTMCNC: 30 PG
MCHC RBC-ENTMCNC: 32.7 G/DL
MCV RBC AUTO: 92 FL
MONOCYTES # BLD AUTO: 0.44 K/UL
MONOCYTES NFR BLD AUTO: 8.8 %
NEUTROPHILS # BLD AUTO: 2.65 K/UL
NEUTROPHILS NFR BLD AUTO: 52.7 %
NONHDLC SERPL-MCNC: 88 MG/DL
PLATELET # BLD AUTO: 221 K/UL
POTASSIUM SERPL-SCNC: 4.4 MMOL/L
PROT SERPL-MCNC: 7.1 G/DL
RBC # BLD: 4.36 M/UL
RBC # FLD: 13.1 %
SODIUM SERPL-SCNC: 142 MMOL/L
TRIGL SERPL-MCNC: 56 MG/DL
TSH SERPL-ACNC: 1.9 UIU/ML
VIABILITY: NORMAL
WBC # FLD AUTO: 5.02 K/UL

## 2025-05-28 ENCOUNTER — APPOINTMENT (OUTPATIENT)
Dept: ORTHOPEDIC SURGERY | Facility: CLINIC | Age: 70
End: 2025-05-28
Payer: MEDICARE

## 2025-05-28 VITALS
SYSTOLIC BLOOD PRESSURE: 113 MMHG | DIASTOLIC BLOOD PRESSURE: 71 MMHG | WEIGHT: 135.3 LBS | BODY MASS INDEX: 26.56 KG/M2 | HEIGHT: 60 IN | HEART RATE: 65 BPM

## 2025-05-28 DIAGNOSIS — M25.861 OTHER SPECIFIED JOINT DISORDERS, RIGHT KNEE: ICD-10-CM

## 2025-05-28 DIAGNOSIS — S83.281A OTHER TEAR OF LATERAL MENISCUS, CURRENT INJURY, RIGHT KNEE, INITIAL ENCOUNTER: ICD-10-CM

## 2025-05-28 PROCEDURE — 99213 OFFICE O/P EST LOW 20 MIN: CPT

## 2025-06-16 ENCOUNTER — APPOINTMENT (OUTPATIENT)
Dept: RHEUMATOLOGY | Facility: CLINIC | Age: 70
End: 2025-06-16
Payer: MEDICARE

## 2025-06-16 VITALS
DIASTOLIC BLOOD PRESSURE: 76 MMHG | TEMPERATURE: 98 F | OXYGEN SATURATION: 97 % | HEART RATE: 59 BPM | RESPIRATION RATE: 16 BRPM | SYSTOLIC BLOOD PRESSURE: 113 MMHG

## 2025-06-16 VITALS
DIASTOLIC BLOOD PRESSURE: 70 MMHG | SYSTOLIC BLOOD PRESSURE: 109 MMHG | OXYGEN SATURATION: 96 % | HEART RATE: 53 BPM | RESPIRATION RATE: 16 BRPM

## 2025-06-16 VITALS
SYSTOLIC BLOOD PRESSURE: 101 MMHG | HEART RATE: 52 BPM | DIASTOLIC BLOOD PRESSURE: 65 MMHG | OXYGEN SATURATION: 96 % | RESPIRATION RATE: 16 BRPM | TEMPERATURE: 98.2 F

## 2025-06-16 PROCEDURE — 96413 CHEMO IV INFUSION 1 HR: CPT

## 2025-06-16 PROCEDURE — 96374 THER/PROPH/DIAG INJ IV PUSH: CPT | Mod: 59

## 2025-06-16 PROCEDURE — 96415 CHEMO IV INFUSION ADDL HR: CPT

## 2025-06-16 RX ORDER — METHYLPREDNISOLONE SODIUM SUCCINATE 125 MG/2ML
125 INJECTION, POWDER, FOR SOLUTION INTRAMUSCULAR; INTRAVENOUS
Qty: 0 | Refills: 0 | Status: COMPLETED
Start: 2025-05-16

## 2025-06-16 RX ORDER — CETIRIZINE HYDROCHLORIDE 10 MG/1
10 CAPSULE, LIQUID FILLED ORAL
Qty: 0 | Refills: 0 | Status: COMPLETED
Start: 2025-05-16

## 2025-06-16 RX ORDER — RITUXIMAB-ABBS 10 MG/ML
500 INJECTION, SOLUTION INTRAVENOUS
Qty: 0 | Refills: 0 | Status: COMPLETED
Start: 2025-05-19

## 2025-06-17 LAB
ALBUMIN SERPL ELPH-MCNC: 4 G/DL
ALP BLD-CCNC: 63 U/L
ALT SERPL-CCNC: 14 U/L
ANION GAP SERPL CALC-SCNC: 15 MMOL/L
AST SERPL-CCNC: 18 U/L
BASOPHILS # BLD AUTO: 0.04 K/UL
BASOPHILS NFR BLD AUTO: 0.6 %
BILIRUB SERPL-MCNC: 0.4 MG/DL
BUN SERPL-MCNC: 17 MG/DL
CALCIUM SERPL-MCNC: 9.5 MG/DL
CHLORIDE SERPL-SCNC: 102 MMOL/L
CO2 SERPL-SCNC: 24 MMOL/L
CREAT SERPL-MCNC: 0.82 MG/DL
CRP SERPL-MCNC: <3 MG/L
EGFRCR SERPLBLD CKD-EPI 2021: 77 ML/MIN/1.73M2
EOSINOPHIL # BLD AUTO: 0.38 K/UL
EOSINOPHIL NFR BLD AUTO: 5.7 %
HCT VFR BLD CALC: 40.7 %
HGB BLD-MCNC: 13.1 G/DL
IMM GRANULOCYTES NFR BLD AUTO: 0.3 %
LYMPHOCYTES # BLD AUTO: 1.55 K/UL
LYMPHOCYTES NFR BLD AUTO: 23.2 %
MAN DIFF?: NORMAL
MCHC RBC-ENTMCNC: 29.5 PG
MCHC RBC-ENTMCNC: 32.2 G/DL
MCV RBC AUTO: 91.7 FL
MONOCYTES # BLD AUTO: 0.55 K/UL
MONOCYTES NFR BLD AUTO: 8.2 %
NEUTROPHILS # BLD AUTO: 4.14 K/UL
NEUTROPHILS NFR BLD AUTO: 62 %
PLATELET # BLD AUTO: 252 K/UL
POTASSIUM SERPL-SCNC: 4.2 MMOL/L
PROT SERPL-MCNC: 6.9 G/DL
RBC # BLD: 4.44 M/UL
RBC # FLD: 13 %
SODIUM SERPL-SCNC: 140 MMOL/L
WBC # FLD AUTO: 6.68 K/UL

## 2025-06-27 ENCOUNTER — APPOINTMENT (OUTPATIENT)
Dept: CARDIOLOGY | Facility: CLINIC | Age: 70
End: 2025-06-27

## 2025-06-27 ENCOUNTER — NON-APPOINTMENT (OUTPATIENT)
Age: 70
End: 2025-06-27

## 2025-06-27 VITALS
BODY MASS INDEX: 26.95 KG/M2 | OXYGEN SATURATION: 99 % | DIASTOLIC BLOOD PRESSURE: 72 MMHG | HEART RATE: 58 BPM | WEIGHT: 138 LBS | SYSTOLIC BLOOD PRESSURE: 113 MMHG

## 2025-06-27 VITALS — BODY MASS INDEX: 25.78 KG/M2 | WEIGHT: 132 LBS

## 2025-06-27 PROBLEM — R25.2 LEG CRAMPS: Status: ACTIVE | Noted: 2025-06-27

## 2025-06-27 PROCEDURE — G2211 COMPLEX E/M VISIT ADD ON: CPT

## 2025-06-27 PROCEDURE — 99214 OFFICE O/P EST MOD 30 MIN: CPT

## 2025-06-30 ENCOUNTER — APPOINTMENT (OUTPATIENT)
Dept: OTOLARYNGOLOGY | Facility: CLINIC | Age: 70
End: 2025-06-30
Payer: MEDICARE

## 2025-06-30 PROBLEM — R43.9 DYSOSMIA: Status: ACTIVE | Noted: 2025-06-30

## 2025-06-30 PROBLEM — H61.23 EXCESSIVE CERUMEN IN BOTH EAR CANALS: Status: ACTIVE | Noted: 2025-06-30

## 2025-06-30 PROCEDURE — 31231 NASAL ENDOSCOPY DX: CPT

## 2025-06-30 PROCEDURE — 99214 OFFICE O/P EST MOD 30 MIN: CPT | Mod: 25

## 2025-06-30 RX ORDER — AMOXICILLIN AND CLAVULANATE POTASSIUM 875; 125 MG/1; MG/1
875-125 TABLET, COATED ORAL
Qty: 14 | Refills: 1 | Status: ACTIVE | COMMUNITY
Start: 2025-06-30 | End: 1900-01-01

## 2025-06-30 RX ORDER — AZELASTINE HYDROCHLORIDE 137 UG/1
0.1 SPRAY, METERED NASAL TWICE DAILY
Qty: 3 | Refills: 3 | Status: ACTIVE | COMMUNITY
Start: 2025-06-30 | End: 1900-01-01

## 2025-06-30 RX ORDER — FLUTICASONE PROPIONATE 50 UG/1
50 SPRAY NASAL
Qty: 3 | Refills: 3 | Status: ACTIVE | COMMUNITY
Start: 2025-06-30 | End: 1900-01-01

## 2025-06-30 RX ORDER — METHYLPREDNISOLONE 4 MG/1
4 TABLET ORAL
Qty: 1 | Refills: 0 | Status: ACTIVE | COMMUNITY
Start: 2025-06-30 | End: 1900-01-01

## 2025-07-01 ENCOUNTER — APPOINTMENT (OUTPATIENT)
Dept: GASTROENTEROLOGY | Facility: CLINIC | Age: 70
End: 2025-07-01

## 2025-07-02 ENCOUNTER — APPOINTMENT (OUTPATIENT)
Dept: RHEUMATOLOGY | Facility: CLINIC | Age: 70
End: 2025-07-02
Payer: MEDICARE

## 2025-07-02 VITALS
HEART RATE: 58 BPM | DIASTOLIC BLOOD PRESSURE: 60 MMHG | RESPIRATION RATE: 16 BRPM | TEMPERATURE: 98.1 F | OXYGEN SATURATION: 96 % | SYSTOLIC BLOOD PRESSURE: 94 MMHG

## 2025-07-02 VITALS
OXYGEN SATURATION: 96 % | DIASTOLIC BLOOD PRESSURE: 61 MMHG | HEART RATE: 55 BPM | SYSTOLIC BLOOD PRESSURE: 97 MMHG | RESPIRATION RATE: 16 BRPM | TEMPERATURE: 98.1 F

## 2025-07-02 VITALS
SYSTOLIC BLOOD PRESSURE: 88 MMHG | HEART RATE: 57 BPM | DIASTOLIC BLOOD PRESSURE: 53 MMHG | TEMPERATURE: 98 F | OXYGEN SATURATION: 97 % | RESPIRATION RATE: 16 BRPM

## 2025-07-02 PROCEDURE — 96413 CHEMO IV INFUSION 1 HR: CPT

## 2025-07-02 PROCEDURE — 96415 CHEMO IV INFUSION ADDL HR: CPT

## 2025-07-02 PROCEDURE — 96374 THER/PROPH/DIAG INJ IV PUSH: CPT | Mod: 59

## 2025-07-02 RX ORDER — CETIRIZINE HYDROCHLORIDE 10 MG/1
10 CAPSULE, LIQUID FILLED ORAL
Qty: 0 | Refills: 0 | Status: COMPLETED
Start: 2025-05-16

## 2025-07-02 RX ORDER — RITUXIMAB-ABBS 10 MG/ML
500 INJECTION, SOLUTION INTRAVENOUS
Qty: 0 | Refills: 0 | Status: COMPLETED
Start: 2025-05-19

## 2025-07-02 RX ORDER — METHYLPREDNISOLONE SODIUM SUCCINATE 125 MG/2ML
125 INJECTION, POWDER, FOR SOLUTION INTRAMUSCULAR; INTRAVENOUS
Qty: 0 | Refills: 0 | Status: COMPLETED
Start: 2025-05-16

## 2025-07-03 LAB
ALBUMIN SERPL ELPH-MCNC: 4.1 G/DL
ALP BLD-CCNC: 61 U/L
ALT SERPL-CCNC: 16 U/L
ANION GAP SERPL CALC-SCNC: 15 MMOL/L
AST SERPL-CCNC: 20 U/L
BASOPHILS # BLD AUTO: 0.06 K/UL
BASOPHILS NFR BLD AUTO: 1.1 %
BILIRUB SERPL-MCNC: 0.4 MG/DL
BUN SERPL-MCNC: 15 MG/DL
CALCIUM SERPL-MCNC: 9.2 MG/DL
CHLORIDE SERPL-SCNC: 103 MMOL/L
CO2 SERPL-SCNC: 25 MMOL/L
CREAT SERPL-MCNC: 0.76 MG/DL
CRP SERPL-MCNC: <3 MG/L
EGFRCR SERPLBLD CKD-EPI 2021: 84 ML/MIN/1.73M2
EOSINOPHIL # BLD AUTO: 0.33 K/UL
EOSINOPHIL NFR BLD AUTO: 5.8 %
HCT VFR BLD CALC: 40.2 %
HGB BLD-MCNC: 13.2 G/DL
IMM GRANULOCYTES NFR BLD AUTO: 0.2 %
LYMPHOCYTES # BLD AUTO: 1.5 K/UL
LYMPHOCYTES NFR BLD AUTO: 26.5 %
MAN DIFF?: NORMAL
MCHC RBC-ENTMCNC: 30.3 PG
MCHC RBC-ENTMCNC: 32.8 G/DL
MCV RBC AUTO: 92.4 FL
MONOCYTES # BLD AUTO: 0.39 K/UL
MONOCYTES NFR BLD AUTO: 6.9 %
NEUTROPHILS # BLD AUTO: 3.36 K/UL
NEUTROPHILS NFR BLD AUTO: 59.5 %
PLATELET # BLD AUTO: 211 K/UL
POTASSIUM SERPL-SCNC: 3.9 MMOL/L
PROT SERPL-MCNC: 7 G/DL
RBC # BLD: 4.35 M/UL
RBC # FLD: 13 %
SODIUM SERPL-SCNC: 142 MMOL/L
WBC # FLD AUTO: 5.65 K/UL

## 2025-07-07 ENCOUNTER — RX RENEWAL (OUTPATIENT)
Age: 70
End: 2025-07-07

## 2025-07-14 ENCOUNTER — APPOINTMENT (OUTPATIENT)
Dept: OTOLARYNGOLOGY | Facility: CLINIC | Age: 70
End: 2025-07-14
Payer: MEDICARE

## 2025-07-14 VITALS
BODY MASS INDEX: 25.52 KG/M2 | HEART RATE: 61 BPM | DIASTOLIC BLOOD PRESSURE: 64 MMHG | SYSTOLIC BLOOD PRESSURE: 105 MMHG | WEIGHT: 130 LBS | HEIGHT: 60 IN

## 2025-07-14 PROCEDURE — 31231 NASAL ENDOSCOPY DX: CPT

## 2025-07-14 PROCEDURE — 99213 OFFICE O/P EST LOW 20 MIN: CPT | Mod: 25

## 2025-07-18 ENCOUNTER — APPOINTMENT (OUTPATIENT)
Dept: OPHTHALMOLOGY | Facility: CLINIC | Age: 70
End: 2025-07-18
Payer: MEDICARE

## 2025-07-18 ENCOUNTER — NON-APPOINTMENT (OUTPATIENT)
Age: 70
End: 2025-07-18

## 2025-07-18 PROCEDURE — 92014 COMPRE OPH EXAM EST PT 1/>: CPT | Mod: 25

## 2025-07-18 PROCEDURE — 92025 CPTRIZED CORNEAL TOPOGRAPHY: CPT

## 2025-07-31 ENCOUNTER — OUTPATIENT (OUTPATIENT)
Dept: OUTPATIENT SERVICES | Facility: HOSPITAL | Age: 70
LOS: 1 days | End: 2025-07-31
Payer: MEDICARE

## 2025-07-31 ENCOUNTER — APPOINTMENT (OUTPATIENT)
Dept: INTERNAL MEDICINE | Facility: CLINIC | Age: 70
End: 2025-07-31
Payer: MEDICARE

## 2025-07-31 VITALS
HEIGHT: 60 IN | OXYGEN SATURATION: 97 % | BODY MASS INDEX: 26.7 KG/M2 | DIASTOLIC BLOOD PRESSURE: 70 MMHG | SYSTOLIC BLOOD PRESSURE: 122 MMHG | WEIGHT: 136 LBS | HEART RATE: 60 BPM

## 2025-07-31 DIAGNOSIS — Z98.890 OTHER SPECIFIED POSTPROCEDURAL STATES: Chronic | ICD-10-CM

## 2025-07-31 DIAGNOSIS — R10.31 RIGHT LOWER QUADRANT PAIN: ICD-10-CM

## 2025-07-31 DIAGNOSIS — R73.01 IMPAIRED FASTING GLUCOSE: ICD-10-CM

## 2025-07-31 DIAGNOSIS — R42 DIZZINESS AND GIDDINESS: ICD-10-CM

## 2025-07-31 DIAGNOSIS — I25.10 ATHEROSCLEROTIC HEART DISEASE OF NATIVE CORONARY ARTERY W/OUT ANGINA PECTORIS: ICD-10-CM

## 2025-07-31 DIAGNOSIS — I10 ESSENTIAL (PRIMARY) HYPERTENSION: ICD-10-CM

## 2025-07-31 LAB
APO B SERPL-MCNC: 80 MG/DL
ESTIMATED AVERAGE GLUCOSE: 120 MG/DL
HBA1C MFR BLD HPLC: 5.8 %

## 2025-07-31 PROCEDURE — G0463: CPT

## 2025-07-31 PROCEDURE — 99214 OFFICE O/P EST MOD 30 MIN: CPT

## 2025-07-31 RX ORDER — METFORMIN HYDROCHLORIDE 500 MG/1
500 TABLET, EXTENDED RELEASE ORAL
Qty: 30 | Refills: 3 | Status: ACTIVE | COMMUNITY
Start: 2025-07-31 | End: 1900-01-01

## 2025-08-01 ENCOUNTER — APPOINTMENT (OUTPATIENT)
Dept: RHEUMATOLOGY | Facility: CLINIC | Age: 70
End: 2025-08-01
Payer: MEDICARE

## 2025-08-01 VITALS
SYSTOLIC BLOOD PRESSURE: 125 MMHG | WEIGHT: 132 LBS | HEART RATE: 63 BPM | BODY MASS INDEX: 25.91 KG/M2 | DIASTOLIC BLOOD PRESSURE: 80 MMHG | OXYGEN SATURATION: 97 % | HEIGHT: 60 IN

## 2025-08-01 DIAGNOSIS — M81.0 AGE-RELATED OSTEOPOROSIS W/OUT CURRENT PATHOLOGICAL FRACTURE: ICD-10-CM

## 2025-08-01 DIAGNOSIS — M06.9 RHEUMATOID ARTHRITIS, UNSPECIFIED: ICD-10-CM

## 2025-08-01 LAB
APO LP(A) SERPL-MCNC: 59.1 NMOL/L
LDLC SERPL DIRECT ASSAY-MCNC: 111 MG/DL

## 2025-08-01 PROCEDURE — G2211 COMPLEX E/M VISIT ADD ON: CPT

## 2025-08-01 PROCEDURE — 99215 OFFICE O/P EST HI 40 MIN: CPT

## 2025-08-01 RX ADMIN — ZOLEDRONIC ACID 0 MG/100ML: 5 INJECTION, SOLUTION INTRAVENOUS at 00:00

## 2025-08-05 DIAGNOSIS — I25.10 ATHEROSCLEROTIC HEART DISEASE OF NATIVE CORONARY ARTERY WITHOUT ANGINA PECTORIS: ICD-10-CM

## 2025-08-05 DIAGNOSIS — R42 DIZZINESS AND GIDDINESS: ICD-10-CM

## 2025-08-05 DIAGNOSIS — R10.31 RIGHT LOWER QUADRANT PAIN: ICD-10-CM

## 2025-08-05 DIAGNOSIS — R73.01 IMPAIRED FASTING GLUCOSE: ICD-10-CM

## 2025-08-13 LAB
25(OH)D3 SERPL-MCNC: 48.3 NG/ML
ALBUMIN SERPL ELPH-MCNC: 4.3 G/DL
ALP BLD-CCNC: 61 U/L
ALT SERPL-CCNC: 19 U/L
ANION GAP SERPL CALC-SCNC: 14 MMOL/L
AST SERPL-CCNC: 19 U/L
BASOPHILS # BLD AUTO: 0.05 K/UL
BASOPHILS NFR BLD AUTO: 0.7 %
BILIRUB SERPL-MCNC: 0.3 MG/DL
BUN SERPL-MCNC: 20 MG/DL
CALCIUM SERPL-MCNC: 9.8 MG/DL
CD16+CD56+ CELLS # BLD: 193 CELLS/UL
CD16+CD56+ CELLS NFR BLD: 16 %
CD19 CELLS NFR BLD: 1 CELLS/UL
CD3 CELLS # BLD: 1069 CELLS/UL
CD3 CELLS NFR BLD: 84 %
CD3+CD4+ CELLS # BLD: 806 CELLS/UL
CD3+CD4+ CELLS NFR BLD: 60 %
CD3+CD4+ CELLS/CD3+CD8+ CLL SPEC: 2.53 RATIO
CD3+CD8+ CELLS # SPEC: 319 CELLS/UL
CD3+CD8+ CELLS NFR BLD: 24 %
CELLS.CD3-CD19+/CELLS IN BLOOD: <1 %
CHLORIDE SERPL-SCNC: 104 MMOL/L
CO2 SERPL-SCNC: 27 MMOL/L
CREAT SERPL-MCNC: 0.84 MG/DL
CRP SERPL-MCNC: <3 MG/L
EGFRCR SERPLBLD CKD-EPI 2021: 75 ML/MIN/1.73M2
EOSINOPHIL # BLD AUTO: 0.26 K/UL
EOSINOPHIL NFR BLD AUTO: 3.8 %
ERYTHROCYTE [SEDIMENTATION RATE] IN BLOOD BY WESTERGREN METHOD: 33 MM/HR
GLUCOSE SERPL-MCNC: 118 MG/DL
HCT VFR BLD CALC: 39.2 %
HGB BLD-MCNC: 12.8 G/DL
IGA SERPL-MCNC: 369 MG/DL
IGG SERPL-MCNC: 1252 MG/DL
IGM SERPL-MCNC: 35 MG/DL
IMM GRANULOCYTES NFR BLD AUTO: 0.3 %
LYMPHOCYTES # BLD AUTO: 1.31 K/UL
LYMPHOCYTES NFR BLD AUTO: 19.4 %
MAN DIFF?: NORMAL
MCHC RBC-ENTMCNC: 29.8 PG
MCHC RBC-ENTMCNC: 32.7 G/DL
MCV RBC AUTO: 91.4 FL
MONOCYTES # BLD AUTO: 0.45 K/UL
MONOCYTES NFR BLD AUTO: 6.6 %
NEUTROPHILS # BLD AUTO: 4.68 K/UL
NEUTROPHILS NFR BLD AUTO: 69.2 %
PLATELET # BLD AUTO: 228 K/UL
POTASSIUM SERPL-SCNC: 4.3 MMOL/L
PROT SERPL-MCNC: 7 G/DL
RBC # BLD: 4.29 M/UL
RBC # FLD: 13.2 %
SODIUM SERPL-SCNC: 145 MMOL/L
VIABILITY: NORMAL
WBC # FLD AUTO: 6.77 K/UL

## 2025-08-20 ENCOUNTER — APPOINTMENT (OUTPATIENT)
Dept: CT IMAGING | Facility: CLINIC | Age: 70
End: 2025-08-20

## 2025-08-20 ENCOUNTER — OUTPATIENT (OUTPATIENT)
Dept: OUTPATIENT SERVICES | Facility: HOSPITAL | Age: 70
LOS: 1 days | End: 2025-08-20
Payer: MEDICARE

## 2025-08-20 DIAGNOSIS — Z98.890 OTHER SPECIFIED POSTPROCEDURAL STATES: Chronic | ICD-10-CM

## 2025-08-20 DIAGNOSIS — Z98.1 ARTHRODESIS STATUS: Chronic | ICD-10-CM

## 2025-08-20 DIAGNOSIS — R10.31 RIGHT LOWER QUADRANT PAIN: ICD-10-CM

## 2025-08-20 DIAGNOSIS — Z00.8 ENCOUNTER FOR OTHER GENERAL EXAMINATION: ICD-10-CM

## 2025-08-20 PROCEDURE — 72131 CT LUMBAR SPINE W/O DYE: CPT

## 2025-08-20 PROCEDURE — 72131 CT LUMBAR SPINE W/O DYE: CPT | Mod: 26

## 2025-08-27 ENCOUNTER — OUTPATIENT (OUTPATIENT)
Dept: OUTPATIENT SERVICES | Facility: HOSPITAL | Age: 70
LOS: 1 days | End: 2025-08-27
Payer: COMMERCIAL

## 2025-08-27 ENCOUNTER — APPOINTMENT (OUTPATIENT)
Dept: RHEUMATOLOGY | Facility: CLINIC | Age: 70
End: 2025-08-27
Payer: MEDICARE

## 2025-08-27 ENCOUNTER — APPOINTMENT (OUTPATIENT)
Dept: ULTRASOUND IMAGING | Facility: CLINIC | Age: 70
End: 2025-08-27
Payer: MEDICARE

## 2025-08-27 VITALS
OXYGEN SATURATION: 97 % | RESPIRATION RATE: 16 BRPM | HEART RATE: 58 BPM | TEMPERATURE: 98 F | DIASTOLIC BLOOD PRESSURE: 65 MMHG | SYSTOLIC BLOOD PRESSURE: 100 MMHG

## 2025-08-27 VITALS
DIASTOLIC BLOOD PRESSURE: 75 MMHG | OXYGEN SATURATION: 98 % | RESPIRATION RATE: 16 BRPM | HEART RATE: 58 BPM | SYSTOLIC BLOOD PRESSURE: 107 MMHG

## 2025-08-27 DIAGNOSIS — Z00.8 ENCOUNTER FOR OTHER GENERAL EXAMINATION: ICD-10-CM

## 2025-08-27 DIAGNOSIS — Z98.1 ARTHRODESIS STATUS: Chronic | ICD-10-CM

## 2025-08-27 DIAGNOSIS — R10.31 RIGHT LOWER QUADRANT PAIN: ICD-10-CM

## 2025-08-27 DIAGNOSIS — Z98.890 OTHER SPECIFIED POSTPROCEDURAL STATES: Chronic | ICD-10-CM

## 2025-08-27 PROCEDURE — 96374 THER/PROPH/DIAG INJ IV PUSH: CPT

## 2025-08-27 PROCEDURE — 76700 US EXAM ABDOM COMPLETE: CPT

## 2025-08-27 PROCEDURE — 76700 US EXAM ABDOM COMPLETE: CPT | Mod: 26

## 2025-09-08 ENCOUNTER — APPOINTMENT (OUTPATIENT)
Dept: OTOLARYNGOLOGY | Facility: CLINIC | Age: 70
End: 2025-09-08
Payer: MEDICARE

## 2025-09-08 DIAGNOSIS — J31.0 CHRONIC RHINITIS: ICD-10-CM

## 2025-09-08 DIAGNOSIS — R42 DIZZINESS AND GIDDINESS: ICD-10-CM

## 2025-09-08 DIAGNOSIS — H90.3 SENSORINEURAL HEARING LOSS, BILATERAL: ICD-10-CM

## 2025-09-08 DIAGNOSIS — J34.2 DEVIATED NASAL SEPTUM: ICD-10-CM

## 2025-09-08 PROCEDURE — 99214 OFFICE O/P EST MOD 30 MIN: CPT | Mod: 25

## 2025-09-08 RX ORDER — MECLIZINE HYDROCHLORIDE 12.5 MG/1
12.5 TABLET ORAL
Qty: 30 | Refills: 1 | Status: ACTIVE | COMMUNITY
Start: 2025-09-08 | End: 1900-01-01

## (undated) DEVICE — DRAPE C ARM MINI

## (undated) DEVICE — POSITIONER FOAM HEAD CRADLE (PINK)

## (undated) DEVICE — BUR MICROAIRE CARBIDE ROUND 4MM

## (undated) DEVICE — TOURNIQUET CUFF 18" DUAL PORT SINGLE BLADDER W PLC  (BLACK)

## (undated) DEVICE — DRAPE SURGICAL #1010

## (undated) DEVICE — Device

## (undated) DEVICE — DRSG CURITY GAUZE SPONGE 4 X 4" 12-PLY

## (undated) DEVICE — WARMING BLANKET LOWER ADULT

## (undated) DEVICE — SUT ETHIBOND 3-0 30" V-5

## (undated) DEVICE — DRSG ACE BANDAGE 4"

## (undated) DEVICE — TOURNIQUET ESMARK 4"

## (undated) DEVICE — SUT POLYSORB 2-0 18" V-20

## (undated) DEVICE — GLV 7.5 PROTEXIS (BLUE)

## (undated) DEVICE — SUT MONOSOF 4-0 18" C-13

## (undated) DEVICE — ELCTR BIPOLAR CORD 12FT

## (undated) DEVICE — NDL HYPO REGULAR BEVEL 25G X 1.5" (BLUE)

## (undated) DEVICE — DRAPE TOWEL BLUE 17" X 24"

## (undated) DEVICE — SUT SOFSILK 2-0 18" C-15

## (undated) DEVICE — SUT NYLON 5-0 18" R-5

## (undated) DEVICE — VENODYNE/SCD SLEEVE CALF MEDIUM

## (undated) DEVICE — DRSG WEBRIL 3"

## (undated) DEVICE — SUT MONOSOF 3-0 30" C-16

## (undated) DEVICE — SUT POLYSORB 4-0 30" C-13 UNDYED

## (undated) DEVICE — CANISTER SUCTION LID GUARD 3000CC

## (undated) DEVICE — SOLIDIFIER CANN EXPRESS 3K

## (undated) DEVICE — SUT BIOSYN 5-0 18" P-13

## (undated) DEVICE — SUT POLYSORB 3-0 18" V-20 UNDYED

## (undated) DEVICE — DRSG STERISTRIPS 0.5 X 4"

## (undated) DEVICE — PACK UPPER EXTREMITY

## (undated) DEVICE — SUT BIOSYN 4-0 18" P-13

## (undated) DEVICE — BUR STRYKER MICRO ROUND POLISHING 2X54MM 18 FLUTES

## (undated) DEVICE — DRSG STOCKINETTE TUBULAR COTTON 2PLY 2X2.5"

## (undated) DEVICE — POSITIONER STRAP ARMBOARD VELCRO TS-30

## (undated) DEVICE — GLV 7 PROTEXIS (WHITE)

## (undated) DEVICE — BLADE SCALPEL SAFETYLOCK #15